# Patient Record
Sex: MALE | Race: WHITE | NOT HISPANIC OR LATINO | Employment: FULL TIME | URBAN - METROPOLITAN AREA
[De-identification: names, ages, dates, MRNs, and addresses within clinical notes are randomized per-mention and may not be internally consistent; named-entity substitution may affect disease eponyms.]

---

## 2017-04-07 ENCOUNTER — GENERIC CONVERSION - ENCOUNTER (OUTPATIENT)
Dept: OTHER | Facility: OTHER | Age: 42
End: 2017-04-07

## 2017-09-05 ENCOUNTER — ALLSCRIPTS OFFICE VISIT (OUTPATIENT)
Dept: OTHER | Facility: OTHER | Age: 42
End: 2017-09-05

## 2017-09-07 ENCOUNTER — HOSPITAL ENCOUNTER (INPATIENT)
Facility: HOSPITAL | Age: 42
LOS: 2 days | Discharge: HOME | DRG: 395 | End: 2017-09-09
Attending: EMERGENCY MEDICINE | Admitting: SURGERY
Payer: COMMERCIAL

## 2017-09-07 ENCOUNTER — ANESTHESIA (INPATIENT)
Dept: PERIOP | Facility: HOSPITAL | Age: 42
DRG: 395 | End: 2017-09-07
Payer: COMMERCIAL

## 2017-09-07 ENCOUNTER — ANESTHESIA EVENT (INPATIENT)
Dept: PERIOP | Facility: HOSPITAL | Age: 42
DRG: 395 | End: 2017-09-07
Payer: COMMERCIAL

## 2017-09-07 ENCOUNTER — ALLSCRIPTS OFFICE VISIT (OUTPATIENT)
Dept: OTHER | Facility: OTHER | Age: 42
End: 2017-09-07

## 2017-09-07 DIAGNOSIS — I10 ESSENTIAL HYPERTENSION: Chronic | ICD-10-CM

## 2017-09-07 DIAGNOSIS — E11.9 TYPE 2 DIABETES MELLITUS WITHOUT COMPLICATION, WITHOUT LONG-TERM CURRENT USE OF INSULIN (HCC): Chronic | ICD-10-CM

## 2017-09-07 DIAGNOSIS — A41.9 SEPSIS (HCC): ICD-10-CM

## 2017-09-07 DIAGNOSIS — K61.1 PERIRECTAL ABSCESS: Primary | ICD-10-CM

## 2017-09-07 LAB
ABO GROUP BLD: NORMAL
ALBUMIN SERPL BCP-MCNC: 3.2 G/DL (ref 3.5–5)
ALP SERPL-CCNC: 134 U/L (ref 46–116)
ALT SERPL W P-5'-P-CCNC: 51 U/L (ref 12–78)
ANION GAP SERPL CALCULATED.3IONS-SCNC: 7 MMOL/L (ref 4–13)
APTT PPP: 31 SECONDS (ref 23–35)
AST SERPL W P-5'-P-CCNC: 25 U/L (ref 5–45)
ATRIAL RATE: 83 BPM
BASOPHILS # BLD AUTO: 0.03 THOUSANDS/ΜL (ref 0–0.1)
BASOPHILS NFR BLD AUTO: 0 % (ref 0–1)
BILIRUB SERPL-MCNC: 0.7 MG/DL (ref 0.2–1)
BLD GP AB SCN SERPL QL: NEGATIVE
BUN SERPL-MCNC: 14 MG/DL (ref 5–25)
CALCIUM SERPL-MCNC: 9 MG/DL (ref 8.3–10.1)
CHLORIDE SERPL-SCNC: 93 MMOL/L (ref 100–108)
CLARITY, POC: CLEAR
CO2 SERPL-SCNC: 33 MMOL/L (ref 21–32)
COLOR, POC: NORMAL
CREAT SERPL-MCNC: 1.18 MG/DL (ref 0.6–1.3)
EOSINOPHIL # BLD AUTO: 0.06 THOUSAND/ΜL (ref 0–0.61)
EOSINOPHIL NFR BLD AUTO: 1 % (ref 0–6)
ERYTHROCYTE [DISTWIDTH] IN BLOOD BY AUTOMATED COUNT: 12 % (ref 11.6–15.1)
EXT BILIRUBIN, UA: NEGATIVE
EXT BLOOD URINE: NEGATIVE
EXT GLUCOSE, UA: 2000
EXT KETONES: NEGATIVE
EXT NITRITE, UA: NEGATIVE
EXT PH, UA: 8.5
EXT PROTEIN, UA: NORMAL
EXT SPECIFIC GRAVITY, UA: 1
EXT UROBILINOGEN: 1
GFR SERPL CREATININE-BSD FRML MDRD: 75 ML/MIN/1.73SQ M
GLUCOSE SERPL-MCNC: 269 MG/DL (ref 65–140)
GLUCOSE SERPL-MCNC: 359 MG/DL (ref 65–140)
HCT VFR BLD AUTO: 39.4 % (ref 36.5–49.3)
HGB BLD-MCNC: 13.4 G/DL (ref 12–17)
INR PPP: 1.09 (ref 0.86–1.16)
LACTATE SERPL-SCNC: 2.6 MMOL/L (ref 0.5–2)
LYMPHOCYTES # BLD AUTO: 0.98 THOUSANDS/ΜL (ref 0.6–4.47)
LYMPHOCYTES NFR BLD AUTO: 8 % (ref 14–44)
MCH RBC QN AUTO: 28 PG (ref 26.8–34.3)
MCHC RBC AUTO-ENTMCNC: 34 G/DL (ref 31.4–37.4)
MCV RBC AUTO: 82 FL (ref 82–98)
MONOCYTES # BLD AUTO: 0.9 THOUSAND/ΜL (ref 0.17–1.22)
MONOCYTES NFR BLD AUTO: 7 % (ref 4–12)
NEUTROPHILS # BLD AUTO: 10.08 THOUSANDS/ΜL (ref 1.85–7.62)
NEUTS SEG NFR BLD AUTO: 83 % (ref 43–75)
NRBC BLD AUTO-RTO: 0 /100 WBCS
P AXIS: 62 DEGREES
PLATELET # BLD AUTO: 273 THOUSANDS/UL (ref 149–390)
PMV BLD AUTO: 10.3 FL (ref 8.9–12.7)
POTASSIUM SERPL-SCNC: 3.2 MMOL/L (ref 3.5–5.3)
PR INTERVAL: 174 MS
PROT SERPL-MCNC: 7.9 G/DL (ref 6.4–8.2)
PROTHROMBIN TIME: 14.4 SECONDS (ref 12.1–14.4)
QRS AXIS: 31 DEGREES
QRSD INTERVAL: 136 MS
QT INTERVAL: 402 MS
QTC INTERVAL: 472 MS
RBC # BLD AUTO: 4.78 MILLION/UL (ref 3.88–5.62)
RH BLD: POSITIVE
SODIUM SERPL-SCNC: 133 MMOL/L (ref 136–145)
SPECIMEN EXPIRATION DATE: NORMAL
T WAVE AXIS: 28 DEGREES
VENTRICULAR RATE: 83 BPM
WBC # BLD AUTO: 12.12 THOUSAND/UL (ref 4.31–10.16)
WBC # BLD EST: NEGATIVE 10*3/UL

## 2017-09-07 PROCEDURE — 93005 ELECTROCARDIOGRAM TRACING: CPT | Performed by: EMERGENCY MEDICINE

## 2017-09-07 PROCEDURE — 99285 EMERGENCY DEPT VISIT HI MDM: CPT

## 2017-09-07 PROCEDURE — 80053 COMPREHEN METABOLIC PANEL: CPT | Performed by: EMERGENCY MEDICINE

## 2017-09-07 PROCEDURE — 96361 HYDRATE IV INFUSION ADD-ON: CPT

## 2017-09-07 PROCEDURE — 87205 SMEAR GRAM STAIN: CPT | Performed by: SURGERY

## 2017-09-07 PROCEDURE — 87147 CULTURE TYPE IMMUNOLOGIC: CPT | Performed by: SURGERY

## 2017-09-07 PROCEDURE — 87075 CULTR BACTERIA EXCEPT BLOOD: CPT | Performed by: SURGERY

## 2017-09-07 PROCEDURE — 83605 ASSAY OF LACTIC ACID: CPT | Performed by: EMERGENCY MEDICINE

## 2017-09-07 PROCEDURE — 86900 BLOOD TYPING SEROLOGIC ABO: CPT | Performed by: EMERGENCY MEDICINE

## 2017-09-07 PROCEDURE — 85730 THROMBOPLASTIN TIME PARTIAL: CPT | Performed by: EMERGENCY MEDICINE

## 2017-09-07 PROCEDURE — 82948 REAGENT STRIP/BLOOD GLUCOSE: CPT

## 2017-09-07 PROCEDURE — 0Y900ZZ DRAINAGE OF RIGHT BUTTOCK, OPEN APPROACH: ICD-10-PCS | Performed by: SURGERY

## 2017-09-07 PROCEDURE — 81002 URINALYSIS NONAUTO W/O SCOPE: CPT | Performed by: EMERGENCY MEDICINE

## 2017-09-07 PROCEDURE — 36415 COLL VENOUS BLD VENIPUNCTURE: CPT | Performed by: EMERGENCY MEDICINE

## 2017-09-07 PROCEDURE — 96375 TX/PRO/DX INJ NEW DRUG ADDON: CPT

## 2017-09-07 PROCEDURE — 85025 COMPLETE CBC W/AUTO DIFF WBC: CPT | Performed by: EMERGENCY MEDICINE

## 2017-09-07 PROCEDURE — 86850 RBC ANTIBODY SCREEN: CPT | Performed by: EMERGENCY MEDICINE

## 2017-09-07 PROCEDURE — 96374 THER/PROPH/DIAG INJ IV PUSH: CPT

## 2017-09-07 PROCEDURE — 86901 BLOOD TYPING SEROLOGIC RH(D): CPT | Performed by: EMERGENCY MEDICINE

## 2017-09-07 PROCEDURE — 87070 CULTURE OTHR SPECIMN AEROBIC: CPT | Performed by: SURGERY

## 2017-09-07 PROCEDURE — 85610 PROTHROMBIN TIME: CPT | Performed by: EMERGENCY MEDICINE

## 2017-09-07 RX ORDER — ONDANSETRON 2 MG/ML
4 INJECTION INTRAMUSCULAR; INTRAVENOUS ONCE AS NEEDED
Status: DISCONTINUED | OUTPATIENT
Start: 2017-09-07 | End: 2017-09-07 | Stop reason: HOSPADM

## 2017-09-07 RX ORDER — CEPHALEXIN 500 MG/1
CAPSULE ORAL
COMMUNITY
Start: 2017-09-05 | End: 2018-08-23 | Stop reason: CLARIF

## 2017-09-07 RX ORDER — SODIUM CHLORIDE 9 MG/ML
125 INJECTION, SOLUTION INTRAVENOUS CONTINUOUS
Status: DISCONTINUED | OUTPATIENT
Start: 2017-09-07 | End: 2017-09-07

## 2017-09-07 RX ORDER — DIPHENOXYLATE HYDROCHLORIDE AND ATROPINE SULFATE 2.5; .025 MG/1; MG/1
1 TABLET ORAL DAILY
COMMUNITY

## 2017-09-07 RX ORDER — MORPHINE SULFATE 2 MG/ML
2 INJECTION, SOLUTION INTRAMUSCULAR; INTRAVENOUS
Status: DISCONTINUED | OUTPATIENT
Start: 2017-09-07 | End: 2017-09-09 | Stop reason: HOSPADM

## 2017-09-07 RX ORDER — ATENOLOL AND CHLORTHALIDONE TABLET 50; 25 MG/1; MG/1
TABLET ORAL
COMMUNITY
Start: 2017-08-09 | End: 2018-08-23 | Stop reason: CLARIF

## 2017-09-07 RX ORDER — KETOROLAC TROMETHAMINE 30 MG/ML
15 INJECTION, SOLUTION INTRAMUSCULAR; INTRAVENOUS ONCE
Status: COMPLETED | OUTPATIENT
Start: 2017-09-07 | End: 2017-09-07

## 2017-09-07 RX ORDER — FENTANYL CITRATE 50 UG/ML
INJECTION, SOLUTION INTRAMUSCULAR; INTRAVENOUS AS NEEDED
Status: DISCONTINUED | OUTPATIENT
Start: 2017-09-07 | End: 2017-09-07 | Stop reason: SURG

## 2017-09-07 RX ORDER — DEXMEDETOMIDINE HYDROCHLORIDE 100 UG/ML
INJECTION, SOLUTION INTRAVENOUS AS NEEDED
Status: DISCONTINUED | OUTPATIENT
Start: 2017-09-07 | End: 2017-09-07 | Stop reason: SURG

## 2017-09-07 RX ORDER — ATORVASTATIN CALCIUM 20 MG/1
TABLET, FILM COATED ORAL
COMMUNITY
Start: 2017-08-07 | End: 2018-08-23 | Stop reason: CLARIF

## 2017-09-07 RX ORDER — OXYCODONE HYDROCHLORIDE AND ACETAMINOPHEN 5; 325 MG/1; MG/1
1 TABLET ORAL EVERY 4 HOURS PRN
Status: DISCONTINUED | OUTPATIENT
Start: 2017-09-07 | End: 2017-09-09 | Stop reason: HOSPADM

## 2017-09-07 RX ORDER — CEFAZOLIN SODIUM 1 G/3ML
INJECTION, POWDER, FOR SOLUTION INTRAMUSCULAR; INTRAVENOUS AS NEEDED
Status: DISCONTINUED | OUTPATIENT
Start: 2017-09-07 | End: 2017-09-07 | Stop reason: SURG

## 2017-09-07 RX ORDER — PROPOFOL 10 MG/ML
INJECTION, EMULSION INTRAVENOUS AS NEEDED
Status: DISCONTINUED | OUTPATIENT
Start: 2017-09-07 | End: 2017-09-07 | Stop reason: SURG

## 2017-09-07 RX ORDER — MAGNESIUM HYDROXIDE 1200 MG/15ML
LIQUID ORAL AS NEEDED
Status: DISCONTINUED | OUTPATIENT
Start: 2017-09-07 | End: 2017-09-07 | Stop reason: HOSPADM

## 2017-09-07 RX ORDER — SODIUM CHLORIDE, SODIUM LACTATE, POTASSIUM CHLORIDE, CALCIUM CHLORIDE 600; 310; 30; 20 MG/100ML; MG/100ML; MG/100ML; MG/100ML
125 INJECTION, SOLUTION INTRAVENOUS CONTINUOUS
Status: DISCONTINUED | OUTPATIENT
Start: 2017-09-07 | End: 2017-09-07

## 2017-09-07 RX ORDER — ACETAMINOPHEN 325 MG/1
650 TABLET ORAL EVERY 6 HOURS PRN
Status: DISCONTINUED | OUTPATIENT
Start: 2017-09-07 | End: 2017-09-09 | Stop reason: HOSPADM

## 2017-09-07 RX ORDER — GLIMEPIRIDE 4 MG/1
TABLET ORAL
COMMUNITY
Start: 2017-05-18 | End: 2018-08-23 | Stop reason: CLARIF

## 2017-09-07 RX ADMIN — FENTANYL CITRATE 50 MCG: 50 INJECTION, SOLUTION INTRAMUSCULAR; INTRAVENOUS at 18:02

## 2017-09-07 RX ADMIN — ACETAMINOPHEN 650 MG: 325 TABLET ORAL at 13:58

## 2017-09-07 RX ADMIN — KETOROLAC TROMETHAMINE 15 MG: 30 INJECTION, SOLUTION INTRAMUSCULAR at 12:39

## 2017-09-07 RX ADMIN — METRONIDAZOLE 500 MG: 500 INJECTION, SOLUTION INTRAVENOUS at 14:33

## 2017-09-07 RX ADMIN — CEFAZOLIN SODIUM 2000 MG: 2 SOLUTION INTRAVENOUS at 14:00

## 2017-09-07 RX ADMIN — CEFAZOLIN SODIUM 2000 MG: 2 SOLUTION INTRAVENOUS at 21:15

## 2017-09-07 RX ADMIN — CEFAZOLIN 2000 MG: 1 INJECTION, POWDER, FOR SOLUTION INTRAVENOUS at 18:02

## 2017-09-07 RX ADMIN — HYDROMORPHONE HYDROCHLORIDE 0.5 MG: 1 INJECTION, SOLUTION INTRAMUSCULAR; INTRAVENOUS; SUBCUTANEOUS at 12:39

## 2017-09-07 RX ADMIN — SODIUM CHLORIDE, SODIUM LACTATE, POTASSIUM CHLORIDE, AND CALCIUM CHLORIDE 125 ML/HR: .6; .31; .03; .02 INJECTION, SOLUTION INTRAVENOUS at 14:28

## 2017-09-07 RX ADMIN — DEXMEDETOMIDINE 20 MCG: 100 INJECTION, SOLUTION, CONCENTRATE INTRAVENOUS at 18:05

## 2017-09-07 RX ADMIN — LIDOCAINE HYDROCHLORIDE 100 MG: 20 INJECTION, SOLUTION INTRAVENOUS at 17:56

## 2017-09-07 RX ADMIN — OXYCODONE HYDROCHLORIDE AND ACETAMINOPHEN 1 TABLET: 5; 325 TABLET ORAL at 22:46

## 2017-09-07 RX ADMIN — HYDROMORPHONE HYDROCHLORIDE 0.5 MG: 1 INJECTION, SOLUTION INTRAMUSCULAR; INTRAVENOUS; SUBCUTANEOUS at 18:15

## 2017-09-07 RX ADMIN — METRONIDAZOLE 500 MG: 500 INJECTION, SOLUTION INTRAVENOUS at 22:46

## 2017-09-07 RX ADMIN — FENTANYL CITRATE 50 MCG: 50 INJECTION, SOLUTION INTRAMUSCULAR; INTRAVENOUS at 18:05

## 2017-09-07 RX ADMIN — INSULIN LISPRO 6 UNITS: 100 INJECTION, SOLUTION INTRAVENOUS; SUBCUTANEOUS at 22:46

## 2017-09-07 RX ADMIN — SODIUM CHLORIDE 1000 ML: 0.9 INJECTION, SOLUTION INTRAVENOUS at 12:35

## 2017-09-07 RX ADMIN — PROPOFOL 200 MG: 10 INJECTION, EMULSION INTRAVENOUS at 17:56

## 2017-09-08 LAB
ANION GAP SERPL CALCULATED.3IONS-SCNC: 5 MMOL/L (ref 4–13)
BUN SERPL-MCNC: 10 MG/DL (ref 5–25)
CALCIUM SERPL-MCNC: 8.8 MG/DL (ref 8.3–10.1)
CHLORIDE SERPL-SCNC: 97 MMOL/L (ref 100–108)
CO2 SERPL-SCNC: 32 MMOL/L (ref 21–32)
CREAT SERPL-MCNC: 0.96 MG/DL (ref 0.6–1.3)
ERYTHROCYTE [DISTWIDTH] IN BLOOD BY AUTOMATED COUNT: 11.9 % (ref 11.6–15.1)
EST. AVERAGE GLUCOSE BLD GHB EST-MCNC: 194 MG/DL
GFR SERPL CREATININE-BSD FRML MDRD: 96 ML/MIN/1.73SQ M
GLUCOSE SERPL-MCNC: 164 MG/DL (ref 65–140)
GLUCOSE SERPL-MCNC: 171 MG/DL (ref 65–140)
GLUCOSE SERPL-MCNC: 218 MG/DL (ref 65–140)
GLUCOSE SERPL-MCNC: 225 MG/DL (ref 65–140)
GLUCOSE SERPL-MCNC: 235 MG/DL (ref 65–140)
GLUCOSE SERPL-MCNC: 279 MG/DL (ref 65–140)
HBA1C MFR BLD: 8.4 % (ref 4.2–6.3)
HCT VFR BLD AUTO: 36.7 % (ref 36.5–49.3)
HGB BLD-MCNC: 12.4 G/DL (ref 12–17)
MAGNESIUM SERPL-MCNC: 1.8 MG/DL (ref 1.6–2.6)
MCH RBC QN AUTO: 28.3 PG (ref 26.8–34.3)
MCHC RBC AUTO-ENTMCNC: 33.8 G/DL (ref 31.4–37.4)
MCV RBC AUTO: 84 FL (ref 82–98)
PLATELET # BLD AUTO: 257 THOUSANDS/UL (ref 149–390)
PMV BLD AUTO: 10.3 FL (ref 8.9–12.7)
POTASSIUM SERPL-SCNC: 2.9 MMOL/L (ref 3.5–5.3)
RBC # BLD AUTO: 4.38 MILLION/UL (ref 3.88–5.62)
SODIUM SERPL-SCNC: 134 MMOL/L (ref 136–145)
WBC # BLD AUTO: 10.52 THOUSAND/UL (ref 4.31–10.16)

## 2017-09-08 PROCEDURE — 83036 HEMOGLOBIN GLYCOSYLATED A1C: CPT | Performed by: PHYSICIAN ASSISTANT

## 2017-09-08 PROCEDURE — 82948 REAGENT STRIP/BLOOD GLUCOSE: CPT

## 2017-09-08 PROCEDURE — 85027 COMPLETE CBC AUTOMATED: CPT | Performed by: PHYSICIAN ASSISTANT

## 2017-09-08 PROCEDURE — 83735 ASSAY OF MAGNESIUM: CPT | Performed by: GENERAL PRACTICE

## 2017-09-08 PROCEDURE — 80048 BASIC METABOLIC PNL TOTAL CA: CPT | Performed by: GENERAL PRACTICE

## 2017-09-08 RX ORDER — METRONIDAZOLE 500 MG/1
500 TABLET ORAL EVERY 8 HOURS SCHEDULED
Status: DISCONTINUED | OUTPATIENT
Start: 2017-09-08 | End: 2017-09-09 | Stop reason: HOSPADM

## 2017-09-08 RX ADMIN — METRONIDAZOLE 500 MG: 500 TABLET ORAL at 21:21

## 2017-09-08 RX ADMIN — CEFAZOLIN SODIUM 2000 MG: 2 SOLUTION INTRAVENOUS at 21:22

## 2017-09-08 RX ADMIN — OXYCODONE HYDROCHLORIDE AND ACETAMINOPHEN 1 TABLET: 5; 325 TABLET ORAL at 16:49

## 2017-09-08 RX ADMIN — CEFAZOLIN SODIUM 2000 MG: 2 SOLUTION INTRAVENOUS at 12:31

## 2017-09-08 RX ADMIN — OXYCODONE HYDROCHLORIDE AND ACETAMINOPHEN 1 TABLET: 5; 325 TABLET ORAL at 21:21

## 2017-09-08 RX ADMIN — ATENOLOL: 50 TABLET ORAL at 16:44

## 2017-09-08 RX ADMIN — OXYCODONE HYDROCHLORIDE AND ACETAMINOPHEN 1 TABLET: 5; 325 TABLET ORAL at 12:41

## 2017-09-08 RX ADMIN — INSULIN LISPRO 2 UNITS: 100 INJECTION, SOLUTION INTRAVENOUS; SUBCUTANEOUS at 08:38

## 2017-09-08 RX ADMIN — METRONIDAZOLE 500 MG: 500 INJECTION, SOLUTION INTRAVENOUS at 13:55

## 2017-09-08 RX ADMIN — OXYCODONE HYDROCHLORIDE AND ACETAMINOPHEN 1 TABLET: 5; 325 TABLET ORAL at 06:36

## 2017-09-08 RX ADMIN — INSULIN LISPRO 4 UNITS: 100 INJECTION, SOLUTION INTRAVENOUS; SUBCUTANEOUS at 16:50

## 2017-09-08 RX ADMIN — METRONIDAZOLE 500 MG: 500 INJECTION, SOLUTION INTRAVENOUS at 05:54

## 2017-09-08 RX ADMIN — OXYCODONE HYDROCHLORIDE AND ACETAMINOPHEN 1 TABLET: 5; 325 TABLET ORAL at 03:07

## 2017-09-08 RX ADMIN — ENOXAPARIN SODIUM 40 MG: 40 INJECTION SUBCUTANEOUS at 08:39

## 2017-09-08 RX ADMIN — INSULIN LISPRO 4 UNITS: 100 INJECTION, SOLUTION INTRAVENOUS; SUBCUTANEOUS at 12:18

## 2017-09-08 RX ADMIN — INSULIN LISPRO 4 UNITS: 100 INJECTION, SOLUTION INTRAVENOUS; SUBCUTANEOUS at 21:30

## 2017-09-08 RX ADMIN — CEFAZOLIN SODIUM 2000 MG: 2 SOLUTION INTRAVENOUS at 04:48

## 2017-09-09 VITALS
HEIGHT: 76 IN | RESPIRATION RATE: 18 BRPM | DIASTOLIC BLOOD PRESSURE: 76 MMHG | TEMPERATURE: 97.9 F | BODY MASS INDEX: 37.99 KG/M2 | SYSTOLIC BLOOD PRESSURE: 136 MMHG | WEIGHT: 311.95 LBS | HEART RATE: 71 BPM | OXYGEN SATURATION: 98 %

## 2017-09-09 LAB
BACTERIA SPEC ANAEROBE CULT: NORMAL
BACTERIA WND AEROBE CULT: NORMAL
GLUCOSE SERPL-MCNC: 183 MG/DL (ref 65–140)
GLUCOSE SERPL-MCNC: 240 MG/DL (ref 65–140)
GRAM STN SPEC: NORMAL

## 2017-09-09 PROCEDURE — 82948 REAGENT STRIP/BLOOD GLUCOSE: CPT

## 2017-09-09 RX ORDER — OXYCODONE HYDROCHLORIDE AND ACETAMINOPHEN 5; 325 MG/1; MG/1
1 TABLET ORAL EVERY 4 HOURS PRN
Qty: 30 TABLET | Refills: 0 | Status: SHIPPED | OUTPATIENT
Start: 2017-09-09 | End: 2017-09-19

## 2017-09-09 RX ADMIN — INSULIN LISPRO 4 UNITS: 100 INJECTION, SOLUTION INTRAVENOUS; SUBCUTANEOUS at 12:17

## 2017-09-09 RX ADMIN — OXYCODONE HYDROCHLORIDE AND ACETAMINOPHEN 1 TABLET: 5; 325 TABLET ORAL at 09:57

## 2017-09-09 RX ADMIN — METRONIDAZOLE 500 MG: 500 TABLET ORAL at 05:31

## 2017-09-09 RX ADMIN — CEFAZOLIN SODIUM 2000 MG: 2 SOLUTION INTRAVENOUS at 05:31

## 2017-09-09 RX ADMIN — ATENOLOL: 50 TABLET ORAL at 11:47

## 2017-09-09 RX ADMIN — INSULIN LISPRO 2 UNITS: 100 INJECTION, SOLUTION INTRAVENOUS; SUBCUTANEOUS at 09:58

## 2017-09-09 RX ADMIN — OXYCODONE HYDROCHLORIDE AND ACETAMINOPHEN 1 TABLET: 5; 325 TABLET ORAL at 05:31

## 2017-09-11 ENCOUNTER — GENERIC CONVERSION - ENCOUNTER (OUTPATIENT)
Dept: OTHER | Facility: OTHER | Age: 42
End: 2017-09-11

## 2017-09-14 ENCOUNTER — GENERIC CONVERSION - ENCOUNTER (OUTPATIENT)
Dept: OTHER | Facility: OTHER | Age: 42
End: 2017-09-14

## 2017-09-19 ENCOUNTER — ALLSCRIPTS OFFICE VISIT (OUTPATIENT)
Dept: OTHER | Facility: OTHER | Age: 42
End: 2017-09-19

## 2017-09-28 ENCOUNTER — GENERIC CONVERSION - ENCOUNTER (OUTPATIENT)
Dept: OTHER | Facility: OTHER | Age: 42
End: 2017-09-28

## 2017-10-13 ENCOUNTER — GENERIC CONVERSION - ENCOUNTER (OUTPATIENT)
Dept: OTHER | Facility: OTHER | Age: 42
End: 2017-10-13

## 2017-10-25 ENCOUNTER — GENERIC CONVERSION - ENCOUNTER (OUTPATIENT)
Dept: OTHER | Facility: OTHER | Age: 42
End: 2017-10-25

## 2017-11-02 ENCOUNTER — ALLSCRIPTS OFFICE VISIT (OUTPATIENT)
Dept: OTHER | Facility: OTHER | Age: 42
End: 2017-11-02

## 2017-11-15 ENCOUNTER — GENERIC CONVERSION - ENCOUNTER (OUTPATIENT)
Dept: OTHER | Facility: OTHER | Age: 42
End: 2017-11-15

## 2017-12-19 ENCOUNTER — GENERIC CONVERSION - ENCOUNTER (OUTPATIENT)
Dept: OTHER | Facility: OTHER | Age: 42
End: 2017-12-19

## 2018-01-10 NOTE — PROGRESS NOTES
History of Present Illness  Care Coordination Encounter Information:   Type of Encounter: Telephonic    Spoke to Patient  Care Coordination  Nurse [address] [place] [address] [address]:   The reason for call is to discuss outreach for follow up/needed services and coordination of meeting care plan treatment goals  Patient stated that he has been doing well  Per last conversation, he was able to get appointment for urologist (Dr Thor Topete) on 12/19/17 for his cyst on right testicle  Right now patient denied pain or discomfort  He stated that his cyst is still on his right testicle but it comes and goes and it does not bother him  Reviewed over patient's upcoming appointment tomorrow 11/16/17 with PCP  He stated that he thought it was next week so he was thankful for the phone call  He stated that he will be attending appointment and has no issues with transportation  He is tolerating his medications as ordered and manages them on his own  PCP office updated on patient  Continues to have contact information  Will continue to f/u  Active Problems    1  Abnormal CT scan, colon (793 4) (R93 3)   2  Abnormal transaminases (790 4) (R74 8)   3  Ankle pain, right (719 47) (M25 571)   4  Ankylosis of ankle and foot joint (718 57)   5  Changing skin lesion (709 9) (L98 9)   6  Diabetes mellitus type 2, uncontrolled (250 02) (E11 65)   7  Hyperlipidemia (272 4) (E78 5)   8  Hypertension (401 9) (I10)   9  Morbid obesity (278 01) (E66 01)   10  Postoperative state (V45 89) (Z98 890)   11  Rectal abscess (566) (K61 1)   12  Rectal bleeding (569 3) (K62 5)   13  Right bundle-branch block (426 4) (I45 10)   14  Screening for skin condition (V82 0) (Z13 89)   15  Scrotal cyst (706 2) (L72 9)   16  Seborrheic keratosis (702 19) (L82 1)   17  Sleep apnea (780 57) (G47 30)    Past Medical History    1  History of Benign essential hypertension (401 1) (I10)   2  History of diabetes mellitus (V12 29) (Z86 39)   3   History of hypercholesterolemia (V12 29) (Z86 39)   4  History of Morbid obesity due to excess calories (278 01) (E66 01)   5  History of Rectal bleeding (569 3) (K62 5)    Surgical History    1  History of Excision Lesion Of Meniscus Or Capsule Knee    Family History  Mother    1  Family history of Diabetes  Father    2  Family history of Hypertension    Social History    · Employed in professional specialty position   · Former smoker (Z07 96) (P47 289)   ·    · Minimum alcohol consumption   · No drug use    Current Meds    1  Glimepiride 4 MG Oral Tablet; Take 1 tablet daily; Therapy: 93XLK0899 to (Last Rx:14Nov2017)  Requested for: 33PYX9853 Ordered    2  Atorvastatin Calcium 20 MG Oral Tablet; Take 1 tablet daily; Therapy: 17SQS5300 to (Last Rx:64Fhf0260)  Requested for: 86Bfi7055 Ordered    3  Atenolol-Chlorthalidone 50-25 MG Oral Tablet; Take 1 tablet daily; Therapy: 74CCJ5476 to (Last OX:73TNF2180)  Requested for: 72SIS4502 Ordered    4  Aspir-Low 81 MG Oral Tablet Delayed Release Recorded   5  Multiple Vitamins Oral Tablet Recorded    Allergies    1  Claritin   2  metformin    Health Management   COLONOSCOPY; every 5 years; Last 61Hyc3206; Next Due: 42Lfb3244; Active    End of Encounter Meds    1  Glimepiride 4 MG Oral Tablet; Take 1 tablet daily; Therapy: 74LMT7802 to (Last Rx:14Nov2017)  Requested for: 37KME9300 Ordered    2  Atorvastatin Calcium 20 MG Oral Tablet; Take 1 tablet daily; Therapy: 11POQ2747 to (Last Rx:64Nhc0388)  Requested for: 44Lqk5056 Ordered    3  Atenolol-Chlorthalidone 50-25 MG Oral Tablet; Take 1 tablet daily; Therapy: 29YJJ8817 to (Last YD:35SYP3406)  Requested for: 89HLF7213 Ordered    4  Aspir-Low 81 MG Oral Tablet Delayed Release Recorded   5  Multiple Vitamins Oral Tablet Recorded    Future Appointments    Date/Time Provider Specialty Site   12/19/2017 09:15 AM Lorraine Carrasco MD Urology [facility]   11/16/2017 01:15 PM KANDICE Jeffery   Internal Medicine  [address]     Patient Care Team    Care Team Member Role Specialty Office Number   Delia MOON  Internal Medicine [phone]   [facility]  Orthopedic Surgery [phone]   Zohra MOON  Dermatology [phone]   Maite MOON    Gastroenterology Adult [phone], 601 [facility]  Gastroenterology Adult [phone]     Signatures   Electronically signed by : Jacob Schmidt RN; Nov 15 2017  1:34PM EST                       (Author)

## 2018-01-11 NOTE — PROGRESS NOTES
History of Present Illness  Care Coordination Encounter Information:   Type of Encounter: Telephonic    Spoke to Patient  Care Coordination SL Nurse Lindsey Learn:   The reason for call is to discuss outreach for follow up/needed services and coordination of meeting care plan treatment goals  Oj Hodgson stated that he is doing well  Blood sugars have been in the 130s  Discussed with patient the importance to obtaining hemoglobin A1c  He has not had a hemoglobin A1c since last year  He stated that he will be getting in contact with PCP office after he looks at his calendar for yearly visit and labs  He stated that he got referred to urologist (Dr Venu Young) for a cyst on right testicle  He stated that he was not able to get a hold of anyone to make an appointment  I called [facility] and they stated that they will call patient to schedule an appointment  Updated Oj Hodgson and also provided him with the urologist phone number just in case  Will continue to f/u  Active Problems    1  Abnormal CT scan, colon (793 4) (R93 3)   2  Abnormal transaminases (790 4) (R74 8)   3  Ankle pain, right (719 47) (M25 571)   4  Ankylosis of ankle and foot joint (718 57)   5  Changing skin lesion (709 9) (L98 9)   6  Diabetes mellitus type 2, uncontrolled (250 02) (E11 65)   7  Hyperlipidemia (272 4) (E78 5)   8  Hypertension (401 9) (I10)   9  Morbid obesity (278 01) (E66 01)   10  Postoperative state (V45 89) (Z98 890)   11  Rectal abscess (566) (K61 1)   12  Rectal bleeding (569 3) (K62 5)   13  Right bundle-branch block (426 4) (I45 10)   14  Screening for skin condition (V82 0) (Z13 89)   15  Scrotal cyst (706 2) (L72 9)   16  Seborrheic keratosis (702 19) (L82 1)   17  Sleep apnea (780 57) (G47 30)    Past Medical History    1  History of Benign essential hypertension (401 1) (I10)   2  History of diabetes mellitus (V12 29) (Z86 39)   3  History of hypercholesterolemia (V12 29) (Z86 39)   4   History of Morbid obesity due to excess calories (278 01) (E66 01)   5  History of Rectal bleeding (569 3) (K62 5)    Surgical History    1  History of Excision Lesion Of Meniscus Or Capsule Knee    Family History  Mother    1  Family history of Diabetes  Father    2  Family history of Hypertension    Social History    · Employed in professional specialty position   · Former smoker (T70 40) (E7 792)   ·    · Minimum alcohol consumption   · No drug use    Current Meds    1  Glimepiride 4 MG Oral Tablet; Take 1 tablet daily; Therapy: 53REC2967 to (Last Rx:37Xra3273)  Requested for: 68PME1940 Ordered    2  Atorvastatin Calcium 20 MG Oral Tablet; Take 1 tablet daily; Therapy: 71OFM6474 to (Last Rx:89Xez6263)  Requested for: 39Nuk6574 Ordered    3  Atenolol-Chlorthalidone 50-25 MG Oral Tablet; Take 1 tablet daily; Therapy: 08PKU8546 to (Last Rx:94Eda7501)  Requested for: 94Cvp8647 Ordered    4  Aspir-Low 81 MG Oral Tablet Delayed Release Recorded   5  Multiple Vitamins Oral Tablet Recorded    Allergies    1  Claritin   2  metformin    Health Management   COLONOSCOPY; every 5 years; Last 50Ukf8904; Next Due: 20Cgk5618; Active    End of Encounter Meds    1  Glimepiride 4 MG Oral Tablet; Take 1 tablet daily; Therapy: 31YLL0630 to (Last Rx:09Lsx4843)  Requested for: 74IGB7172 Ordered    2  Atorvastatin Calcium 20 MG Oral Tablet; Take 1 tablet daily; Therapy: 44FAG1534 to (Last Rx:66Yno7117)  Requested for: 94Xuc1905 Ordered    3  Atenolol-Chlorthalidone 50-25 MG Oral Tablet; Take 1 tablet daily; Therapy: 59YDX1768 to (Last Rx:96Deo4385)  Requested for: 28Hew5604 Ordered    4  Aspir-Low 81 MG Oral Tablet Delayed Release Recorded   5   Multiple Vitamins Oral Tablet Recorded    Future Appointments    Date/Time Provider Specialty Site   11/02/2017 09:45 AM Krystin Leonard MD General Surgery [facility]   12/19/2017 09:15 AM Steph Alvarez MD Urology [address] [facility]     Patient Care Team    Care Team Member Role Specialty Office Number   Brayan MOON  Internal Medicine [phone]   [facility]  Orthopedic Surgery [phone]   Luz MOON  Dermatology [phone]   Chelsey MOON    Gastroenterology Adult [phone], 601 [facility]  Gastroenterology Adult [phone]     Signatures   Electronically signed by : Gio Coombs RN; Oct 25 2017 10:47AM EST                       (Author)

## 2018-01-11 NOTE — PROGRESS NOTES
History of Present Illness  Care Coordination Encounter Information:   Type of Encounter: Telephonic    Spoke to Patient  Care Coordination SL Nurse [facility]:   The reason for call is to discuss outreach for follow up/needed services and coordination of meeting care plan treatment goals  Natacha Ashley stated that he is doing alright  Blood sugars have been stable and continues to follow diabetic diet  Tolerating his medications  Reviewed over signs and symptoms of hypo/hyperglycemia; verbalized understanding  Has no questions, concerns or needs services at this time  Would like another f/u phone call but requested another f/u with in a month  Continues to have contact information  Active Problems    1  Abnormal CT scan, colon (793 4) (R93 3)   2  Abnormal transaminases (790 4) (R74 8)   3  Ankle pain, right (719 47) (M25 571)   4  Ankylosis of ankle and foot joint (718 57)   5  Changing skin lesion (709 9) (L98 9)   6  Diabetes mellitus type 2, uncontrolled (250 02) (E11 65)   7  Hyperlipidemia (272 4) (E78 5)   8  Hypertension (401 9) (I10)   9  Morbid obesity (278 01) (E66 01)   10  Postoperative state (V45 89) (Z98 890)   11  Rectal abscess (566) (K61 1)   12  Rectal bleeding (569 3) (K62 5)   13  Right bundle-branch block (426 4) (I45 10)   14  Screening for skin condition (V82 0) (Z13 89)   15  Scrotal cyst (706 2) (L72 9)   16  Seborrheic keratosis (702 19) (L82 1)   17  Sleep apnea (780 57) (G47 30)    Past Medical History    1  History of Benign essential hypertension (401 1) (I10)   2  History of diabetes mellitus (V12 29) (Z86 39)   3  History of hypercholesterolemia (V12 29) (Z86 39)   4  History of Morbid obesity due to excess calories (278 01) (E66 01)   5  History of Rectal bleeding (569 3) (K62 5)    Surgical History    1  History of Excision Lesion Of Meniscus Or Capsule Knee    Family History  Mother    1  Family history of Diabetes  Father    2   Family history of Hypertension    Social History    · Employed in professional specialty position   · Former smoker (J64 82) (D14 849)   ·    · Minimum alcohol consumption   · No drug use    Current Meds    1  Glimepiride 4 MG Oral Tablet; Take 1 tablet daily; Therapy: 43TNO4031 to (Last Rx:21Ugl8220)  Requested for: 95HIX7866 Ordered    2  Atorvastatin Calcium 20 MG Oral Tablet; Take 1 tablet daily; Therapy: 86RHM8075 to (Last Rx:25Fvl2948)  Requested for: 58Cou0819 Ordered    3  Atenolol-Chlorthalidone 50-25 MG Oral Tablet; Take 1 tablet daily; Therapy: 33CAS7882 to (Last Rx:14Zsa8495)  Requested for: 28Rei3110 Ordered    4  Aspir-Low 81 MG Oral Tablet Delayed Release Recorded   5  Multiple Vitamins Oral Tablet Recorded    Allergies    1  Claritin   2  metformin    Health Management   COLONOSCOPY; every 5 years; Last 34Onk7826; Next Due: 83Inq7923; Active    End of Encounter Meds    1  Glimepiride 4 MG Oral Tablet; Take 1 tablet daily; Therapy: 53ZRU5473 to (Last Rx:04Kmn5284)  Requested for: 26MDC6139 Ordered    2  Atorvastatin Calcium 20 MG Oral Tablet; Take 1 tablet daily; Therapy: 11MDY4294 to (Last Rx:59Hce6520)  Requested for: 17Bht8068 Ordered    3  Atenolol-Chlorthalidone 50-25 MG Oral Tablet; Take 1 tablet daily; Therapy: 89XIB4466 to (Last Rx:85Xam3806)  Requested for: 20Rsm6025 Ordered    4  Aspir-Low 81 MG Oral Tablet Delayed Release Recorded   5  Multiple Vitamins Oral Tablet Recorded    Future Appointments    Date/Time Provider Specialty Site   10/19/2017 09:00 AM Krystin Leonard MD General Surgery [address] [facility]     Patient Care Team    Care Team Member Role Specialty Office Number   Hal MOON  Internal Medicine [phone]   [facility]  Orthopedic Surgery [phone]   Wanda MOON  Dermatology [phone]   Moira MOON    Gastroenterology Adult [phone], 601 [facility]  Gastroenterology Adult [phone]     Signatures   Electronically signed by : Bina Dobbs RN; Oct 13 2017  3:42PM EST                       (Author)

## 2018-01-13 VITALS
SYSTOLIC BLOOD PRESSURE: 124 MMHG | WEIGHT: 306 LBS | HEIGHT: 76 IN | BODY MASS INDEX: 37.26 KG/M2 | DIASTOLIC BLOOD PRESSURE: 86 MMHG | TEMPERATURE: 99 F

## 2018-01-13 VITALS
SYSTOLIC BLOOD PRESSURE: 142 MMHG | HEIGHT: 76 IN | TEMPERATURE: 99.5 F | DIASTOLIC BLOOD PRESSURE: 82 MMHG | WEIGHT: 306 LBS | OXYGEN SATURATION: 95 % | BODY MASS INDEX: 37.26 KG/M2 | HEART RATE: 86 BPM

## 2018-01-15 NOTE — MISCELLANEOUS
Message  Return to work or school:        Patient is in my care  Visit on 10/19 will determine return to work date  Zelalem Miguel        Signatures   Electronically signed by : Zelalem Miguel, ; Sep 20 2017  9:20AM EST                       (Author)

## 2018-01-16 NOTE — PROGRESS NOTES
History of Present Illness  Care Coordination Encounter Information:   Type of Encounter: Telephonic    Spoke to Patient  Care Coordination SL Nurse [facility]:   The reason for call is to discuss outreach for follow up/needed services and coordination of meeting care plan treatment goals  Per patient he stated that his blood sugars have been stable in the 130s  Saw his endocrinologist and discussed a good diabetic diet regime for him  Tolerating his medications as ordered  Educated on good medication management  Patient also denies experiencing signs and symptoms of hypo/hyperglycemia  Denied pain or discomfort  Has no questions or concerns at this time  Took down contact information and is in agreement to f/u phone calls in a few weeks  Active Problems    1  Abnormal CT scan, colon (793 4) (R93 3)   2  Abnormal transaminases (790 4) (R74 0)   3  Ankle pain, right (719 47) (M25 571)   4  Ankylosis of ankle and foot joint (718 57)   5  Changing skin lesion (709 9) (L98 9)   6  Diabetes mellitus type 2, uncontrolled (250 02) (E11 65)   7  Hyperlipidemia (272 4) (E78 5)   8  Hypertension (401 9) (I10)   9  Morbid obesity (278 01) (E66 01)   10  Postoperative state (V45 89) (Z98 890)   11  Rectal abscess (566) (K61 1)   12  Rectal bleeding (569 3) (K62 5)   13  Right bundle-branch block (426 4) (I45 10)   14  Screening for skin condition (V82 0) (Z13 89)   15  Scrotal cyst (706 2) (L72 9)   16  Seborrheic keratosis (702 19) (L82 1)   17  Sleep apnea (780 57) (G47 30)    Past Medical History    1  History of Benign essential hypertension (401 1) (I10)   2  History of diabetes mellitus (V12 29) (Z86 39)   3  History of hypercholesterolemia (V12 29) (Z86 39)   4  History of Morbid obesity due to excess calories (278 01) (E66 01)   5  History of Rectal bleeding (569 3) (K62 5)    Surgical History    1  History of Excision Lesion Of Meniscus Or Capsule Knee    Family History  Mother    1   Family history of Diabetes  Father    2  Family history of Hypertension    Social History    · Employed in professional specialty position   · Former smoker (F36 86) (L91 111)   ·    · Minimum alcohol consumption   · No drug use    Current Meds    1  Glimepiride 4 MG Oral Tablet; Take 1 tablet daily; Therapy: 00LOP6337 to (Last Rx:62Pbn8633)  Requested for: 29UUQ0948 Ordered    2  Atorvastatin Calcium 20 MG Oral Tablet; Take 1 tablet daily; Therapy: 30HLF2842 to (Last Rx:30Dsq1708)  Requested for: 51Tpr6823 Ordered    3  Atenolol-Chlorthalidone 50-25 MG Oral Tablet; Take 1 tablet daily; Therapy: 06KJT8571 to (Last Rx:06Zaa1238)  Requested for: 27Jio2291 Ordered    4  Aspir-Low 81 MG Oral Tablet Delayed Release Recorded   5  Multiple Vitamins Oral Tablet Recorded    Allergies    1  Claritin   2  metformin    Health Management   COLONOSCOPY; every 5 years; Last 84Huc3045; Next Due: 91Ggq4512; Active    End of Encounter Meds    1  Glimepiride 4 MG Oral Tablet; Take 1 tablet daily; Therapy: 54VNQ3792 to (Last Rx:91Tgi3706)  Requested for: 32YBX7262 Ordered    2  Atorvastatin Calcium 20 MG Oral Tablet; Take 1 tablet daily; Therapy: 22DBO2906 to (Last Rx:08Wmi0601)  Requested for: 25Tjq9266 Ordered    3  Atenolol-Chlorthalidone 50-25 MG Oral Tablet; Take 1 tablet daily; Therapy: 26TUD6064 to (Last Rx:27Shu0950)  Requested for: 48Nxl4620 Ordered    4  Aspir-Low 81 MG Oral Tablet Delayed Release Recorded   5  Multiple Vitamins Oral Tablet Recorded    Future Appointments    Date/Time Provider Specialty Site   10/19/2017 09:00 AM Dorys Boggs MD General Surgery [address] [facility]     Patient Care Team    Care Team Member Role Specialty Office Number   Zeinab MOON  Internal Medicine [phone]   [facility]  Orthopedic Surgery [phone]   Julissa MOON  Dermatology [phone]   Dolly MOON    Gastroenterology Adult [phone]   [facility]ces Gastroenterology Adult [phone]     Signatures   Electronically signed by : Lakisha Hampton RN; Sep 28 2017 11:33AM EST                       (Author)

## 2018-01-16 NOTE — MISCELLANEOUS
Message  Return to work or school:   Claudia Barrientos is under my professional care  He was seen in my office on 11/02/2017     He is able to perform activities of daily living without limitations  , He is able to participate in sports/gym without limitations  , He can perform work without limitations  , He can perform housework without limitations  Weight Bearing Status: Full Weight-Bearing  PATIETN WAS IN MY CARE FROM 09/07/2017 TO 10/20/2017, FULL WORK RELEASE  Guilherme Lo        Signatures   Electronically signed by : Elle Burrell, ; Nov 2 2017 10:05AM EST                       (Author)

## 2018-01-17 NOTE — MISCELLANEOUS
Assessment    1  Rectal abscess (566) (K61 1)    Discussion/Summary  Discussion Summary:   Perirectal abscess--status post incision and drainage--- patient is doing well, continue antibiotics, use narcotics judiciously  Drink plenty of water, eat plenty of fruits and vegetables to prevent constipation  Follow-up Dr Nancy Fried next week      Diabetes--hemoglobin A1c done in the hospital was 8 8, the patient is seen an endocrinologist with [facility] in about a week  I asked the patient to notify us if any medication changes implemented by the endocrinologist          Chief Complaint  Chief Complaint Free Text Note Form: hospital follow-up      History of Present Illness  TCM Communication Declan Bailey: [facility] were reviewed  He was hospitalized at [facility]  The date of admission: 9/7/17, date of discharge: 9/9/17  Diagnosis: perirectral abcess-s/p I&D  He was discharged to home  Medications reviewed and updated today  He scheduled a follow up appointment  Follow-up appointments with other specialists: Dr Nancy Fried 9/21/17  Communication performed and completed by   [facility]: patient is here for hospital follow-up  He was in [address] [place] [address] from September seventh to September ninth  He was treated for a perirectal abscess  He had it incised and drained by Dr Nancy Fried  He was treated with IV antibiotics and then sent home to continue and finish the cephalexin that he was prescribed by me when he came here the problem on September fifth  He is doing well, he feels much better since been drained  He is doing sitz baths, and changing the dressing every day  He is seeing Dr Nancy Fried next week  Review of Systems  Complete-Male:   Constitutional: No fever or chills, feels well, no tiredness, no recent weight gain or weight loss  ENT: no complaints of earache, no hearing loss, no nosebleeds, no nasal discharge, no sore throat, no hoarseness     Cardiovascular: No complaints of slow heart rate, no fast heart rate, no chest pain, no palpitations, no leg claudication, no lower extremity  Respiratory: No complaints of shortness of breath, no wheezing, no cough, no SOB on exertion, no orthopnea or PND  Gastrointestinal: No complaints of abdominal pain, no constipation, no nausea or vomiting, no diarrhea or bloody stools  Genitourinary: No complaints of dysuria, no incontinence, no hesitancy, no nocturia, no genital lesion, no testicular pain  Integumentary: skin wound, but as noted in HPI  ROS Reviewed:   ROS reviewed  Active Problems    1  Abnormal CT scan, colon (793 4) (R93 3)   2  Abnormal transaminases (790 4) (R74 0)   3  Ankle pain, right (719 47) (M25 571)   4  Ankylosis of ankle and foot joint (718 57)   5  Changing skin lesion (709 9) (L98 9)   6  Diabetes mellitus type 2, uncontrolled (250 02) (E11 65)   7  Hyperlipidemia (272 4) (E78 5)   8  Hypertension (401 9) (I10)   9  Morbid obesity (278 01) (E66 01)   10  Rectal abscess (566) (K61 1)   11  Rectal bleeding (569 3) (K62 5)   12  Right bundle-branch block (426 4) (I45 10)   13  Screening for skin condition (V82 0) (Z13 89)   14  Seborrheic keratosis (702 19) (L82 1)   15  Sleep apnea (780 57) (G47 30)    Past Medical History    1  History of Benign essential hypertension (401 1) (I10)   2  History of diabetes mellitus (V12 29) (Z86 39)   3  History of hypercholesterolemia (V12 29) (Z86 39)   4  History of Morbid obesity due to excess calories (278 01) (E66 01)   5  History of Rectal bleeding (569 3) (K62 5)    Surgical History    1  History of Excision Lesion Of Meniscus Or Capsule Knee  Surgical History Reviewed: The surgical history was reviewed and updated today  Family History  Mother    1  Family history of Diabetes  Father    2  Family history of Hypertension    Social History     · Employed in professional specialty position   ·    · Minimum alcohol consumption   · No drug use  Social History Reviewed:  The social history was reviewed and updated today  Former smoker (V15 82) (X20 395)             Current Meds   1  Aspir-Low 81 MG Oral Tablet Delayed Release Recorded   2  Atenolol-Chlorthalidone 50-25 MG Oral Tablet; Take 1 tablet daily; Therapy: 44HFP1579 to (Last Rx:59Rww7423)  Requested for: 37Nwl8853 Ordered   3  Atorvastatin Calcium 20 MG Oral Tablet; Take 1 tablet daily; Therapy: 88OHJ9707 to (Last Rx:27Sva6972)  Requested for: 38Azl7869 Ordered   4  Glimepiride 4 MG Oral Tablet; Take 1 tablet daily; Therapy: 84XGA8255 to (Last Rx:77Hhh1209)  Requested for: 90EPV8407 Ordered   5  Multiple Vitamins Oral Tablet Recorded  Medication List Reviewed: The medication list was reviewed and updated today  Allergies    1  Claritin   2  metformin    Physical Exam    Constitutional   General appearance: No acute distress, well appearing and well nourished  Eyes   Conjunctiva and lids: No swelling, erythema, or discharge  Pupils and irises: Equal, round and reactive to light  Ears, Nose, Mouth, and Throat   External inspection of ears and nose: Normal     Otoscopic examination: Tympanic membrance translucent with normal light reflex  Canals patent without erythema  Nasal mucosa, septum, and turbinates: Normal without edema or erythema  Oropharynx: Normal with no erythema, edema, exudate or lesions  Pulmonary   Respiratory effort: No increased work of breathing or signs of respiratory distress  Auscultation of lungs: Clear to auscultation, equal breath sounds bilaterally, no wheezes, no rales, no rhonci  Cardiovascular   Auscultation of heart: Normal rate and rhythm, normal S1 and S2, without murmurs  Examination of extremities for edema and/or varicosities: Normal     Abdomen   Abdomen: Non-tender, no masses  Lymphatic   Palpation of lymph nodes in neck: No lymphadenopathy      Musculoskeletal   Gait and station: Normal     Diabetic Foot Screen: Normal     Socks and shoes removed, the Right Foot: the foot was with a(n) Heels cm3 callus, but not swollen, not tender, not erythematous, not warm, not dry and without maceration  The toes on the right were normal  Normal tactile sensation with monofilament testing throughout the right foot  Socks and shoes removed, Left Foot: the foot was with a(n) callus and eels, but not swollen, not tender, not erythematous, not warm, not dry and wtihout maceration  The toes on the left were normal  Normal tactile sensation with monofilament testing throughout the left foot  Pulses:   2+ in the posterior tibialis on the right   2+ in the dorsalis pedis on the right  Pulses:   2+ in the posterior tibialis on the left   2+ in the dorsalis pedis on the left  Assign Risk Category: 0: No loss of protective sensation, no deformity  No present risk  Health Management  Abnormal CT scan, colon   COLONOSCOPY; every 5 years; Last 87Kap9267; Next Due: 20Ktm0137;  Active    Future Appointments    Date/Time Provider Specialty Site   09/21/2017 10:00 AM Lydia Fleischer, MD General Surgery [facility]     Signatures   Electronically signed by : Walt Cadet, [facility]; Sep 14 2017  2:33PM EST                       (Author)    Electronically signed by : Sandrita Suarez DO; Sep 14 2017  4:32PM EST                       (Co-author)

## 2018-01-22 VITALS
BODY MASS INDEX: 36.96 KG/M2 | WEIGHT: 303.5 LBS | HEIGHT: 76 IN | TEMPERATURE: 98.5 F | DIASTOLIC BLOOD PRESSURE: 80 MMHG | SYSTOLIC BLOOD PRESSURE: 130 MMHG | OXYGEN SATURATION: 97 % | HEART RATE: 68 BPM

## 2018-01-22 VITALS
BODY MASS INDEX: 36.9 KG/M2 | HEIGHT: 76 IN | SYSTOLIC BLOOD PRESSURE: 126 MMHG | TEMPERATURE: 98.4 F | DIASTOLIC BLOOD PRESSURE: 90 MMHG | WEIGHT: 303 LBS

## 2018-01-22 VITALS
SYSTOLIC BLOOD PRESSURE: 132 MMHG | TEMPERATURE: 97.8 F | WEIGHT: 311 LBS | BODY MASS INDEX: 37.86 KG/M2 | DIASTOLIC BLOOD PRESSURE: 88 MMHG

## 2018-01-23 NOTE — PROGRESS NOTES
History of Present Illness  Care Coordination Encounter Information:    Spoke to Patient  Care Coordination SL Nurse [facility]:   The reason for call is to discuss outreach for follow up/needed services, coordination of meeting care plan treatment goals and results  Goals met with patient at this time  He currently has no questions or concerns or needs services at this time  Continues to have contact information if needed  Will close from care coordination at this time  Active Problems    1  Abnormal CT scan, colon (793 4) (R93 3)   2  Abnormal transaminases (790 4) (R74 8)   3  Ankle pain, right (719 47) (M25 571)   4  Ankylosis of ankle and foot joint (718 57)   5  Changing skin lesion (709 9) (L98 9)   6  Diabetes mellitus type 2, uncontrolled (250 02) (E11 65)   7  Hyperlipidemia (272 4) (E78 5)   8  Hypertension (401 9) (I10)   9  Morbid obesity (278 01) (E66 01)   10  Postoperative state (V45 89) (Z98 890)   11  Rectal abscess (566) (K61 1)   12  Rectal bleeding (569 3) (K62 5)   13  Right bundle-branch block (426 4) (I45 10)   14  Screening for skin condition (V82 0) (Z13 89)   15  Scrotal cyst (706 2) (L72 9)   16  Seborrheic keratosis (702 19) (L82 1)   17  Sleep apnea (780 57) (G47 30)    Past Medical History    1  History of Benign essential hypertension (401 1) (I10)   2  History of diabetes mellitus (V12 29) (Z86 39)   3  History of hypercholesterolemia (V12 29) (Z86 39)   4  History of Morbid obesity due to excess calories (278 01) (E66 01)   5  History of Rectal bleeding (569 3) (K62 5)    Surgical History    1  History of Excision Lesion Of Meniscus Or Capsule Knee    Family History  Mother    1  Family history of Diabetes  Father    2  Family history of Hypertension    Social History    · Employed in professional specialty position   · Former smoker (B48 10) (B16 762)   ·    · Minimum alcohol consumption   · No drug use    Current Meds    1  Glimepiride 4 MG Oral Tablet;  Take 1 tablet daily; Therapy: 45OJA2559 to (Last Rx:14Nov2017)  Requested for: 55CKB1129 Ordered    2  Atorvastatin Calcium 20 MG Oral Tablet; Take 1 tablet daily; Therapy: 40XHS5139 to (Last Rx:07Aug2017)  Requested for: 15Zrs7403 Ordered    3  Atenolol-Chlorthalidone 50-25 MG Oral Tablet; Take 1 tablet daily; Therapy: 43WSV4655 to (Last UA:77MTC0006)  Requested for: 70FKQ3654 Ordered    4  Aspir-Low 81 MG Oral Tablet Delayed Release Recorded   5  Multiple Vitamins Oral Tablet Recorded    Allergies    1  Claritin   2  metformin    Health Management   COLONOSCOPY; every 5 years; Last 75Tpm5105; Next Due: 33Xez9813; Active    End of Encounter Meds    1  Glimepiride 4 MG Oral Tablet; Take 1 tablet daily; Therapy: 81CJG1480 to (Last Rx:14Nov2017)  Requested for: 95MVK5251 Ordered    2  Atorvastatin Calcium 20 MG Oral Tablet; Take 1 tablet daily; Therapy: 48MXF3725 to (Last Rx:96Xlz5408)  Requested for: 49Okz4473 Ordered    3  Atenolol-Chlorthalidone 50-25 MG Oral Tablet; Take 1 tablet daily; Therapy: 28VJC7913 to (Last UN:36WES4235)  Requested for: 53JFZ8216 Ordered    4  Aspir-Low 81 MG Oral Tablet Delayed Release Recorded   5  Multiple Vitamins Oral Tablet Recorded    Future Appointments    Date/Time Provider Specialty Site   12/19/2017 09:15 AM Nate Faust MD Urology [address] [place] [address]     Patient Care Team    Care Team Member Role Specialty Office Number   Ting MOON  Internal Medicine [phone]   [facility]  Orthopedic Surgery [phone]   Jocy MOON  Dermatology [phone]   Monica MOON    Gastroenterology Adult [phone], [facility]  Gastroenterology Adult [phone]     Signatures   Electronically signed by : Gisell Nuñez RN; Dec 19 2017  9:01AM EST                       (Author)

## 2018-08-03 ENCOUNTER — TELEPHONE (OUTPATIENT)
Dept: INTERNAL MEDICINE CLINIC | Facility: CLINIC | Age: 43
End: 2018-08-03

## 2018-08-03 NOTE — TELEPHONE ENCOUNTER
Patients wife Daphne Springer called, she made him a physical apt    She stated that he needs refills on his strips for his diabetes machine, she said that the retail mail order must have had the wrong fax #   She stated that would get them to fax it over again so she can get the testing strips for him    Best contact # [phone]

## 2018-08-07 ENCOUNTER — APPOINTMENT (OUTPATIENT)
Dept: LAB | Facility: CLINIC | Age: 43
End: 2018-08-07
Payer: COMMERCIAL

## 2018-08-07 ENCOUNTER — TELEPHONE (OUTPATIENT)
Dept: INTERNAL MEDICINE CLINIC | Facility: CLINIC | Age: 43
End: 2018-08-07

## 2018-08-07 ENCOUNTER — OFFICE VISIT (OUTPATIENT)
Dept: INTERNAL MEDICINE CLINIC | Facility: CLINIC | Age: 43
End: 2018-08-07
Payer: COMMERCIAL

## 2018-08-07 VITALS
WEIGHT: 249.5 LBS | BODY MASS INDEX: 30.37 KG/M2 | SYSTOLIC BLOOD PRESSURE: 120 MMHG | OXYGEN SATURATION: 95 % | HEART RATE: 79 BPM | TEMPERATURE: 98.5 F | DIASTOLIC BLOOD PRESSURE: 80 MMHG

## 2018-08-07 DIAGNOSIS — R06.01 ORTHOPNEA: ICD-10-CM

## 2018-08-07 DIAGNOSIS — R05.9 COUGH: ICD-10-CM

## 2018-08-07 DIAGNOSIS — I10 ESSENTIAL HYPERTENSION: Chronic | ICD-10-CM

## 2018-08-07 DIAGNOSIS — E11.9 TYPE 2 DIABETES MELLITUS WITHOUT COMPLICATION, WITHOUT LONG-TERM CURRENT USE OF INSULIN (HCC): Chronic | ICD-10-CM

## 2018-08-07 DIAGNOSIS — E11.9 TYPE 2 DIABETES MELLITUS WITHOUT COMPLICATION, WITHOUT LONG-TERM CURRENT USE OF INSULIN (HCC): Primary | Chronic | ICD-10-CM

## 2018-08-07 LAB
ALBUMIN SERPL BCP-MCNC: 3.6 G/DL (ref 3.5–5)
ALP SERPL-CCNC: 144 U/L (ref 46–116)
ALT SERPL W P-5'-P-CCNC: 43 U/L (ref 12–78)
ANION GAP SERPL CALCULATED.3IONS-SCNC: 5 MMOL/L (ref 4–13)
AST SERPL W P-5'-P-CCNC: 16 U/L (ref 5–45)
BILIRUB SERPL-MCNC: 0.51 MG/DL (ref 0.2–1)
BILIRUB UR QL STRIP: NEGATIVE
BUN SERPL-MCNC: 13 MG/DL (ref 5–25)
CALCIUM SERPL-MCNC: 8.8 MG/DL (ref 8.3–10.1)
CHLORIDE SERPL-SCNC: 102 MMOL/L (ref 100–108)
CLARITY UR: CLEAR
CO2 SERPL-SCNC: 30 MMOL/L (ref 21–32)
COLOR UR: NORMAL
CREAT SERPL-MCNC: 1.09 MG/DL (ref 0.6–1.3)
CREAT UR-MCNC: 228 MG/DL
EST. AVERAGE GLUCOSE BLD GHB EST-MCNC: 126 MG/DL
GFR SERPL CREATININE-BSD FRML MDRD: 82 ML/MIN/1.73SQ M
GLUCOSE SERPL-MCNC: 111 MG/DL (ref 65–140)
GLUCOSE UR STRIP-MCNC: NEGATIVE MG/DL
HBA1C MFR BLD: 6 % (ref 4.2–6.3)
HGB UR QL STRIP.AUTO: NEGATIVE
KETONES UR STRIP-MCNC: NEGATIVE MG/DL
L PNEUMO1 AG UR QL IA.RAPID: NEGATIVE
LEUKOCYTE ESTERASE UR QL STRIP: NEGATIVE
MICROALBUMIN UR-MCNC: 9.4 MG/L (ref 0–20)
MICROALBUMIN/CREAT 24H UR: 4 MG/G CREATININE (ref 0–30)
NITRITE UR QL STRIP: NEGATIVE
PH UR STRIP.AUTO: 6.5 [PH] (ref 4.5–8)
POTASSIUM SERPL-SCNC: 4.2 MMOL/L (ref 3.5–5.3)
PROT SERPL-MCNC: 8 G/DL (ref 6.4–8.2)
PROT UR STRIP-MCNC: NEGATIVE MG/DL
SODIUM SERPL-SCNC: 137 MMOL/L (ref 136–145)
SP GR UR STRIP.AUTO: 1.02 (ref 1–1.03)
TSH SERPL DL<=0.05 MIU/L-ACNC: 0.93 UIU/ML (ref 0.36–3.74)
UROBILINOGEN UR QL STRIP.AUTO: 1 E.U./DL

## 2018-08-07 PROCEDURE — 82043 UR ALBUMIN QUANTITATIVE: CPT | Performed by: INTERNAL MEDICINE

## 2018-08-07 PROCEDURE — 3061F NEG MICROALBUMINURIA REV: CPT | Performed by: INTERNAL MEDICINE

## 2018-08-07 PROCEDURE — 84443 ASSAY THYROID STIM HORMONE: CPT

## 2018-08-07 PROCEDURE — 87449 NOS EACH ORGANISM AG IA: CPT | Performed by: INTERNAL MEDICINE

## 2018-08-07 PROCEDURE — 80053 COMPREHEN METABOLIC PANEL: CPT

## 2018-08-07 PROCEDURE — 81003 URINALYSIS AUTO W/O SCOPE: CPT | Performed by: INTERNAL MEDICINE

## 2018-08-07 PROCEDURE — 93000 ELECTROCARDIOGRAM COMPLETE: CPT | Performed by: INTERNAL MEDICINE

## 2018-08-07 PROCEDURE — 86615 BORDETELLA ANTIBODY: CPT

## 2018-08-07 PROCEDURE — 3079F DIAST BP 80-89 MM HG: CPT | Performed by: INTERNAL MEDICINE

## 2018-08-07 PROCEDURE — 36415 COLL VENOUS BLD VENIPUNCTURE: CPT

## 2018-08-07 PROCEDURE — 83036 HEMOGLOBIN GLYCOSYLATED A1C: CPT

## 2018-08-07 PROCEDURE — 99214 OFFICE O/P EST MOD 30 MIN: CPT | Performed by: INTERNAL MEDICINE

## 2018-08-07 PROCEDURE — 82570 ASSAY OF URINE CREATININE: CPT | Performed by: INTERNAL MEDICINE

## 2018-08-07 PROCEDURE — 3074F SYST BP LT 130 MM HG: CPT | Performed by: INTERNAL MEDICINE

## 2018-08-07 RX ORDER — PREDNISONE 10 MG/1
TABLET ORAL
Qty: 30 TABLET | Refills: 0 | Status: SHIPPED | OUTPATIENT
Start: 2018-08-07 | End: 2018-08-23 | Stop reason: CLARIF

## 2018-08-07 RX ORDER — AZITHROMYCIN 250 MG/1
TABLET, FILM COATED ORAL
Qty: 6 TABLET | Refills: 0 | Status: SHIPPED | OUTPATIENT
Start: 2018-08-07 | End: 2018-08-12

## 2018-08-07 RX ORDER — IBUPROFEN 400 MG/1
400 TABLET ORAL EVERY 6 HOURS PRN
COMMUNITY
End: 2018-08-23 | Stop reason: CLARIF

## 2018-08-07 NOTE — PROGRESS NOTES
Assessment/Plan:       Diagnoses and all orders for this visit:    Cough    Orthopnea    Type 2 diabetes mellitus without complication, without long-term current use of insulin (HCC)    Essential hypertension    Other orders  -     ibuprofen (MOTRIN) 400 mg tablet; Take 400 mg by mouth every 6 (six) hours as needed for mild pain          There are no Patient Instructions on file for this visit  Subjective:      Patient ID: Carole Renteria is a 37 y o  male  Cough x3 days  78-year-old male  Diabetic hypertensive and dyslipidemic  Totally noncompliant to any follow-up  He has not taken medications since late 2017  Minimally productive cough with barky sensation and slight sputum x3 days  Associated with orthopnea  Cough gets worse when he lays down flat  I also sensation of some shortness of breath when that happens  No exertional symptoms  No hemoptysis  Reports fever up to 101  A nonsmoker  He went to [address] [place] [address] emergency room on Saturday  He was examined  There was no testing or x-ray  He was given amoxicillin  He has been taking amoxicillin but the situation has not improved  The following portions of the patient's history were reviewed and updated as appropriate:   He has a past medical history of Diabetes mellitus ([address] ); Hyperlipidemia; and Obesity  ,   does not have any pertinent problems on file  ,   has a past surgical history that includes Vasectomy; Incision and drainage of wound (Right, 9/7/2017); and Knee surgery  ,  family history includes Diabetes in his mother; Hypertension in his father  ,   reports that he quit smoking about 8 years ago  He has never used smokeless tobacco  He reports that he drinks alcohol  He reports that he does not use drugs  ,  is allergic to loratadine and metformin     Current Outpatient Prescriptions   Medication Sig Dispense Refill    ibuprofen (MOTRIN) 400 mg tablet Take 400 mg by mouth every 6 (six) hours as needed for mild pain      atenolol-chlorthalidone (TENORETIC) 50-25 mg per tablet Take by mouth      atorvastatin (LIPITOR) 20 mg tablet Take by mouth      cephalexin (KEFLEX) 500 mg capsule Take by mouth      glimepiride (AMARYL) 4 mg tablet Take by mouth      multivitamin (THERAGRAN) TABS Take 1 tablet by mouth daily       No current facility-administered medications for this visit  Review of Systems   Constitutional: Negative for chills and fever  HENT: Negative for sore throat and trouble swallowing  Eyes: Negative for pain  Respiratory: Positive for cough and shortness of breath  Cardiovascular: Negative for chest pain and palpitations  Gastrointestinal: Negative for abdominal pain, blood in stool, diarrhea, nausea and vomiting  Endocrine: Negative for cold intolerance and heat intolerance  Genitourinary: Negative for dysuria, frequency and testicular pain  Musculoskeletal: Negative for joint swelling  Allergic/Immunologic: Negative for immunocompromised state  Neurological: Negative for dizziness, syncope and headaches  Hematological: Negative for adenopathy  Does not bruise/bleed easily  Psychiatric/Behavioral: Negative for dysphoric mood  The patient is not nervous/anxious  Objective:  Vitals:    08/07/18 1644   BP: 120/80   Pulse: 79   Temp: 98 5 °F (36 9 °C)   SpO2: 95%      Physical Exam   Constitutional: He is oriented to person, place, and time  He appears well-developed and well-nourished  An alert patient who appears robust   He is not in distress  Not tachypneic, not dyspneic, not using accessory muscles  However, verbalizing the above complaints and also stating that he feels a bit short of breath when he talks in long sentences  HENT:   Head: Normocephalic and atraumatic  Eyes: Pupils are equal, round, and reactive to light  Neck: Normal range of motion  Neck supple  No tracheal deviation present  No thyromegaly present     Cardiovascular: Normal rate, regular rhythm and normal heart sounds  Exam reveals no gallop  No murmur heard  Pulmonary/Chest: Effort normal  No respiratory distress  He has no wheezes  He has rhonchi in the right lower field and the left lower field  He has no rales  Bilateral lower field rhonchi are faint but are inducible with forced expiration   Abdominal: Soft  Bowel sounds are normal  There is no tenderness  Musculoskeletal: Normal range of motion  He exhibits no tenderness or deformity  Neurological: He is alert and oriented to person, place, and time  He has normal reflexes  Coordination normal    Skin: Skin is warm and dry  Psychiatric: He has a normal mood and affect

## 2018-08-07 NOTE — PATIENT INSTRUCTIONS
Fever cough, worsened with recumbency, rhonchi on examination    Working diagnosis of acute bronchitis  Plan:  X-ray of chest in paranasal sinuses to exclude sinusitis and pneumonia  Laboratory testing  Continue amoxicillin  Also takes Zithromax and a course of steroids  Follow-up here in 48 hours

## 2018-08-08 ENCOUNTER — APPOINTMENT (OUTPATIENT)
Dept: RADIOLOGY | Facility: CLINIC | Age: 43
End: 2018-08-08
Payer: COMMERCIAL

## 2018-08-08 DIAGNOSIS — R06.01 ORTHOPNEA: ICD-10-CM

## 2018-08-08 DIAGNOSIS — I10 ESSENTIAL HYPERTENSION: Chronic | ICD-10-CM

## 2018-08-08 DIAGNOSIS — E11.9 TYPE 2 DIABETES MELLITUS WITHOUT COMPLICATION, WITHOUT LONG-TERM CURRENT USE OF INSULIN (HCC): Chronic | ICD-10-CM

## 2018-08-08 DIAGNOSIS — E11.9 TYPE 2 DIABETES MELLITUS WITHOUT COMPLICATION, UNSPECIFIED WHETHER LONG TERM INSULIN USE (HCC): Primary | ICD-10-CM

## 2018-08-08 DIAGNOSIS — R05.9 COUGH: ICD-10-CM

## 2018-08-08 PROCEDURE — 70220 X-RAY EXAM OF SINUSES: CPT

## 2018-08-08 PROCEDURE — 71046 X-RAY EXAM CHEST 2 VIEWS: CPT

## 2018-08-08 RX ORDER — BLOOD SUGAR DIAGNOSTIC
STRIP MISCELLANEOUS DAILY
Qty: 100 EACH | Refills: 5 | Status: SHIPPED | OUTPATIENT
Start: 2018-08-08

## 2018-08-08 NOTE — TELEPHONE ENCOUNTER
Patient was here yesterday and said he needs [facility] lancets & test strips sent to [facility]    He has been without for 2 weeks and asked that this be done TODAY  Rosella Goldmann Please  Rosella Goldmann He also said his Dea Flower has been sending requests here  Leopoldo Holman [phone]   [phone]

## 2018-08-09 ENCOUNTER — OFFICE VISIT (OUTPATIENT)
Dept: INTERNAL MEDICINE CLINIC | Facility: CLINIC | Age: 43
End: 2018-08-09
Payer: COMMERCIAL

## 2018-08-09 VITALS
WEIGHT: 247.4 LBS | SYSTOLIC BLOOD PRESSURE: 140 MMHG | HEART RATE: 69 BPM | BODY MASS INDEX: 30.13 KG/M2 | HEIGHT: 76 IN | DIASTOLIC BLOOD PRESSURE: 78 MMHG | OXYGEN SATURATION: 95 % | TEMPERATURE: 98.1 F

## 2018-08-09 DIAGNOSIS — E11.9 TYPE 2 DIABETES MELLITUS WITHOUT COMPLICATION, WITHOUT LONG-TERM CURRENT USE OF INSULIN (HCC): Chronic | ICD-10-CM

## 2018-08-09 DIAGNOSIS — J40 BRONCHITIS: Primary | ICD-10-CM

## 2018-08-09 PROCEDURE — 3008F BODY MASS INDEX DOCD: CPT | Performed by: PHYSICIAN ASSISTANT

## 2018-08-09 PROCEDURE — 99214 OFFICE O/P EST MOD 30 MIN: CPT | Performed by: PHYSICIAN ASSISTANT

## 2018-08-09 NOTE — PROGRESS NOTES
Assessment/Plan:     resolving bronchitis -chest x-ray and sinus film are pending as is the Bortadella pertussis AB  Patient will continue and finish the antibiotics and a prednisone taper  Diabetes- patient is presently diet controlled  He is noticing some elevated sugars on the prednisone but he is reassured that this should go away once the prednisone is finished  He says he will adjust his diet in the next 7-10 days while he is still on the prednisone to help lower his sugars  He is congratulated and advised to continue the good work with his diet and exercise  His hemoglobin A1c is 6 0 off meds     the case was discussed with Dr Abdulkadir Tafoya who agrees with the assessment and plan    No problem-specific Assessment & Plan notes found for this encounter  There are no diagnoses linked to this encounter  Subjective:      Patient ID: Daljit Solis is a 37 y o  male  Patient comes in for a 48 hr follow-up  He was seen here 2 days ago by Dr Tao Lux who was treating him for bronchitis  He did some routine and acute blood work as well as had a chest x-ray and sinus film  The chest x-ray and sinus film are not resulted yet  His metabolic panel was normal, his Legionella antigen was negative, he had a Bordetella pertussis antibody done but is pending  He also had a TSH which was normal and most importantly his hemoglobin A1c was 6 0  His last hemoglobin A1c was 8 4 a year ago  The patient is a diabetic but stopped his glimepiride back in November of 2017  He made drastic changes to his diet and exercise regimen  He says he lost about 65 lb since then  He has been doing karate and other exercises and doing strict meal plans with very low carbs  He also checks his sugars and he says they are very good  He feels 50% or more better since 2 days ago  He was put on a Z-Ken by Dr Abdulkadir Tafoya and a prednisone taper    He also went to the ER prior to seeing Dr Abdulkadir Tafoya and was given amoxicillin which she is also still taking  He says his coughing fits are much less he can lay down and sleep now without coughing  He is producing less mucus and he no longer has any fevers        The following portions of the patient's history were reviewed and updated as appropriate: allergies, current medications, past family history, past medical history, past social history, past surgical history and problem list     Review of Systems   Constitutional: Negative for chills, fatigue and fever  HENT: Negative for congestion, postnasal drip, rhinorrhea, sinus pain, sinus pressure and sore throat  Respiratory: Positive for cough  Negative for chest tightness, shortness of breath and wheezing  Cardiovascular: Negative for chest pain and palpitations  Gastrointestinal: Negative for abdominal pain, diarrhea and nausea  Objective:      /78   Pulse 69   Temp 98 1 °F (36 7 °C) (Oral)   Ht 6' 4" (1 93 m)   Wt 112 kg (247 lb 6 4 oz)   SpO2 95%   BMI 30 11 kg/m²          Physical Exam   Constitutional: He appears well-developed and well-nourished  HENT:   Head: Normocephalic and atraumatic  Right Ear: External ear normal    Left Ear: External ear normal    Mouth/Throat: No oropharyngeal exudate  Neck: Normal range of motion  Cardiovascular: Normal rate, regular rhythm and normal heart sounds  Pulmonary/Chest: Effort normal  No respiratory distress  He has rhonchi in the right middle field and the left middle field  Slight expiratory rhonchi in the middle lobes, otherwise clear   Abdominal: Soft  Bowel sounds are normal    Lymphadenopathy:     He has no cervical adenopathy

## 2018-08-09 NOTE — LETTER
August 9, 2018     Patient: Cece Hernandez   YOB: 1975   Date of Visit: 8/9/2018       To Whom it May Concern:    Cece Hernandez is under my professional care  He was seen in my office on 8/9/2018  He was also seen on 8/7/18  He may return to work on 8/10/18  Please excuse missed work on 8/5, 8/6 and 8/7  If you have any questions or concerns, please don't hesitate to call           Sincerely,          Nimo Walker PA-C        CC: No Recipients

## 2018-08-10 LAB
B PERT IGA SER QL IA: <1 INDEX (ref 0–0.9)
B PERT IGG SER-ACNC: <0.95 INDEX (ref 0–0.94)
B PERT IGM SER QL IA: <1 INDEX (ref 0–0.9)

## 2018-08-23 ENCOUNTER — OFFICE VISIT (OUTPATIENT)
Dept: INTERNAL MEDICINE CLINIC | Facility: CLINIC | Age: 43
End: 2018-08-23
Payer: COMMERCIAL

## 2018-08-23 VITALS
DIASTOLIC BLOOD PRESSURE: 92 MMHG | BODY MASS INDEX: 30.21 KG/M2 | SYSTOLIC BLOOD PRESSURE: 142 MMHG | OXYGEN SATURATION: 97 % | HEIGHT: 75 IN | WEIGHT: 243 LBS | HEART RATE: 75 BPM

## 2018-08-23 DIAGNOSIS — E11.9 TYPE 2 DIABETES MELLITUS WITHOUT COMPLICATION, WITHOUT LONG-TERM CURRENT USE OF INSULIN (HCC): Chronic | ICD-10-CM

## 2018-08-23 DIAGNOSIS — I10 ESSENTIAL HYPERTENSION: Primary | Chronic | ICD-10-CM

## 2018-08-23 PROBLEM — R05.9 COUGH: Status: RESOLVED | Noted: 2018-08-07 | Resolved: 2018-08-23

## 2018-08-23 PROBLEM — K61.1 PERIRECTAL ABSCESS: Status: RESOLVED | Noted: 2017-09-07 | Resolved: 2018-08-23

## 2018-08-23 PROCEDURE — 99396 PREV VISIT EST AGE 40-64: CPT | Performed by: INTERNAL MEDICINE

## 2018-08-23 RX ORDER — ASPIRIN 81 MG/1
81 TABLET ORAL DAILY
COMMUNITY
End: 2019-04-25

## 2018-08-23 NOTE — PATIENT INSTRUCTIONS
Doing quite well with dietary and lifestyle modification  Hemoglobin A1c has normalized    You are well on your way to 200 lb   Colonoscopy done   Follow-up in March 2019 for your next birthday

## 2018-08-23 NOTE — PROGRESS NOTES
Assessment/Plan:       Diagnoses and all orders for this visit:    Essential hypertension    Type 2 diabetes mellitus without complication, without long-term current use of insulin (HCC)    Other orders  -     aspirin (ECOTRIN LOW STRENGTH) 81 mg EC tablet; Take 81 mg by mouth daily          Patient Instructions    Doing quite well with dietary and lifestyle modification  Hemoglobin A1c has normalized  You are well on your way to 200 lb   Colonoscopy done   Follow-up in March 2019 for your   next birthday        Subjective:      Patient ID: Autumn Carrillo is a 37 y o  male  Follow-up visit  This 43-year-old man was formally morbidly obese, greater than 300 pounds  He had concomitant hypertension, diabetic, hyperlipidemic  He 1 on a program of dietary modification and weight loss and has managed to lose over 70 pounds  His hemoglobin A1c has normalized  Seen about 2 weeks ago for bronchopneumonia and treated with antibiotics; this has resolved and now he feels well; 12 point ROS negative  Colonoscopy done        The following portions of the patient's history were reviewed and updated as appropriate:   He has a past medical history of Diabetes mellitus ([address] ); Hyperlipidemia; and Obesity  ,   does not have any pertinent problems on file  ,   has a past surgical history that includes Vasectomy; Incision and drainage of wound (Right, 9/7/2017); and Knee surgery  ,  family history includes Diabetes in his mother; Hypertension in his father  ,   reports that he quit smoking about 8 years ago  He has never used smokeless tobacco  He reports that he drinks alcohol  He reports that he does not use drugs  ,  is allergic to loratadine and metformin     Current Outpatient Prescriptions   Medication Sig Dispense Refill    ADVOCATE LANCETS MISC by Does not apply route daily Patient Tests Once a Day   DX: E11 9 100 each 5    aspirin (ECOTRIN LOW STRENGTH) 81 mg EC tablet Take 81 mg by mouth daily      glucose blood test strip 1 each by Other route daily Patient Tests Once a Day  DX: E11 9 100 each 5    multivitamin (THERAGRAN) TABS Take 1 tablet by mouth daily       No current facility-administered medications for this visit  Review of Systems   Constitutional: Negative for chills and fever  HENT: Negative for sore throat and trouble swallowing  Eyes: Negative for pain  Respiratory: Negative for cough and shortness of breath  Cardiovascular: Negative for chest pain and palpitations  Gastrointestinal: Negative for abdominal pain, blood in stool, diarrhea, nausea and vomiting  Endocrine: Negative for cold intolerance and heat intolerance  Genitourinary: Negative for dysuria, frequency and testicular pain  Musculoskeletal: Negative for joint swelling  Allergic/Immunologic: Negative for immunocompromised state  Neurological: Negative for dizziness, syncope and headaches  Hematological: Negative for adenopathy  Does not bruise/bleed easily  Psychiatric/Behavioral: Negative for dysphoric mood  The patient is not nervous/anxious  Objective:  Vitals:    08/23/18 1404   BP: 142/92   Pulse: 75   SpO2: 97%      Physical Exam   Constitutional: He is oriented to person, place, and time  He appears well-developed and well-nourished  HENT:   Head: Normocephalic and atraumatic  Eyes: Pupils are equal, round, and reactive to light  Neck: Normal range of motion  Neck supple  No tracheal deviation present  No thyromegaly present  Cardiovascular: Normal rate, regular rhythm and normal heart sounds  Exam reveals no gallop  No murmur heard  Pulmonary/Chest: Effort normal  No respiratory distress  He has no wheezes  He has no rales  Abdominal: Soft  Bowel sounds are normal  There is no tenderness  Musculoskeletal: Normal range of motion  He exhibits no tenderness or deformity  Neurological: He is alert and oriented to person, place, and time  He has normal reflexes   Coordination normal  Skin: Skin is warm and dry  Psychiatric: He has a normal mood and affect

## 2018-09-06 LAB
LEFT EYE DIABETIC RETINOPATHY: NORMAL
RIGHT EYE DIABETIC RETINOPATHY: NORMAL

## 2018-09-06 PROCEDURE — 3072F LOW RISK FOR RETINOPATHY: CPT | Performed by: INTERNAL MEDICINE

## 2018-09-10 ENCOUNTER — TELEPHONE (OUTPATIENT)
Dept: INTERNAL MEDICINE CLINIC | Facility: CLINIC | Age: 43
End: 2018-09-10

## 2018-09-10 NOTE — TELEPHONE ENCOUNTER
Patient was in on Aug 23 for a annual physical, but was bill for a copay, the billing office said it was coded wrong    Can you please advise  Dimitri Sanchez

## 2018-11-08 ENCOUNTER — OFFICE VISIT (OUTPATIENT)
Dept: OBGYN CLINIC | Facility: CLINIC | Age: 43
End: 2018-11-08
Payer: COMMERCIAL

## 2018-11-08 ENCOUNTER — APPOINTMENT (OUTPATIENT)
Dept: RADIOLOGY | Facility: CLINIC | Age: 43
End: 2018-11-08
Payer: COMMERCIAL

## 2018-11-08 VITALS — WEIGHT: 243 LBS | BODY MASS INDEX: 30.21 KG/M2 | HEIGHT: 75 IN

## 2018-11-08 DIAGNOSIS — M25.511 CHRONIC RIGHT SHOULDER PAIN: ICD-10-CM

## 2018-11-08 DIAGNOSIS — M25.311 SHOULDER INSTABILITY, RIGHT: ICD-10-CM

## 2018-11-08 DIAGNOSIS — M25.811 CLICKING RIGHT SHOULDER: ICD-10-CM

## 2018-11-08 DIAGNOSIS — M25.511 CHRONIC RIGHT SHOULDER PAIN: Primary | ICD-10-CM

## 2018-11-08 DIAGNOSIS — G89.29 CHRONIC RIGHT SHOULDER PAIN: Primary | ICD-10-CM

## 2018-11-08 DIAGNOSIS — G89.29 CHRONIC RIGHT SHOULDER PAIN: ICD-10-CM

## 2018-11-08 DIAGNOSIS — M19.011 GLENOHUMERAL ARTHRITIS, RIGHT: ICD-10-CM

## 2018-11-08 PROCEDURE — 73030 X-RAY EXAM OF SHOULDER: CPT

## 2018-11-08 PROCEDURE — 99244 OFF/OP CNSLTJ NEW/EST MOD 40: CPT | Performed by: FAMILY MEDICINE

## 2018-11-08 NOTE — PROGRESS NOTES
Assessment/Plan:  Assessment/Plan   Diagnoses and all orders for this visit:    Chronic right shoulder pain  -     XR shoulder 2+ vw right; Future  -     MRI arthrogram right shoulder; Future  -     FL injection right shoulder (arthrogram); Future  -     FL injection right shoulder (non arthrogram); Future    Clicking right shoulder  -     XR shoulder 2+ vw right; Future  -     MRI arthrogram right shoulder; Future  -     FL injection right shoulder (arthrogram); Future    Shoulder instability, right  -     XR shoulder 2+ vw right; Future  -     MRI arthrogram right shoulder; Future  -     FL injection right shoulder (arthrogram); Future    Glenohumeral arthritis, right        59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, clicking, and instability more than 1 year duration  Discussed with patient physical exam, radiographs, impression, and plan  X-ray the shoulder noted for glenohumeral joint degenerative changes  Physical exam is noted for mild tenderness of the upper trapezius  He demonstrates normal forward flexion and abduction however with pain and there is palpable clunking of the shoulder with range of motion testing  Internal rotation limited to the sacrum  He does have normal strength and there is positive Kerr's testing  Clinical impression is that he has labral pathology  Since he is now more than 1 year since onset and has failed conservative therapy in the form of oral anti-inflammatory and home exercise program I will refer him for MRI arthrogram of the shoulder to evaluate for soft tissue abnormality  I will also have him undergo fluoroscopic guided glenohumeral joint corticosteroid injection  He will follow up with me 3 weeks after injection for re-evaluation  Subjective:   Patient ID: Vladimir Alberto is a 37 y o  male    Chief Complaint   Patient presents with    Right Shoulder - Pain       59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 right shoulder pain more than 1 year duration  He denies any one particular trauma or inciting event  Pain described as gradual onset, localized to "inside"the shoulder, sharp, intermittent, nonradiating, worse with movement of the arm, and associated with limited range of motion in reaching behind his back, and repetitive clicking and popping of the shoulder  He denies any associated numbness/tingling  Since onset of pain he has been taking ibuprofen, and he has also been continuing with his martial arts  He has also been doing a home exercise program which includes strengthening exercises of the upper body and shoulders  He does report pain that is worse with certain exercises which includes raising the arm above shoulder level and with doing pushups  Shoulder Pain   This is a chronic problem  The current episode started more than 1 year ago  The problem occurs intermittently  The problem has been unchanged  Associated symptoms include arthralgias  Pertinent negatives include no abdominal pain, chest pain, chills, fever, joint swelling, numbness, rash, sore throat or weakness  Exacerbated by: Arm elevation  He has tried rest and NSAIDs (Home exercise, strengthening) for the symptoms  The treatment provided no relief  The following portions of the patient's history were reviewed and updated as appropriate: He  has a past medical history of Diabetes mellitus ([address] ); Hyperlipidemia; and Obesity  He  has a past surgical history that includes Vasectomy; Incision and drainage of wound (Right, 9/7/2017); and Knee surgery  His family history includes Diabetes in his mother; Hypertension in his father; Thyroid disease in his mother  He  reports that he quit smoking about 8 years ago  He has never used smokeless tobacco  He reports that he drinks alcohol  He reports that he does not use drugs  He is allergic to loratadine and metformin       Review of Systems   Constitutional: Negative for chills and fever  HENT: Negative for sore throat  Eyes: Negative for visual disturbance  Respiratory: Negative for shortness of breath  Cardiovascular: Negative for chest pain  Gastrointestinal: Negative for abdominal pain  Genitourinary: Negative for flank pain  Musculoskeletal: Positive for arthralgias  Negative for joint swelling  Skin: Negative for rash and wound  Neurological: Negative for weakness and numbness  Hematological: Does not bruise/bleed easily  Psychiatric/Behavioral: Negative for self-injury  Objective:  Vitals:    11/08/18 1508   Weight: 110 kg (243 lb)   Height: 6' 3" (1 905 m)     Right Elbow Exam     Tenderness   The patient is experiencing no tenderness  Range of Motion   The patient has normal right elbow ROM  Muscle Strength   The patient has normal right elbow strength  Right Shoulder Exam     Tenderness   Right shoulder tenderness location: Trapezius  Range of Motion   Active Abduction: normal   Forward Flexion: normal   Internal Rotation 0 degrees: Sacrum     Muscle Strength   The patient has normal right shoulder strength  Comments:  Positive [place]'s  Negative belly press  Negative push-off            Physical Exam   Constitutional: He is oriented to person, place, and time  He appears well-developed  No distress  HENT:   Head: Normocephalic and atraumatic  Eyes: Conjunctivae are normal    Neck: No tracheal deviation present  Cardiovascular: Normal rate  Pulmonary/Chest: Effort normal  No respiratory distress  Abdominal: He exhibits no distension  Neurological: He is alert and oriented to person, place, and time  Skin: Skin is warm and dry  Psychiatric: He has a normal mood and affect  His behavior is normal    Nursing note and vitals reviewed  I have personally reviewed pertinent films in PACS and my interpretation is Glenohumeral joint arthritis

## 2018-11-10 ENCOUNTER — TRANSCRIBE ORDERS (OUTPATIENT)
Dept: ADMINISTRATIVE | Facility: HOSPITAL | Age: 43
End: 2018-11-10

## 2018-11-19 DIAGNOSIS — M25.811 CLICKING RIGHT SHOULDER: Primary | ICD-10-CM

## 2018-11-19 DIAGNOSIS — M25.311 SHOULDER JOINT INSTABILITY, RIGHT: ICD-10-CM

## 2018-11-20 ENCOUNTER — TELEPHONE (OUTPATIENT)
Dept: OBGYN CLINIC | Facility: HOSPITAL | Age: 43
End: 2018-11-20

## 2018-11-20 NOTE — TELEPHONE ENCOUNTER
Caller: patient  Cb#: [phone]  Dr Estrada      Patient sees Dr Estrada for right shoulder  Patient called in wanting to know if he can get a script of PT for his left shoulder as well  He states he is getting sharp pain and he spoke to PT and they stated If  writes a script for the left shoulder they will treat him for both  Please advise, thank you

## 2018-12-05 ENCOUNTER — OFFICE VISIT (OUTPATIENT)
Dept: OBGYN CLINIC | Facility: CLINIC | Age: 43
End: 2018-12-05
Payer: COMMERCIAL

## 2018-12-05 ENCOUNTER — APPOINTMENT (OUTPATIENT)
Dept: RADIOLOGY | Facility: CLINIC | Age: 43
End: 2018-12-05
Payer: COMMERCIAL

## 2018-12-05 VITALS
BODY MASS INDEX: 19.02 KG/M2 | SYSTOLIC BLOOD PRESSURE: 162 MMHG | HEART RATE: 69 BPM | WEIGHT: 153 LBS | DIASTOLIC BLOOD PRESSURE: 95 MMHG | HEIGHT: 75 IN

## 2018-12-05 DIAGNOSIS — M25.512 CHRONIC LEFT SHOULDER PAIN: ICD-10-CM

## 2018-12-05 DIAGNOSIS — M62.838 TRAPEZIUS MUSCLE SPASM: ICD-10-CM

## 2018-12-05 DIAGNOSIS — G89.29 CHRONIC LEFT SHOULDER PAIN: ICD-10-CM

## 2018-12-05 DIAGNOSIS — M89.9 BONE LESION: Primary | ICD-10-CM

## 2018-12-05 DIAGNOSIS — M19.012 GLENOHUMERAL ARTHRITIS, LEFT: ICD-10-CM

## 2018-12-05 DIAGNOSIS — M75.42 SUBACROMIAL IMPINGEMENT OF LEFT SHOULDER: ICD-10-CM

## 2018-12-05 PROCEDURE — 99213 OFFICE O/P EST LOW 20 MIN: CPT | Performed by: FAMILY MEDICINE

## 2018-12-05 PROCEDURE — 73030 X-RAY EXAM OF SHOULDER: CPT

## 2018-12-05 NOTE — PROGRESS NOTES
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  Discussed with patient physical exam, radiographs, impression and plan  X-rays of the left shoulder noted for glenoid arthritis osteophyte inferior aspect of the humeral head, and there is noted intramedullary lesion of the proximal femur with irregular margins  Physical exam noted for trapezius spasm with range of motion limited to forward flexion of 160°, abduction 90°, and internal rotation to the lumbar spine  He has normal strength and there is pain with Mcbride maneuver and with axial load and grind  Clinic impression is subacromial impingement with possible labral pathology, and there is finding of suspicious bony lesion in the proximal humerus  At this time I will refer him for MRI of left shoulder to evaluate the characteristics of the bone lesion, and occult osseous and soft tissue abnormality  He will follow up with me after getting MRI done  Subjective:   Patient ID: Mario Cuba is a 37 y o  male  Chief Complaint   Patient presents with    Left Shoulder - Pain       49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 left shoulder pain nearly 1 year duration  He denies any trauma or inciting event  He has been having right shoulder pain more than 1 year, reports that left shoulder pain started shortly after onset of right shoulder pain    Pain described as gradual onset, intermittent, achy and sometimes sharp, localized to "inside"the shoulder, nonradiating, worse with elevation of the arm, and associated with limited range of motion  He has been continue with his martial arts activities however has decreased intensity level  Shoulder Pain   This is a chronic problem  The current episode started more than 1 month ago  The problem occurs intermittently  The problem has been gradually worsening  Associated symptoms include arthralgias  Pertinent negatives include no joint swelling, numbness or weakness  Exacerbated by: Arm use  He has tried rest (Home exercise program) for the symptoms  The treatment provided mild relief  Review of Systems   Musculoskeletal: Positive for arthralgias  Negative for joint swelling  Neurological: Negative for weakness and numbness  Objective:  Vitals:    12/05/18 1327   BP: 162/95   Pulse: 69   Weight: 69 4 kg (153 lb)   Height: 6' 3" (1 905 m)     Left Elbow Exam     Tenderness   The patient is experiencing no tenderness  Range of Motion   The patient has normal left elbow ROM  Muscle Strength   The patient has normal left elbow strength  Left Shoulder Exam     Tenderness   Left shoulder tenderness location: Trapezius  Range of Motion   The patient has normal left shoulder ROM  Active Abduction: 90   Forward Flexion: 160   Internal Rotation 0 degrees: Lumbar     Muscle Strength   The patient has normal left shoulder strength  Tests   Hawkin's test: positive    Comments:  Positive axial load and grind  Negative belly press  Negative push-off            Physical Exam   Constitutional: He is oriented to person, place, and time  He appears well-developed  No distress  HENT:   Head: Normocephalic and atraumatic  Eyes: Conjunctivae are normal    Neck: No tracheal deviation present  Cardiovascular: Normal rate  Pulmonary/Chest: Effort normal  No respiratory distress  Abdominal: He exhibits no distension  Neurological: He is alert and oriented to person, place, and time     Skin: Skin is warm and dry    Psychiatric: He has a normal mood and affect  His behavior is normal    Nursing note and vitals reviewed  I have personally reviewed pertinent films in PACS and my interpretation is Left shoulder glenohumeral arthritis  The intramedullary lesion proximal humerus with irregular margins

## 2018-12-06 ENCOUNTER — EVALUATION (OUTPATIENT)
Dept: PHYSICAL THERAPY | Facility: CLINIC | Age: 43
End: 2018-12-06
Payer: COMMERCIAL

## 2018-12-06 DIAGNOSIS — M25.311 SHOULDER JOINT INSTABILITY, RIGHT: Primary | ICD-10-CM

## 2018-12-06 DIAGNOSIS — M25.811 CLICKING RIGHT SHOULDER: ICD-10-CM

## 2018-12-06 PROCEDURE — 97110 THERAPEUTIC EXERCISES: CPT | Performed by: PHYSICAL THERAPIST

## 2018-12-06 PROCEDURE — G8990 OTHER PT/OT CURRENT STATUS: HCPCS | Performed by: PHYSICAL THERAPIST

## 2018-12-06 PROCEDURE — G8991 OTHER PT/OT GOAL STATUS: HCPCS | Performed by: PHYSICAL THERAPIST

## 2018-12-06 PROCEDURE — 97161 PT EVAL LOW COMPLEX 20 MIN: CPT | Performed by: PHYSICAL THERAPIST

## 2018-12-06 NOTE — PROGRESS NOTES
PT Evaluation     Today's date: 2018  Patient name: Gema Childs  : 1975  MRN: [id number]  Referring provider: Nadia Mcleod DO  Dx:   Encounter Diagnosis     ICD-10-CM    1  Shoulder joint instability, right M25 311 Ambulatory referral to Physical Therapy   2  Clicking right shoulder M25 811 Ambulatory referral to Physical Therapy                  Assessment  Assessment details: Patient was provided a home exercise program and demonstrated an understanding of exercises  Patient was advised to stop performing home exercise program if symptoms increase or new complaints developed  Verbal understanding demonstrated regarding home exercise program instructions  Patient would benefit from skilled physical therapy services for prescribed exercises, manual interventions, neuromuscular re-education, education, and modalities as deemed appropriate to assist patient in achieving their maximum level of function  Patient presents with c/o R > L shoulder pain -   We are only initiating tx to Right shoulder at this time pending MRI Left shoulder to further assess suspicious lesion proximal humerus region  Patient presents with decreased postural awareness and self correction - he has lost end range movement right shoulder due to pain elicited with stretching/ active movements  Patient has mild strength deficits scapular region, ER bilaterally and flexion  Impairments: abnormal or restricted ROM, activity intolerance, impaired physical strength, lacks appropriate home exercise program, pain with function and poor posture   Understanding of Dx/Px/POC: good   Prognosis: good    Goals  STG  1  Decrease pain by at least two subjective ratings in 4 weeks  2  Increase AROM by at least 25-50%  4 weeks   3  Improved postural awareness and self correction  4 weeks  LTG  1  Independent with HEP  2   Reduced pain Right shoulder > 75% 6 weeks   3    Reduced clicking bilateral shoulder by 50% per patient account  6-8 weeks  Plan  Plan details: Patient response to treatment will be monitored each session and progressed accordingly  Thank you for this referral   Patient would benefit from: skilled PT  Planned modality interventions: cryotherapy  Other planned modality interventions: Kinesiotape bilateral shoulders  Planned therapy interventions: flexibility, graded exercise, home exercise program, therapeutic exercise, strengthening, stretching, patient education, neuromuscular re-education, manual therapy, joint mobilization and postural training  Frequency: 2x week  Duration in weeks: 4  Treatment plan discussed with: patient        Subjective Evaluation    History of Present Illness  Mechanism of injury: Patient reports that he has had a sore right shoulder off/ on x years  He reports that he started working out heavier this year resulting in a 80lb weight loss - he doesn't however recall any particular event that brought on his pain  Patient works in [employer] and has for 20+ years  This may be a long term contributor to his symptoms  Patient also complains of left shoulder symptoms/ pain starting recently - he sought medical consultation of this shoulder and is being referred for MRI secondary to "suspicious lesion"  He is pending MRI to right shoulder - but needs insurance approval which will come following PT  Pain  Current pain ratin  At best pain ratin  At worst pain rating: 10  Quality: sharp  Aggravating factors: overhead activity and lifting  Symptom course: worse since 2018      Social Support  Lives with: spouse and young children    Employment status: working  Hand dominance: right    Patient Goals  Patient goals for therapy: decreased pain, increased strength, return to sport/leisure activities and increased motion  Patient goal: "To be able to use my shoulders fully"        Objective     Postural Observations  Seated posture: fair  Standing posture: good  Correction of posture: makes symptoms better        Observations     Additional Observation Details  Bilateral Acromion elevated - patient tends to demonstrate slight fhp, shoulders IR - minimal protraction bilaterally  Palpation     Right   Muscle spasm in the latissimus  Tenderness of the supraspinatus  Right Shoulder Comments  Right biceps: (+) tenderness but the tenderness is felt "deep" in the joint  Right supraspinatus: deep pain with palpation on surface produced        Tenderness     Right Shoulder  Tenderness in the acromion, biceps tendon (proximal) and supraspinatus tendon  No tenderness in the [facility] joint  Cervical/Thoracic Screen   Cervical range of motion within normal limits    Neurological Testing     Sensation     Shoulder   Left Shoulder   Intact: light touch    Right Shoulder   Intact: light touch    Active Range of Motion     Right Shoulder   Flexion: 150 degrees with pain  Abduction: 165 degrees     Additional Active Range of Motion Details  Patient able to achieve functional AROM all planes in sitting- yet demonstrates competency to achieve   He has limited Flexion > abduction, IR > ER  He has pain with putting jacket, shirt on, tucking in shirt in back region  ER - to C7   IR - to PSIS region      Passive Range of Motion     Right Shoulder   Flexion: 160 degrees with pain  Abduction: 170 degrees   External rotation 90°: 60 degrees with pain  Internal rotation 90°: 30 degrees with pain    Scapular Mobility   Left Shoulder   Scapular mobility: fair    Right Shoulder   Scapular mobility: fair    Strength/Myotome Testing     Right Shoulder     Planes of Motion   Flexion: WFL   Extension: WFL   Abduction: 4+   Adduction: WFL   External rotation at 45°: 4+   Internal rotation at 45°: WFL     Isolated Muscles   Biceps: 5   Triceps: 5   Upper trapezius: 5     Tests     Right Shoulder   Positive active compression ([place]), crossover and Hawkin's     Negative apprehension, belly press, Speed's and Saul's             Precautions: R shoulder pain , Left shoulder pain - ( hold treatment on Left side pending MRI due to "suspicisous lesion proximal humerus"    Daily Treatment Diary     Manual              AAROM Right shoulder all planes                                                                     Exercise Diary  12 6            UBE             Pulleys             Back rolls/ scap retraction 20x            Supine serratus/ tricep             Supine AROM flexion/ abd to 90             S/l abd/ ER             Prone jodee 1-5                          BOR/ tricep kickbacks                          TB NO monies GTB x 20            TB  Lpd, rows, ER                                                                                                                     Kinesiotape bilateral shoulders  "Y", AC depress, retraction db                Modalities

## 2018-12-11 ENCOUNTER — OFFICE VISIT (OUTPATIENT)
Dept: PHYSICAL THERAPY | Facility: CLINIC | Age: 43
End: 2018-12-11
Payer: COMMERCIAL

## 2018-12-11 DIAGNOSIS — M25.811 CLICKING RIGHT SHOULDER: Primary | ICD-10-CM

## 2018-12-11 DIAGNOSIS — M25.311 SHOULDER JOINT INSTABILITY, RIGHT: ICD-10-CM

## 2018-12-11 PROCEDURE — 97110 THERAPEUTIC EXERCISES: CPT

## 2018-12-11 PROCEDURE — 97112 NEUROMUSCULAR REEDUCATION: CPT

## 2018-12-11 PROCEDURE — 97140 MANUAL THERAPY 1/> REGIONS: CPT

## 2018-12-11 NOTE — PROGRESS NOTES
Daily Note     Today's date: 2018  Patient name: José Luis Finley  : 1975  MRN: [id number]  Referring provider: Pedro Kinney DO  Dx:   Encounter Diagnosis     ICD-10-CM    1  Clicking right shoulder M25 811    2  Shoulder joint instability, right M25 311                   Subjective: Patient's chief complaint is audible clicking f/b pronounced pain of right shoulder with random movements, eq  elevating arm to don shirt  Objective: See treatment diary below  Manual                       AAROM Right shoulder all planes LA  PTA                                                                                                                           Exercise Diary                     UBE   Alt 6'                   Pulleys                       Back rolls/ scap retraction 20x 20x each                   Supine serratus/ tricep   B 4#  20x each                   Supine AROM flexion/ abd to 90   20x each                   S/l abd/ ER   B  4#  20x each                   Prone hughtsons 1-5   B  10x each                                           BOR/ tricep kickbacks   5#  20x each                                           TB NO monies GTB x 20 GTB  20x                   TB B  Lpd, rows, ER   BlueTB  Lpd/row  GTB  ER  20x each                                                                                                                                                                                                                   Kinesiotape bilateral shoulders  "Y", AC depress, retraction db                          Modalities                                                                                                          Assessment: Tolerated treatment without reported exacerbation of pain symptoms or limitations noted in exercise performance  Patient exhibited good technique with therapeutic exercises; encouraged compliance to tolerance of HEP        Plan: Continue per plan of care

## 2018-12-13 ENCOUNTER — OFFICE VISIT (OUTPATIENT)
Dept: PHYSICAL THERAPY | Facility: CLINIC | Age: 43
End: 2018-12-13
Payer: COMMERCIAL

## 2018-12-13 DIAGNOSIS — M25.311 SHOULDER JOINT INSTABILITY, RIGHT: ICD-10-CM

## 2018-12-13 DIAGNOSIS — M25.811 CLICKING RIGHT SHOULDER: Primary | ICD-10-CM

## 2018-12-13 PROCEDURE — 97112 NEUROMUSCULAR REEDUCATION: CPT

## 2018-12-13 PROCEDURE — 97140 MANUAL THERAPY 1/> REGIONS: CPT

## 2018-12-13 PROCEDURE — 97110 THERAPEUTIC EXERCISES: CPT

## 2018-12-13 NOTE — PROGRESS NOTES
Daily Note     Today's date: 2018  Patient name: Malina Barber  : 1975  MRN: [id number]  Referring provider: Venus Gamez DO  Dx:   Encounter Diagnosis     ICD-10-CM    1  Clicking right shoulder M25 811    2  Shoulder joint instability, right M25 311                   Subjective: Upon presentation, SPR=0/10  Patient noted moderate muscular soreness post HEP compliance yesterday; patient denied typical symptom pain though        Objective: See treatment diary below  Manual                     AAROM Right shoulder all planes LA  PTA LA  PTA                                                                                                                         Exercise Diary                   UBE   Alt 6' Alt 8'                 Pulleys     :10  5'                 Back rolls/ scap retraction 20x 20x each 20x each                 Supine serratus/ tricep   B 4#  20x each B  5#  20x each                 Supine AROM flexion/ abd to 90   20x each 20x each                 S/l abd/ ER   B  4#  20x each B  4#  20x  each                 Prone hughtsons 1-5   B  10x each B  10x  each                                         BOR/ tricep kickbacks   5#  20x each 5#  20x each                                         TB NO monies GTB x 20 GTB  20x GTB  20x each                 TB B  Lpd, rows, ER   BlueTB  Lpd/row  GTB  ER  20x each BTB  LPD/ROW  GTB  ER  20x each                 IR stretch with strap     :30  5x                 Sleeper stretch     :10  10                                                                                                                                                                 Kinesiotape bilateral shoulders  "Y", AC depress, retraction db   DB                       Modalities                                                                                                       Assessment: Tolerated treatment without restrictions and reported significant stretch and improved AROM of IR post active stretching  Patient exhibited good technique with therapeutic exercises      Plan: Continue per plan of care

## 2018-12-18 ENCOUNTER — OFFICE VISIT (OUTPATIENT)
Dept: PHYSICAL THERAPY | Facility: CLINIC | Age: 43
End: 2018-12-18
Payer: COMMERCIAL

## 2018-12-18 DIAGNOSIS — M25.811 CLICKING RIGHT SHOULDER: ICD-10-CM

## 2018-12-18 DIAGNOSIS — M25.311 SHOULDER JOINT INSTABILITY, RIGHT: Primary | ICD-10-CM

## 2018-12-18 PROCEDURE — 97112 NEUROMUSCULAR REEDUCATION: CPT | Performed by: PHYSICAL THERAPIST

## 2018-12-18 PROCEDURE — 97140 MANUAL THERAPY 1/> REGIONS: CPT | Performed by: PHYSICAL THERAPIST

## 2018-12-18 PROCEDURE — 97110 THERAPEUTIC EXERCISES: CPT | Performed by: PHYSICAL THERAPIST

## 2018-12-18 NOTE — PROGRESS NOTES
Daily Note     Today's date: 2018  Patient name: Asia Oliveira  : 1975  MRN: [id number]  Referring provider: Lillian Webber DO  Dx:   Encounter Diagnosis     ICD-10-CM    1  Shoulder joint instability, right M25 311    2  Clicking right shoulder M25 811        Start Time: 1048  Stop Time: 1154  Total time in clinic (min): 66 minutes    Subjective: Patient reports that he is improving - notes a "catch" particularly on right mid-range       Objective: See treatment diary below  Manual                   AAROM Right shoulder all planes LA  PTA LA  PTA  bilat  db                                                                                                                       Exercise Diary                 UBE   Alt 6' Alt 8'  10'               Pulleys     :10  5'  5'               Back rolls/ scap retraction 20x 20x each 20x each  5# x 20                Supine serratus/ tricep   B 4#  20x each B  5#  20x each  5# x 20                Supine AROM flexion/ abd to 90   20x each 20x each  flex 5# abd 0# x 20 ea               S/l abd/ ER   B  4#  20x each B  4#  20x  each  5# x 20                Prone hughtsons 1-5   B  10x each B  10x  each  0-2# 15 x 2                                        BOR/ tricep kickbacks   5#  20x each 5#  20x each  5# x 20                                        TB NO monies GTB x 20 GTB  20x GTB  20x each  gtb x 30               TB B  Lpd, rows, ER   BlueTB  Lpd/row  GTB  ER  20x each BTB  LPD/ROW  GTB  ER  20x each  gtb x 30                IR stretch with strap     :30  5x  20s x 3               Sleeper stretch     :10  10                  pect stretch       20s x 5                                                                                                                                       Kinesiotape bilateral shoulders  "Y", AC depress, retraction db   DB  db                     Modalities                                                                                                       Assessment: tolerated session fair overall   Good response with kinesiotape  - improving postural awareness and self correction observed  Plan: Continue per plan of care  Progress scapular strengthening as able

## 2018-12-20 ENCOUNTER — OFFICE VISIT (OUTPATIENT)
Dept: PHYSICAL THERAPY | Facility: CLINIC | Age: 43
End: 2018-12-20
Payer: COMMERCIAL

## 2018-12-20 DIAGNOSIS — M25.311 SHOULDER JOINT INSTABILITY, RIGHT: Primary | ICD-10-CM

## 2018-12-20 DIAGNOSIS — M25.811 CLICKING RIGHT SHOULDER: ICD-10-CM

## 2018-12-20 PROCEDURE — G8990 OTHER PT/OT CURRENT STATUS: HCPCS

## 2018-12-20 PROCEDURE — 97112 NEUROMUSCULAR REEDUCATION: CPT

## 2018-12-20 PROCEDURE — G8991 OTHER PT/OT GOAL STATUS: HCPCS

## 2018-12-20 PROCEDURE — 97110 THERAPEUTIC EXERCISES: CPT

## 2018-12-20 PROCEDURE — 97140 MANUAL THERAPY 1/> REGIONS: CPT

## 2018-12-20 NOTE — PROGRESS NOTES
Daily Note     Today's date: 2018  Patient name: Malina Barber  : 1975  MRN: [id number]  Referring provider: Venus Gamez DO  Dx:   Encounter Diagnosis     ICD-10-CM    1  Shoulder joint instability, right M25 311    2  Clicking right shoulder M25 811                   Subjective: SPR of left shoulder 6-7/10 with abduction at 80 degrees of elevation    APR of right shoulder =3/10 at worst "like a pinch"    Objective: See treatment diary below    Manual                 AAROM Right shoulder all planes LA  PTA LA  PTA  bilat  db B  LA  PTA                                                                                                                     Exercise Diary               UBE   Alt 6' Alt 8'  10' L3  10'                Pulleys     :10  5'  5' 5'             Back rolls/ scap retraction 20x 20x each 20x each  5# x 20  5#  20x             Supine serratus/ tricep   B 4#  20x each B  5#  20x each  5# x 20  5#  20x each             Supine AROM flexion/ abd to 90   20x each 20x each  flex 5# abd 0# x 20 ea 5# flex  0# abd  20x each             S/l abd/ ER   B  4#  20x each B  4#  20x  each  5# x 20  5#  20x each             Prone hughtsons 1-5   B  10x each B  10x  each  0-2# 15 x 2  2#  20x each                                     BOR/ tricep kickbacks   5#  20x each 5#  20x each  5# x 20  5#  20x each                                     TB NO monies GTB x 20 GTB  20x GTB  20x each  gtb x 30 GTB  30x             TB B  Lpd, rows, ER   BlueTB  Lpd/row  GTB  ER  20x each BTB  LPD/ROW  GTB  ER  20x each  gtb x 30  GTB  30x             IR stretch with strap     :30  5x  20s x 3 :20  3x             Sleeper stretch     :10  10   :10  10x              pect stretch       20s x 5 :20  5x                                                                                                                                     Kinesiotape bilateral shoulders  "Y", AC depress, retraction db   DB  db DB                   Modalities                                                                                                     Assessment: Tolerated treatment with reports of pain briefly at end ROM of flexion and IR  Maria Teresa Chapman Patient exhibited good technique with therapeutic exercises      Plan: Continue per plan of care

## 2018-12-24 ENCOUNTER — HOSPITAL ENCOUNTER (OUTPATIENT)
Dept: MRI IMAGING | Facility: HOSPITAL | Age: 43
Discharge: HOME | End: 2018-12-24
Attending: FAMILY MEDICINE
Payer: COMMERCIAL

## 2018-12-24 DIAGNOSIS — G89.29 CHRONIC LEFT SHOULDER PAIN: ICD-10-CM

## 2018-12-24 DIAGNOSIS — M89.9 BONE LESION: ICD-10-CM

## 2018-12-24 DIAGNOSIS — M25.512 CHRONIC LEFT SHOULDER PAIN: ICD-10-CM

## 2018-12-24 DIAGNOSIS — M19.012 GLENOHUMERAL ARTHRITIS, LEFT: ICD-10-CM

## 2018-12-24 PROCEDURE — 73221 MRI JOINT UPR EXTREM W/O DYE: CPT

## 2018-12-27 ENCOUNTER — OFFICE VISIT (OUTPATIENT)
Dept: PHYSICAL THERAPY | Facility: CLINIC | Age: 43
End: 2018-12-27
Payer: COMMERCIAL

## 2018-12-27 ENCOUNTER — OFFICE VISIT (OUTPATIENT)
Dept: OBGYN CLINIC | Facility: CLINIC | Age: 43
End: 2018-12-27
Payer: COMMERCIAL

## 2018-12-27 VITALS
WEIGHT: 251 LBS | SYSTOLIC BLOOD PRESSURE: 146 MMHG | HEART RATE: 71 BPM | DIASTOLIC BLOOD PRESSURE: 82 MMHG | HEIGHT: 75 IN | BODY MASS INDEX: 31.21 KG/M2

## 2018-12-27 DIAGNOSIS — D16.02 ENCHONDROMA OF LEFT HUMERUS: Primary | ICD-10-CM

## 2018-12-27 DIAGNOSIS — M25.612 SHOULDER JOINT STIFFNESS, BILATERAL: ICD-10-CM

## 2018-12-27 DIAGNOSIS — M75.101 ROTATOR CUFF SYNDROME OF BOTH SHOULDERS: ICD-10-CM

## 2018-12-27 DIAGNOSIS — M25.811 CLICKING RIGHT SHOULDER: ICD-10-CM

## 2018-12-27 DIAGNOSIS — M19.011 GLENOHUMERAL ARTHRITIS, RIGHT: ICD-10-CM

## 2018-12-27 DIAGNOSIS — M75.22 BICEPS TENDINITIS OF LEFT SHOULDER: ICD-10-CM

## 2018-12-27 DIAGNOSIS — M19.012 GLENOHUMERAL ARTHRITIS, LEFT: ICD-10-CM

## 2018-12-27 DIAGNOSIS — M25.611 SHOULDER JOINT STIFFNESS, BILATERAL: ICD-10-CM

## 2018-12-27 DIAGNOSIS — M67.814 TENDINOSIS OF LEFT SHOULDER: ICD-10-CM

## 2018-12-27 DIAGNOSIS — M25.311 SHOULDER JOINT INSTABILITY, RIGHT: Primary | ICD-10-CM

## 2018-12-27 DIAGNOSIS — M75.102 ROTATOR CUFF SYNDROME OF BOTH SHOULDERS: ICD-10-CM

## 2018-12-27 PROCEDURE — 97110 THERAPEUTIC EXERCISES: CPT

## 2018-12-27 PROCEDURE — 97140 MANUAL THERAPY 1/> REGIONS: CPT

## 2018-12-27 PROCEDURE — 99213 OFFICE O/P EST LOW 20 MIN: CPT | Performed by: FAMILY MEDICINE

## 2018-12-27 PROCEDURE — 97112 NEUROMUSCULAR REEDUCATION: CPT

## 2018-12-27 RX ORDER — AMOXICILLIN 500 MG/1
CAPSULE ORAL
Refills: 1 | COMMUNITY
Start: 2018-12-22 | End: 2019-02-07

## 2018-12-27 NOTE — PROGRESS NOTES
Daily Note     Today's date: 2018  Patient name: José Luis Finley  : 1975  MRN: [id number]  Referring provider: Pedro Kinney DO  Dx:   Encounter Diagnosis     ICD-10-CM    1  Shoulder joint instability, right M25 311    2  Clicking right shoulder M25 811                   Subjective: Upon presentation, SPR of B shoulder =0/10; patient's chief complaint is fatigue  Patient noted with manual stretching SPR increased to 10/10, which he reported quickly subsided post stretching; patient  reported improved AROM and decreased pain post manuals  Patient to Dr Mark Aleman who advised continued therapeutic intervention for 6 weeks; f/u         Objective: See treatment diary below    Manual               AAROM Right shoulder all planes LA  PTA LA  PTA  bilat  db B  LA  PTA B shld  LA  PTA                                                                                                                    Exercise Diary             UBE   Alt 6' Alt 8'  10' L3  10'    L4  10'           Pulleys     :10  5'  5' 5' 5'           Back rolls/ scap retraction 20x 20x each 20x each  5# x 20  5#  20x 5#  20x  No shld rolls           Supine serratus/ tricep   B 4#  20x each B  5#  20x each  5# x 20  5#  20x each 5#  30x each           Supine AROM flexion/ abd to 90   20x each 20x each  flex 5# abd 0# x 20 ea 5# flex  0# abd  20x each 5#  flexion  0#  abd  20x each           S/l abd/ ER   B  4#  20x each B  4#  20x  each  5# x 20  5#  20x each 5#  30x each            Prone hughtsons 1-5   B  10x each B  10x  each  0-2# 15 x 2  2#  20x each 2#  20x each                                   BOR/ tricep kickbacks   5#  20x each 5#  20x each  5# x 20  5#  20x each 5#  20x each                                   TB NO monies GTB x 20 GTB  20x GTB  20x each  gtb x 30 GTB  30x GTB  30x           TB B  Lpd, rows, ER   BlueTB  Lpd/row  GTB  ER  20x each BTB  LPD/ROW  GTB  ER  20x each  gtb x 30  GTB  30x GTB  30x           IR stretch with strap     :30  5x  20s x 3 :20  3x :20  3x           Sleeper stretch     :10  10   :10  10x :10  10x            pect stretch       20s x 5 :20  5x :20  5x           Medi ball circles:  CW/CCW           20x each                                                                                                            Kinesiotape bilateral shoulders  "Y", AC depress, retraction db   DB  db DB DB/LA                 Modalities                                                                                                     Assessment: Tolerated treatment very well without reports of exacerbation of pain or limitations in exercise performance with progression  Patient demonstrated fatigue post treatment      Plan: Continue per plan of care    Progress plan to tolerance

## 2018-12-27 NOTE — PROGRESS NOTES
Assessment/Plan:  Assessment/Plan   Diagnoses and all orders for this visit:    Enchondroma of left humerus    Glenohumeral arthritis, left  -     Ambulatory referral to Physical Therapy; Future    Biceps tendinitis of left shoulder  -     Ambulatory referral to Physical Therapy; Future    Tendinosis of left shoulder  -     Ambulatory referral to Physical Therapy; Future    Rotator cuff syndrome of both shoulders  -     Ambulatory referral to Physical Therapy; Future    Shoulder joint stiffness, bilateral  -     Ambulatory referral to Physical Therapy; Future    Glenohumeral arthritis, right  -     Ambulatory referral to Physical Therapy; Future    Other orders  -     amoxicillin (AMOXIL) 500 mg capsule; TAKE TWO CAPSULES RIGHT AWAY THEN ONE CAPSULE EVERY 6 HOURS UNTIL GONE      53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  Discussed patient physical exam, MRI findings, impression and plan  MRI of the left shoulder is noted for proximal humerus enchondroma, significant glenohumeral arthritis, and supraspinatus and infraspinatus tendinosis, and biceps tendon tenosynovitis  Range of motion of both shoulders noted to be forward flexion of 170° and abduction to 170°, with internal rotation of right shoulder to lumbar spine and internal rotation of left shoulder the sacrum  He has normal strength  He states that with his current regimen he has noticed improvement, and discussed with him option of oral prednisone to help with inflammation to which he declined at this time  I recommend that he continue with physical therapy and home exercises  He will follow up as needed      Subjective:   Patient ID: Malina Barber is a 37 y o  male  Chief Complaint   Patient presents with    Left Shoulder - Follow-up       53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 more than 1 year duration    He was last seen 3 weeks ago which point her for MRI of the left shoulder due to finding of bony lesion of proximal humerus on x-ray  Today reports that he has been continue with physical therapy and home exercises and has noted improvement in range of motion of both shoulders and also decreased pain  He also may some changes in his diet to include more tumeric and ashwaganda  He has pain that is described as localized mainly to the anterior lateral aspect of both shoulders, achy and sometimes sharp, nonradiating, worse with elevating the arm, and improved with return to neutral position  Shoulder Pain   This is a chronic problem  The current episode started more than 1 year ago  The problem occurs constantly  The problem has been gradually improving  Associated symptoms include arthralgias  Pertinent negatives include no joint swelling, numbness or weakness  Exacerbated by: Arm use  He has tried rest (Physical therapy, supplements) for the symptoms  The treatment provided moderate relief  Review of Systems   Musculoskeletal: Positive for arthralgias  Negative for joint swelling  Neurological: Negative for weakness and numbness  Objective:  Vitals:    12/27/18 0837   BP: 146/82   Pulse: 71   Weight: 114 kg (251 lb)   Height: 6' 3" (1 905 m)     Right Shoulder Exam     Range of Motion   Active Abduction: 170   Forward Flexion: 170   Internal Rotation 0 degrees: Lumbar     Muscle Strength   The patient has normal right shoulder strength  Left Shoulder Exam     Range of Motion   Active Abduction: 170   Forward Flexion: 170   Internal Rotation 0 degrees: Sacrum     Muscle Strength   The patient has normal left shoulder strength  Physical Exam   Constitutional: He is oriented to person, place, and time  He appears well-developed  No distress  HENT:   Head: Normocephalic and atraumatic  Eyes: Conjunctivae are normal    Neck: No tracheal deviation present  Cardiovascular: Normal rate  Pulmonary/Chest: Effort normal  No respiratory distress  Abdominal: He exhibits no distension  Neurological: He is alert and oriented to person, place, and time  Skin: Skin is warm and dry  Psychiatric: He has a normal mood and affect  His behavior is normal    Nursing note and vitals reviewed  I have personally reviewed pertinent films in PACS and my interpretation is Infraspinatus and supraspinatus tendinosis, glenohumeral joint arthritis

## 2018-12-28 ENCOUNTER — TELEPHONE (OUTPATIENT)
Dept: OBGYN CLINIC | Facility: HOSPITAL | Age: 43
End: 2018-12-28

## 2018-12-28 NOTE — TELEPHONE ENCOUNTER
Patient sees Dr Mark Aleman for his left shoulder  He's been thinking about going to a chiropractor and would like your opinion on this      Patient callback #[phone]

## 2019-01-03 ENCOUNTER — OFFICE VISIT (OUTPATIENT)
Dept: PHYSICAL THERAPY | Facility: CLINIC | Age: 44
End: 2019-01-03
Payer: COMMERCIAL

## 2019-01-03 DIAGNOSIS — M25.311 SHOULDER JOINT INSTABILITY, RIGHT: Primary | ICD-10-CM

## 2019-01-03 DIAGNOSIS — M25.811 CLICKING RIGHT SHOULDER: ICD-10-CM

## 2019-01-03 PROCEDURE — 97110 THERAPEUTIC EXERCISES: CPT | Performed by: PHYSICAL THERAPIST

## 2019-01-03 PROCEDURE — 97112 NEUROMUSCULAR REEDUCATION: CPT | Performed by: PHYSICAL THERAPIST

## 2019-01-03 PROCEDURE — 97140 MANUAL THERAPY 1/> REGIONS: CPT | Performed by: PHYSICAL THERAPIST

## 2019-01-03 NOTE — PROGRESS NOTES
Daily Note     Today's date: 1/3/2019  Patient name: Rika Field  : 1975  MRN: [id number]  Referring provider: Maryam Keyes DO  Dx:   Encounter Diagnosis     ICD-10-CM    1  Shoulder joint instability, right M25 311    2  Clicking right shoulder M25 811                   Subjective: Patient reports that he is doing better - but has trouble sleeping on either side secondary to achiness that is produced in shoulders  Reports that left arm "feels good" after trial IASTM today       Objective: See treatment diary below    Manual  12/11 12/13  12 / 18 12/20 12/27  1/3           AAROM Right shoulder all planes LA  PTA LA  PTA  bilat  db B  LA  PTA B shld  LA  PTA   db bilat                                                                                                                 Exercise Diary  12 6 12/11 12/13  12  18 12/20 12/27 1/3         UBE   Alt 6' Alt 8'  10' L3  10'    L4  10'  L4  10'         Pulleys     :10  5'  5' 5' 5'  dc         Back rolls/ scap retraction 20x 20x each 20x each  5# x 20  5#  20x 5#  20x  No shld rolls  5# 20x retrac only         Supine serratus/ tricep   B 4#  20x each B  5#  20x each  5# x 20  5#  20x each 5#  30x each  10# x 30          Supine AROM flexion/ abd to 90   20x each 20x each  flex 5# abd 0# x 20 ea 5# flex  0# abd  20x each 5#  flexion  0#  abd  20x each  10# flex 1#  X 20 ea         S/l abd/ ER   B  4#  20x each B  4#  20x  each  5# x 20  5#  20x each 5#  30x each   5# x 20         Prone hughtsons 1-5   B  10x each B  10x  each  0-2# 15 x 2  2#  20x each 2#  20x each  3-5# x 20 ea                                 BOR/ tricep kickbacks   5#  20x each 5#  20x each  5# x 20  5#  20x each 5#  20x each  10# x 20 ea                                 TB NO monies GTB x 20 GTB  20x GTB  20x each  gtb x 30 GTB  30x GTB  30x  gtb x 30         TB B  Lpd, rows, ER   BlueTB  Lpd/row  GTB  ER  20x each BTB  LPD/ROW  GTB  ER  20x each  gtb x 30  GTB  30x GTB  30x  btb x 30         IR stretch with strap     :30  5x  20s x 3 :20  3x :20  3x  20s x 5         Sleeper stretch     :10  10   :10  10x :10  10x            pect stretch       20s x 5 :20  5x :20  5x           Medi ball circles:  CW/CCW           20x each             T roll stretch  "T"             20s x 5                                                                                 Kinesiotape bilateral shoulders  "Y", AC depress, retraction db   DB  db DB DB/LA  db               Modalities   1/3                      iASTM   Left shoulder/ biceps/lateral arm 8'                                                                          Assessment: Tolerated treatment well - (+) restrictions felt with trial of IASTM -  Monitor patient longer term response next session  Increased weight with some activities without issue  Plan: Continue per plan of care    Progress plan to tolerance

## 2019-01-08 ENCOUNTER — APPOINTMENT (OUTPATIENT)
Dept: PHYSICAL THERAPY | Facility: CLINIC | Age: 44
End: 2019-01-08
Payer: COMMERCIAL

## 2019-01-10 ENCOUNTER — OFFICE VISIT (OUTPATIENT)
Dept: PHYSICAL THERAPY | Facility: CLINIC | Age: 44
End: 2019-01-10
Payer: COMMERCIAL

## 2019-01-10 DIAGNOSIS — M25.811 CLICKING RIGHT SHOULDER: Primary | ICD-10-CM

## 2019-01-10 DIAGNOSIS — M25.311 SHOULDER JOINT INSTABILITY, RIGHT: ICD-10-CM

## 2019-01-10 PROCEDURE — 97530 THERAPEUTIC ACTIVITIES: CPT | Performed by: PHYSICAL THERAPIST

## 2019-01-10 PROCEDURE — 97140 MANUAL THERAPY 1/> REGIONS: CPT | Performed by: PHYSICAL THERAPIST

## 2019-01-10 PROCEDURE — 97110 THERAPEUTIC EXERCISES: CPT | Performed by: PHYSICAL THERAPIST

## 2019-01-10 NOTE — PROGRESS NOTES
Daily Note     Today's date: 1/10/2019  Patient name: Rika Field  : 1975  MRN: [id number]  Referring provider: Maryam Keyes DO  Dx:   Encounter Diagnosis     ICD-10-CM    1  Clicking right shoulder M25 811    2  Shoulder joint instability, right M25 311                   Subjective: Patient reports that he was adjusted a couple times and had deep soft tissue work performed and is feeling "much better" today      Notes that L > R shoulder continues to "catch" end range flexion/ ER    Objective: See treatment diary below    Manual  12/11 12/13  12 / 18 12/20 12/27  1/3  1/ 10         AAROM Right shoulder all planes LA  PTA LA  PTA  bilat  db B  LA  PTA B shld  LA  PTA   db bilat  db                                                                                                               Exercise Diary  12 6 12/11 12/13  12  18 12/20 12/27 1/3  1/ 10       UBE   Alt 6' Alt 8'  10' L3  10'    L4  10'  L4  10'  10'       Pulleys     :10  5'  5' 5' 5'  dc         Back rolls/ scap retraction 20x 20x each 20x each  5# x 20  5#  20x 5#  20x  No shld rolls  5# 20x retrac only  10# x 20       Supine serratus/ tricep   B 4#  20x each B  5#  20x each  5# x 20  5#  20x each 5#  30x each  10# x 30   10# R 5# L x 30       Supine AROM flexion/ abd to 90   20x each 20x each  flex 5# abd 0# x 20 ea 5# flex  0# abd  20x each 5#  flexion  0#  abd  20x each  10# flex 1#  X 20 ea 5# X 20 ea       S/l abd/ ER   B  4#  20x each B  4#  20x  each  5# x 20  5#  20x each 5#  30x each   5# x 20  3/5# x 20        Prone hughtsons 1-5   B  10x each B  10x  each  0-2# 15 x 2  2#  20x each 2#  20x each  3-5# x 20 ea  5# x 20                               BOR/ tricep kickbacks   5#  20x each 5#  20x each  5# x 20  5#  20x each 5#  20x each  10# x 20 ea  10# x 20                               TB NO monies GTB x 20 GTB  20x GTB  20x each  gtb x 30 GTB  30x GTB  30x  gtb x 30  gtb x 30       TB B  Lpd, rows, ER   BlueTB  Lpd/row  GTB  ER  20x each BTB  LPD/ROW  GTB  ER  20x each  gtb x 30  GTB  30x GTB  30x  btb x 30  blk x 30       IR stretch with strap     :30  5x  20s x 3 :20  3x :20  3x  20s x 5  20s x 5       Sleeper stretch     :10  10   :10  10x :10  10x            pect stretch       20s x 5 :20  5x :20  5x           Medi ball circles:  CW/CCW           20x each             T roll stretch  "T"             20s x 5  20s x 5                                                                                Kinesiotape bilateral shoulders  "Y", AC depress, retraction db   DB  db DB DB/LA  db               Modalities   1/3  1/ 10                    iASTM   Left shoulder/ biceps/lateral arm 8'  9'                                                                        Assessment: Tolerated treatment well - less proximal restrictions with IASTM - worked more distally today - elbow with (+) restrictions noted  Plan: Continue per plan of care  Progress plan to tolerance   Progress upright tasks next session

## 2019-01-15 ENCOUNTER — OFFICE VISIT (OUTPATIENT)
Dept: PHYSICAL THERAPY | Facility: CLINIC | Age: 44
End: 2019-01-15
Payer: COMMERCIAL

## 2019-01-15 DIAGNOSIS — M25.811 CLICKING RIGHT SHOULDER: Primary | ICD-10-CM

## 2019-01-15 DIAGNOSIS — M25.311 SHOULDER JOINT INSTABILITY, RIGHT: ICD-10-CM

## 2019-01-15 PROCEDURE — 97140 MANUAL THERAPY 1/> REGIONS: CPT

## 2019-01-15 PROCEDURE — G8991 OTHER PT/OT GOAL STATUS: HCPCS

## 2019-01-15 PROCEDURE — G8990 OTHER PT/OT CURRENT STATUS: HCPCS

## 2019-01-15 PROCEDURE — 97530 THERAPEUTIC ACTIVITIES: CPT

## 2019-01-15 PROCEDURE — 97110 THERAPEUTIC EXERCISES: CPT

## 2019-01-15 NOTE — PROGRESS NOTES
Daily Note     Today's date: 1/15/2019  Patient name: Merilee Riedel  : 1975  MRN: [id number]  Referring provider: Faustina Chapman DO  Dx:   Encounter Diagnosis     ICD-10-CM    1  Clicking right shoulder M25 811    2  Shoulder joint instability, right M25 311                   Subjective: Patient noted slight right anterior aspect of right shoulder discomfort he suspects secondary to sleeping on shoulder  Patient noted left anterior shoulder discomfort with initiation of resistive exercises        Objective: See treatment diary below  Manual  12/11 12/13  12 / 18 12/20 12/27  1/3  1/ 10 1/15       AAROM Right shoulder all planes LA  PTA LA  PTA  bilat  db B  LA  PTA B shld  LA  PTA   db bilat  db LA  PTA                                                                                                             Exercise Diary  12 6 12/11 12/13  12  18 12/20 12/27 1/3  1/ 10 1/15     UBE   Alt 6' Alt 8'  10' L3  10'    L4  10'  L4  10'  10' L4 5  10'     Pulleys     :10  5'  5' 5' 5'  dc         Back rolls/ scap retraction 20x 20x each 20x each  5# x 20  5#  20x 5#  20x  No shld rolls  5# 20x retrac only  10# x 20 scap retraction only 10#  20x     Supine serratus/ tricep   B 4#  20x each B  5#  20x each  5# x 20  5#  20x each 5#  30x each  10# x 30   10# R 5# L x 30 10# R  5#L  20x each     Supine AROM flexion/ abd to 90   20x each 20x each  flex 5# abd 0# x 20 ea 5# flex  0# abd  20x each 5#  flexion  0#  abd  20x each  10# flex 1#  X 20 ea 5# X 20 ea 5#  20x  each     S/l abd/ ER   B  4#  20x each B  4#  20x  each  5# x 20  5#  20x each 5#  30x each   5# x 20  3/5# x 20  5#  20x each     Prone hughtsons 1-5   B  10x each B  10x  each  0-2# 15 x 2  2#  20x each 2#  20x each  3-5# x 20 ea  5# x 20 5#  20x each                             BOR/ tricep kickbacks   5#  20x each 5#  20x each  5# x 20  5#  20x each 5#  20x each  10# x 20 ea  10# x 20 10#  20x each                             TB NO monies GTB x 20 GTB  20x GTB  20x each  gtb x 30 GTB  30x GTB  30x  gtb x 30  gtb x 30 GTB  20x each     TB B  Lpd, rows, ER   BlueTB  Lpd/row  GTB  ER  20x each BTB  LPD/ROW  GTB  ER  20x each  gtb x 30  GTB  30x GTB  30x  btb x 30  blk x 30 Blk  30x each     IR stretch with strap     :30  5x  20s x 3 :20  3x :20  3x  20s x 5  20s x 5 :20  5x     Sleeper stretch     :10  10   :10  10x :10  10x            pect stretch       20s x 5 :20  5x :20  5x           Medi ball circles:  CW/CCW           20x each             T roll stretch  "T"             20s x 5  20s x 5  :20  5x                                                                             Kinesiotape bilateral shoulders  "Y", AC depress, retraction db   DB  db DB DB/LA  db   DB  PT           Modalities   1/3  1/ 10 1/15                  iASTM   Left shoulder/ biceps/lateral arm 8'  9' 10'                                                                        Assessment: Tolerated treatment well  Patient exhibited good technique with therapeutic exercises      Plan: Continue per plan of care

## 2019-01-17 ENCOUNTER — OFFICE VISIT (OUTPATIENT)
Dept: PHYSICAL THERAPY | Facility: CLINIC | Age: 44
End: 2019-01-17
Payer: COMMERCIAL

## 2019-01-17 DIAGNOSIS — M25.311 SHOULDER JOINT INSTABILITY, RIGHT: ICD-10-CM

## 2019-01-17 DIAGNOSIS — M25.811 CLICKING RIGHT SHOULDER: Primary | ICD-10-CM

## 2019-01-17 PROCEDURE — 97140 MANUAL THERAPY 1/> REGIONS: CPT | Performed by: PHYSICAL THERAPIST

## 2019-01-17 PROCEDURE — 97110 THERAPEUTIC EXERCISES: CPT | Performed by: PHYSICAL THERAPIST

## 2019-01-17 PROCEDURE — 97530 THERAPEUTIC ACTIVITIES: CPT | Performed by: PHYSICAL THERAPIST

## 2019-01-17 NOTE — PROGRESS NOTES
Daily Note     Today's date: 2019  Patient name: Shannan Calixto  : 1975  MRN: [id number]  Referring provider: Shabnam Duenas DO  Dx:   Encounter Diagnosis     ICD-10-CM    1  Clicking right shoulder M25 811    2  Shoulder joint instability, right M25 311                   Subjective: Patient reports that he overdid it quite a bit with karate last night and does have soreness this am    He notes that manual stretching is a little more uncomfortable today  Feels better after ice to end session       Objective: See treatment diary below  Manual  12/11 12/13  12 / 18 12/20 12/27  1/3  1/ 10 1/15  1/ 17     AAROM Right shoulder all planes LA  PTA LA  PTA  bilat  db B  LA  PTA B shld  LA  PTA   db bilat  db LA  PTA  db                                                                                                            Exercise Diary  12 6 12/11 12/13  12  18 12/20 12/27 1/3  1/ 10 1/15  1/ 17   UBE   Alt 6' Alt 8'  10' L3  10'    L4  10'  L4  10'  10' L4 5  10'  10'   Pulleys     :10  5'  5' 5' 5'  dc         cerv retract           2s x 20   Back rolls/ scap retraction 20x 20x each 20x each  5# x 20  5#  20x 5#  20x  No shld rolls  5# 20x retrac only  10# x 20 scap retraction only 10#  20x  10# x 20   Supine serratus/ tricep   B 4#  20x each B  5#  20x each  5# x 20  5#  20x each 5#  30x each  10# x 30   10# R 5# L x 30 10# R  5#L  20x each  10#R 5# L  X 20   Supine AROM flexion/ abd to 90   20x each 20x each  flex 5# abd 0# x 20 ea 5# flex  0# abd  20x each 5#  flexion  0#  abd  20x each  10# flex 1#  X 20 ea 5# X 20 ea 5#  20x  each  5-10# x 20 ea   S/l abd/ ER   B  4#  20x each B  4#  20x  each  5# x 20  5#  20x each 5#  30x each   5# x 20  3/5# x 20  5#  20x each  5-10# x 20   Prone hughtsons 1-5   B  10x each B  10x  each  0-2# 15 x 2  2#  20x each 2#  20x each  3-5# x 20 ea  5# x 20 5#  20x each  5# x 20                            BOR/ tricep kickbacks   5#  20x each 5#  20x each  5# x 20 5#  20x each 5#  20x each  10# x 20 ea  10# x 20 10#  20x each  10# x 20                           TB NO monies GTB x 20 GTB  20x GTB  20x each  gtb x 30 GTB  30x GTB  30x  gtb x 30  gtb x 30 GTB  20x each  gtb x 20   TB B  Lpd, rows, ER   BlueTB  Lpd/row  GTB  ER  20x each BTB  LPD/ROW  GTB  ER  20x each  gtb x 30  GTB  30x GTB  30x  btb x 30  blk x 30 Blk  30x each  blk x 30   IR stretch with strap     :30  5x  20s x 3 :20  3x :20  3x  20s x 5  20s x 5 :20  5x  20s x 5   Sleeper stretch     :10  10   :10  10x :10  10x            pect stretch       20s x 5 :20  5x :20  5x        20s x 5   Medi ball circles:  CW/CCW           20x each             T roll stretch  "T"             20s x 5  20s x 5  :20  5x  20s x 5    stand flex/scap/abd to 90                    3# x 20 ea    body blade 2/1 arms  3 positions                    30s x 3 ea                           Kinesiotape bilateral shoulders  "Y", AC depress, retraction db   DB  db DB DB/LA  db   DB  PT           Modalities   1/3  1/ 10 1/15  1/ 17                iASTM   Left shoulder/ biceps/lateral arm 8'  9' 10'                                           CP B shoulders        10'                     Assessment: Tolerated treatment fair today - more "pinching" today with end range stretching  Plan: Continue per plan of care  progress scap strengthening

## 2019-01-22 ENCOUNTER — OFFICE VISIT (OUTPATIENT)
Dept: PHYSICAL THERAPY | Facility: CLINIC | Age: 44
End: 2019-01-22
Payer: COMMERCIAL

## 2019-01-22 DIAGNOSIS — M25.811 CLICKING RIGHT SHOULDER: Primary | ICD-10-CM

## 2019-01-22 DIAGNOSIS — M25.311 SHOULDER JOINT INSTABILITY, RIGHT: ICD-10-CM

## 2019-01-22 PROCEDURE — 97140 MANUAL THERAPY 1/> REGIONS: CPT

## 2019-01-22 PROCEDURE — 97110 THERAPEUTIC EXERCISES: CPT

## 2019-01-22 PROCEDURE — 97530 THERAPEUTIC ACTIVITIES: CPT

## 2019-01-22 NOTE — PROGRESS NOTES
Daily Note     Today's date: 2019  Patient name: Hyacinth Knight  : 1975  MRN: [id number]  Referring provider: Jasiel Acuña DO  Dx:   Encounter Diagnosis     ICD-10-CM    1  Clicking right shoulder M25 811    2  Shoulder joint instability, right M25 311                   Subjective:   Patient denied aggravation of pain symptoms as noted last session post martial arts work out        Objective: See treatment diary below  Manual  12/11 12/13  12 / 18 12/20 12/27  1/3  1/ 10 1/15  1/ 17 1/22   AAROM Right shoulder all planes LA  PTA LA  PTA  bilat  db B  LA  PTA B shld  LA  PTA   db bilat  db LA  PTA  db  LA  PTA                                                                                                         Exercise Diary  1/22 12/11 12/13  12  18 12/20 12/27 1/3  1/ 10 1/15  1/ 17   UBE L4 5  10' Alt 6' Alt 8'  10' L3  10'    L4  10'  L4  10'  10' L4 5  10'  10'   Pulleys     :10  5'  5' 5' 5'  dc         cerv retract                    2s x 20   Back rolls/ scap retraction 10#  20x  each 20x each 20x each  5# x 20  5#  20x 5#  20x  No shld rolls  5# 20x retrac only  10# x 20 scap retraction only 10#  20x  10# x 20   Supine serratus/ tricep 10# R  5# L  20x each B 4#  20x each B  5#  20x each  5# x 20  5#  20x each 5#  30x each  10# x 30   10# R 5# L x 30 10# R  5#L  20x each  10#R 5# L  X 20   Supine AROM flexion/ abd to 90 5-10#  20x each 20x each 20x each  flex 5# abd 0# x 20 ea 5# flex  0# abd  20x each 5#  flexion  0#  abd  20x each  10# flex 1#  X 20 ea 5# X 20 ea 5#  20x  each  5-10# x 20 ea   S/l abd/ ER 5-10#  20x each B  4#  20x each B  4#  20x  each  5# x 20  5#  20x each 5#  30x each   5# x 20  3/5# x 20  5#  20x each  5-10# x 20   Prone hughtsons 1-5 5#  20x each B  10x each B  10x  each  0-2# 15 x 2  2#  20x each 2#  20x each  3-5# x 20 ea  5# x 20 5#  20x each  5# x 20                            BOR/ tricep kickbacks 10#  20x each 5#  20x each 5#  20x each  5# x 20  5#  20x each 5#  20x each  10# x 20 ea  10# x 20 10#  20x each  10# x 20                           TB NO monies Black  TB   20x GTB  20x GTB  20x each  gtb x 30 GTB  30x GTB  30x  gtb x 30  gtb x 30 GTB  20x each  gtb x 20   TB B  Lpd, rows, ER Black  TB  30x BlueTB  Lpd/row  GTB  ER  20x each BTB  LPD/ROW  GTB  ER  20x each  gtb x 30  GTB  30x GTB  30x  btb x 30  blk x 30 Blk  30x each  blk x 30   IR stretch with strap :20  5x   :30  5x  20s x 3 :20  3x :20  3x  20s x 5  20s x 5 :20  5x  20s x 5   Sleeper stretch     :10  10   :10  10x :10  10x            pect stretch :20  5x     20s x 5 :20  5x :20  5x        20s x 5   Medi ball circles:  CW/CCW           20x each             T roll stretch  "T" :20  5x           20s x 5  20s x 5  :20  5x  20s x 5    stand flex/scap/abd to 90 3#  20x each                  3# x 20 ea    body blade 2/1 arms  3 positions :30  3x each                  30s x 3 ea                           Kinesiotape bilateral shoulders  "Y", AC depress, retraction DB   DB  db DB DB/LA  db   DB  PT           Modalities   1/3  1/ 10 1/15  1/ 17 1/22              iASTM   Left shoulder/ biceps/lateral arm 8'  9' 10'    9'                                      CP B shoulders                 Assessment: Tolerated treatment well  Patient exhibited good technique with therapeutic exercises      Plan: Continue per plan of care

## 2019-01-24 ENCOUNTER — OFFICE VISIT (OUTPATIENT)
Dept: PHYSICAL THERAPY | Facility: CLINIC | Age: 44
End: 2019-01-24
Payer: COMMERCIAL

## 2019-01-24 DIAGNOSIS — M25.311 SHOULDER JOINT INSTABILITY, RIGHT: Primary | ICD-10-CM

## 2019-01-24 DIAGNOSIS — M25.811 CLICKING RIGHT SHOULDER: ICD-10-CM

## 2019-01-24 PROCEDURE — 97140 MANUAL THERAPY 1/> REGIONS: CPT | Performed by: PHYSICAL THERAPIST

## 2019-01-24 PROCEDURE — 97110 THERAPEUTIC EXERCISES: CPT | Performed by: PHYSICAL THERAPIST

## 2019-01-24 NOTE — PROGRESS NOTES
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    2  Clicking right shoulder M25 811                   Subjective:   Patient without new c/o today - pleased with progress overall  Notes that his Left shoulder is doing better this week         Objective: See treatment diary below  Manual  1/ 24 12/13  12 / 18 12/20 12/27  1/3  1/ 10 1/15  1/ 17 1/22   AAROM Right shoulder all planes db LA  PTA  bilat  db B  LA  PTA B shld  LA  PTA   db bilat  db LA  PTA  db  LA  PTA                                                                                                         Exercise Diary  1/22 1/24 12/13  12  18 12/20 12/27 1/3  1/ 10 1/15  1/ 17   UBE L4 5  10' Alt 10' Alt 8'  10' L3  10'    L4  10'  L4  10'  10' L4 5  10'  10'   Pulleys     :10  5'  5' 5' 5'  dc         cerv retract                    2s x 20   Back rolls/ scap retraction 10#  20x  each 20x each 20x each  5# x 20  5#  20x 5#  20x  No shld rolls  5# 20x retrac only  10# x 20 scap retraction only 10#  20x  10# x 20   Supine serratus/ tricep 10# R  5# L  20x each B 5#  20x each B  5#  20x each  5# x 20  5#  20x each 5#  30x each  10# x 30   10# R 5# L x 30 10# R  5#L  20x each  10#R 5# L  X 20   Supine AROM flexion/ abd to 90 5-10#  20x each 20x each 10# 20x each  flex 5# abd 0# x 20 ea 5# flex  0# abd  20x each 5#  flexion  0#  abd  20x each  10# flex 1#  X 20 ea 5# X 20 ea 5#  20x  each  5-10# x 20 ea   S/l abd/ ER 5-10#  20x each B  5-10#  20x each B  4#  20x  each  5# x 20  5#  20x each 5#  30x each   5# x 20  3/5# x 20  5#  20x each  5-10# x 20   Prone hughtsons 1-5 5#  20x each B  10x each B  10x  each  0-2# 15 x 2  2#  20x each 2#  20x each  3-5# x 20 ea  5# x 20 5#  20x each  5# x 20                            BOR/ tricep kickbacks 10#  20x each 5#  20x each 5#  20x each  5# x 20  5#  20x each 5#  20x each  10# x 20 ea  10# x 20 10#  20x each  10# x 20                           TB NO monies Black  TB   20x blk   20x GTB  20x each  gtb x 30 GTB  30x GTB  30x  gtb x 30  gtb x 30 GTB  20x each  gtb x 20   TB B  Lpd, rows, ER Black  TB  30x Blk   30x each BTB  LPD/ROW  GTB  ER  20x each  gtb x 30  GTB  30x GTB  30x  btb x 30  blk x 30 Blk  30x each  blk x 30   IR stretch with strap :20  5x  20s x 5 :30  5x  20s x 3 :20  3x :20  3x  20s x 5  20s x 5 :20  5x  20s x 5   Sleeper stretch     :10  10   :10  10x :10  10x            pect stretch :20  5x  on foam   20s x 5 :20  5x :20  5x        20s x 5   Medi ball circles:  CW/CCW           20x each             T roll stretch  "T" :20  5x  yes         20s x 5  20s x 5  :20  5x  20s x 5    stand flex/scap/abd to 90 3#  20x each                  3# x 20 ea    body blade 2/1 arms  3 positions :30  3x each                  30s x 3 ea                           Kinesiotape bilateral shoulders  "Y", AC depress, retraction DB   DB  db DB DB/LA  db   DB  PT           Modalities   1/3  1/ 10 1/15  1/ 17 1/22  1/ 24            iASTM   Left shoulder/ biceps/lateral arm 8'  9' 10'    9'  10'                                     CP B shoulders                 Assessment: Tolerated treatment well  All AA movements increasing bilateral shoulders  Plan: Continue per plan of care

## 2019-01-28 ENCOUNTER — OFFICE VISIT (OUTPATIENT)
Dept: PHYSICAL THERAPY | Facility: CLINIC | Age: 44
End: 2019-01-28
Payer: COMMERCIAL

## 2019-01-28 DIAGNOSIS — M25.811 CLICKING RIGHT SHOULDER: ICD-10-CM

## 2019-01-28 DIAGNOSIS — M25.311 SHOULDER JOINT INSTABILITY, RIGHT: Primary | ICD-10-CM

## 2019-01-28 PROCEDURE — 97110 THERAPEUTIC EXERCISES: CPT | Performed by: PHYSICAL THERAPIST

## 2019-01-28 PROCEDURE — 97140 MANUAL THERAPY 1/> REGIONS: CPT | Performed by: PHYSICAL THERAPIST

## 2019-01-28 NOTE — PROGRESS NOTES
Daily Note     Today's date: 2019  Patient name: Merilee Riedel  : 1975  MRN: [id number]  Referring provider: Faustina Chapman DO  Dx:   Encounter Diagnosis     ICD-10-CM    1  Shoulder joint instability, right M25 311    2  Clicking right shoulder M25 811             1:1  JK, PTA       Subjective:   Patient reports that his left shoulder is feeling much better - pleased with his progress overall  Notes that the chiropractor in conjunction with PT "is really helping"        Objective: See treatment diary below  Manual  1/ 24 1/ 28  12 / 18 12/20 12/27  1/3  1/ 10 1/15  1/ 17 1/22   AAROM Right shoulder all planes db db  bilat  db B  LA  PTA B shld  LA  PTA   db bilat  db LA  PTA  db  LA  PTA                                                                                                         Exercise Diary  1/22 1/24 1/ 28  12  18 12/20 12/27 1/3  1/ 10 1/15  1/ 17   UBE L4 5  10' Alt 10' Alt 10'  10' L3  10'    L4  10'  L4  10'  10' L4 5  10'  10'   Pulleys          dc         cerv retract                    2s x 20   Back rolls/ scap retraction 10#  20x  each 20x each 20x each  5# x 20  5#  20x 5#  20x  No shld rolls  5# 20x retrac only  10# x 20 scap retraction only 10#  20x  10# x 20   Supine serratus/ tricep 10# R  5# L  20x each B 5#  20x each B  5#  20x each  5# x 20  5#  20x each 5#  30x each  10# x 30   10# R 5# L x 30 10# R  5#L  20x each  10#R 5# L  X 20   Supine AROM flexion/ abd to 90 5-10#  20x each 20x each 10# 20x each  flex 5# abd 0# x 20 ea 5# flex  0# abd  20x each 5#  flexion  0#  abd  20x each  10# flex 1#  X 20 ea 5# X 20 ea 5#  20x  each  5-10# x 20 ea   S/l abd/ ER 5-10#  20x each B  5-10#  20x each B  5-10#  20x  each  5# x 20  5#  20x each 5#  30x each   5# x 20  3/5# x 20  5#  20x each  5-10# x 20   Prone hughtsons 1-5 5#  20x each B  10x each B  10x  each  0-2# 15 x 2  2#  20x each 2#  20x each  3-5# x 20 ea  5# x 20 5#  20x each  5# x 20                            BOR/ tricep kickbacks 10#  20x each 5#  20x each 5#  20x each  5# x 20  5#  20x each 5#  20x each  10# x 20 ea  10# x 20 10#  20x each  10# x 20                           TB NO monies Black  TB   20x blk   20x GTB  20x each  gtb x 30 GTB  30x GTB  30x  gtb x 30  gtb x 30 GTB  20x each  gtb x 20   TB B  Lpd, rows, ER Black  TB  30x Blk   30x each Blk  LPD/ROW  GTB  ER  20x each  gtb x 30  GTB  30x GTB  30x  btb x 30  blk x 30 Blk  30x each  blk x 30   IR stretch with strap :20  5x  20s x 5 :30  5x  20s x 3 :20  3x :20  3x  20s x 5  20s x 5 :20  5x  20s x 5   Sleeper stretch     :10  10   :10  10x :10  10x            pect stretch :20  5x  on foam  on foam 5# 20s x 5 20s x 5 :20  5x :20  5x        20s x 5   Medi ball circles:  CW/CCW           20x each             T roll stretch  "T" :20  5x  yes         20s x 5  20s x 5  :20  5x  20s x 5    stand flex/scap/abd to 90 3#  20x each    5# x 20 ea              3# x 20 ea    body blade 2/1 arms  3 positions :30  3x each    30s x 3              30s x 3 ea                           Kinesiotape bilateral shoulders  "Y", AC depress, retraction DB   DB  db DB DB/LA  db   DB  PT           Modalities   1/3  1/ 10 1/15  1/ 17 1/22  1/ 24            iASTM   Left shoulder/ biceps/lateral arm 8'  9' 10'    9'  10'                                     CP B shoulders                 Assessment: Tolerated treatment well  Patient is demonstrating improved mobility all planes today  Plan: Continue per plan of care

## 2019-01-29 ENCOUNTER — APPOINTMENT (OUTPATIENT)
Dept: PHYSICAL THERAPY | Facility: CLINIC | Age: 44
End: 2019-01-29
Payer: COMMERCIAL

## 2019-01-31 ENCOUNTER — OFFICE VISIT (OUTPATIENT)
Dept: PHYSICAL THERAPY | Facility: CLINIC | Age: 44
End: 2019-01-31
Payer: COMMERCIAL

## 2019-01-31 DIAGNOSIS — M25.811 CLICKING RIGHT SHOULDER: ICD-10-CM

## 2019-01-31 DIAGNOSIS — M25.311 SHOULDER JOINT INSTABILITY, RIGHT: Primary | ICD-10-CM

## 2019-01-31 PROCEDURE — 97110 THERAPEUTIC EXERCISES: CPT

## 2019-01-31 PROCEDURE — 97140 MANUAL THERAPY 1/> REGIONS: CPT

## 2019-02-05 ENCOUNTER — APPOINTMENT (OUTPATIENT)
Dept: PHYSICAL THERAPY | Facility: CLINIC | Age: 44
End: 2019-02-05
Payer: COMMERCIAL

## 2019-02-07 ENCOUNTER — OFFICE VISIT (OUTPATIENT)
Dept: PHYSICAL THERAPY | Facility: CLINIC | Age: 44
End: 2019-02-07
Payer: COMMERCIAL

## 2019-02-07 ENCOUNTER — OFFICE VISIT (OUTPATIENT)
Dept: OBGYN CLINIC | Facility: CLINIC | Age: 44
End: 2019-02-07
Payer: COMMERCIAL

## 2019-02-07 VITALS
BODY MASS INDEX: 29.84 KG/M2 | WEIGHT: 240 LBS | HEIGHT: 75 IN | DIASTOLIC BLOOD PRESSURE: 80 MMHG | SYSTOLIC BLOOD PRESSURE: 130 MMHG | HEART RATE: 55 BPM

## 2019-02-07 DIAGNOSIS — M25.811 CLICKING RIGHT SHOULDER: ICD-10-CM

## 2019-02-07 DIAGNOSIS — M75.101 ROTATOR CUFF SYNDROME OF BOTH SHOULDERS: ICD-10-CM

## 2019-02-07 DIAGNOSIS — M19.012 ARTHRITIS OF BOTH GLENOHUMERAL JOINTS: Primary | ICD-10-CM

## 2019-02-07 DIAGNOSIS — M19.011 ARTHRITIS OF BOTH GLENOHUMERAL JOINTS: Primary | ICD-10-CM

## 2019-02-07 DIAGNOSIS — M75.22 BICEPS TENDINITIS OF LEFT SHOULDER: ICD-10-CM

## 2019-02-07 DIAGNOSIS — M75.102 ROTATOR CUFF SYNDROME OF BOTH SHOULDERS: ICD-10-CM

## 2019-02-07 DIAGNOSIS — M25.311 SHOULDER JOINT INSTABILITY, RIGHT: Primary | ICD-10-CM

## 2019-02-07 DIAGNOSIS — M67.814 TENDINOSIS OF LEFT SHOULDER: ICD-10-CM

## 2019-02-07 PROCEDURE — 99213 OFFICE O/P EST LOW 20 MIN: CPT | Performed by: FAMILY MEDICINE

## 2019-02-07 PROCEDURE — 97110 THERAPEUTIC EXERCISES: CPT

## 2019-02-07 PROCEDURE — 97530 THERAPEUTIC ACTIVITIES: CPT

## 2019-02-07 PROCEDURE — 97140 MANUAL THERAPY 1/> REGIONS: CPT

## 2019-02-07 NOTE — PROGRESS NOTES
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    2   Clicking right shoulder M25 811                   Subjective:  "It feels great!"      Objective: See treatment diary below  Manual  1/ 24 1/ 28 1/31 2/7 12/27  1/3  1/ 10 1/15  1/ 17 1/22   AAROM Right shoulder all planes db db B   LA  PTA B  LA  PTA B shld  LA  PTA   db bilat  db LA  PTA  db  LA  PTA                                                                                                         Exercise Diary  1/22 1/24 1/ 28 1/31 2/7 12/27 1/3  1/ 10 1/15  1/ 17   UBE L4 5  10' Alt 10' Alt 10' L4 5  Alt  10' L4 5  10'    L4  10'  L4  10'  10' L4 5  10'  10'   Pulleys              dc         cerv retract                    2s x 20   Back rolls/ scap retraction 10#  20x  each 20x each 20x each 5#  20x  No rolls 5#  20x  No rolls 5#  20x  No shld rolls  5# 20x retrac only  10# x 20 scap retraction only 10#  20x  10# x 20   Supine serratus/ tricep 10# R  5# L  20x each B 5#  20x each B  5#  20x each 5#  20x each  5#  20x each 5#  30x each  10# x 30   10# R 5# L x 30 10# R  5#L  20x each  10#R 5# L  X 20   Supine AROM flexion/ abd to 90 5-10#  20x each 20x each 10# 20x each 10#  20x each 10#  20x each 5#  flexion  0#  abd  20x each  10# flex 1#  X 20 ea 5# X 20 ea 5#  20x  each  5-10# x 20 ea   S/l abd/ ER 5-10#  20x each B  5-10#  20x each B  5-10#  20x  each 10# 20x each  10#  20x each 5#  30x each   5# x 20  3/5# x 20  5#  20x each  5-10# x 20   Prone hughtsons 1-5 5#  20x each B  10x each B  10x  each 20x each 2#  20x each 2#  20x each  3-5# x 20 ea  5# x 20 5#  20x each  5# x 20                            BOR/ tricep kickbacks 10#  20x each 5#  20x each 5#  20x each 5#  20 x each 5#  20x each 5#  20x each  10# x 20 ea  10# x 20 10#  20x each  10# x 20                         TB NO monies Black  TB   20x blk   20x GTB  20x each Blk   20x  BlkTB  30x GTB  30x  gtb x 30  gtb x 30 GTB  20x each  gtb x 20   TB B  Lpd, rows, ER Black  TB  30x Blk   30x each Blk  LPD/ROW  GTB  ER  20x each blk 30x each Blk TB  30x GTB  30x  btb x 30  blk x 30 Blk  30x each  blk x 30   IR stretch with strap :20  5x  20s x 5 :30  5x :20 x 3x :20  3x :20  3x  20s x 5  20s x 5 :20  5x  20s x 5   Sleeper stretch     :10  10    :10  10x            pect stretch :20  5x  on foam  on foam 5# 20s x 5 On foam:20   5x :20  5x  On foam :20  5x        20s x 5   Medi ball circles:  CW/CCW           20x each             T roll stretch  "T" :20  5x  yes   5#  :20 5x 5#  20x    20s x 5  20s x 5  :20  5x  20s x 5    stand flex/scap/abd to 90 3#  20x each    5# x 20 ea 5#  20x each 5#  20x          3# x 20 ea    body blade 2/1 arms  3 positions :30  3x each    30s x 3 :30  3x each :30  3x each          30s x 3 ea                           Kinesiotape bilateral shoulders  "Y", AC depress, retraction DB   DB    DB/LA  db   DB  PT           Modalities   1/3  1/ 10 1/15  1/ 17 1/22  1/ 24 1/31          iASTM   Left shoulder/ biceps/lateral arm 8'  9' 10'    9'  10'  8'                                  CP B shoulders                 Assessment: Tolerated treatment well  Patient exhibited good technique with therapeutic exercises      Plan: Continue per plan of care

## 2019-02-07 NOTE — PROGRESS NOTES
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 both shoulders, of onset more than 1 year duration  Discussed with patient physical exam, impression, plan  He has significantly improved in his symptoms and physical exam is noted to be normal for range of motion and strength  I recommend he continue with his activities as tolerated and with his current regimen and he need only follow up with me on an as-needed basis            Subjective:   Patient ID: Gonzalo Martinez is a 37 y o  male  Chief Complaint   Patient presents with    Left Shoulder - Follow-up    Right Shoulder - Follow-up       40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 of more than 1 year duration  He was last seen nearly 6 weeks ago at which point MRI reviewed him is noted for enchondroma of left shoulder and he was advised to start physical therapy as soon as possible to address glenohumeral arthritis of both shoulders  Today reports significant improvement since last visit  He has been doing physical therapy and home exercise directed and also has been seen a chiropractor who uses various modalities including electrical stimulation  He reports improvement in pain as well as range of motion and being better able to tolerate his physical activity  He has resumed training for martial arts and has tailored his weightlifting activities to limit exacerbation of pain in the shoulders    He does have pain that is described as generalized to the shoulders, mild in intensity, achy, worse after repetitive activity, nonradiating, and improved with rest   He has been continue with a weight loss regimen and also supplements of tumeric and ashwaganda  He does have a martial arts competition this April in which he plans to participate  Shoulder Pain   This is a chronic problem  The current episode started more than 1 year ago  The problem occurs intermittently  The problem has been gradually improving  Associated symptoms include arthralgias  Pertinent negatives include no joint swelling, numbness or weakness  Exacerbated by: Repetitive activity  He has tried rest (Physical therapy) for the symptoms  The treatment provided significant relief  Review of Systems   Musculoskeletal: Positive for arthralgias  Negative for joint swelling  Neurological: Negative for weakness and numbness  Objective:  Vitals:    02/07/19 0931   BP: 130/80   Pulse: 55   Weight: 109 kg (240 lb)   Height: 6' 3" (1 905 m)     Right Shoulder Exam     Range of Motion   The patient has normal right shoulder ROM  Muscle Strength   The patient has normal right shoulder strength  Left Shoulder Exam     Range of Motion   The patient has normal left shoulder ROM  Muscle Strength   The patient has normal left shoulder strength  Physical Exam   Constitutional: He is oriented to person, place, and time  He appears well-developed  No distress  HENT:   Head: Normocephalic and atraumatic  Eyes: Conjunctivae are normal    Neck: No tracheal deviation present  Cardiovascular: Normal rate  Pulmonary/Chest: Effort normal  No respiratory distress  Abdominal: He exhibits no distension  Neurological: He is alert and oriented to person, place, and time  Skin: Skin is warm and dry  Psychiatric: He has a normal mood and affect  His behavior is normal    Nursing note and vitals reviewed

## 2019-02-28 NOTE — PROGRESS NOTES
PT Discharge    Today's date: 2019  Patient name: Alee Matos  : 1975  MRN: [id number]  Referring provider: Aldo Wynn DO  Dx:   Encounter Diagnosis     ICD-10-CM    1  Shoulder joint instability, right M25 311    2  Clicking right shoulder M25 811          Assessment  Assessment details: Patient was last seen 19 in our department  He did not attend further visits after that time, thus final measurements are not available  Patient however, was reporting that he was doing very good overall - painfree on last visit and pleased with his progress  He reports that he is participating in his karate without any difficulty  Understanding of Dx/Px/POC: good   Prognosis: good    Goals  STG   MET  1  Decrease pain by at least two subjective ratings in 4 weeks  2  Increase AROM by at least 25-50%  4 weeks   3  Improved postural awareness and self correction  4 weeks  LTG  MET  1  Independent with HEP  2   Reduced pain Right shoulder > 75% 6 weeks   3  Reduced clicking bilateral shoulder by 50% per patient account  6-8 weeks  Plan  Plan details: Discharge skilled PT at this time  THank you for this referral    Planned therapy interventions: home exercise program        Subjective Evaluation    Pain  Current pain ratin  At best pain ratin  At worst pain ratin  Progression: improved    Social Support  Lives with: spouse and young children    Employment status: working  Hand dominance: right    Patient Goals  Patient goals for therapy: decreased pain, increased strength, return to sport/leisure activities and increased motion  Patient goal: "To be able to use my shoulders fully"        Objective     Postural Observations  Seated posture: good  Standing posture: good  Correction of posture: makes symptoms better        Palpation     Right Shoulder Comments  Right biceps: (+) tenderness but the tenderness is felt "deep" in the joint   Right supraspinatus: deep pain with palpation on surface produced        Tenderness     Right Shoulder  No tenderness in the [facility] joint, acromion, biceps tendon (proximal) and supraspinatus tendon  Cervical/Thoracic Screen   Cervical range of motion within normal limits    Neurological Testing     Sensation     Shoulder   Left Shoulder   Intact: light touch    Right Shoulder   Intact: light touch    Active Range of Motion   Left Shoulder   Normal active range of motion    Right Shoulder   Normal active range of motion    Passive Range of Motion   Left Shoulder   Normal passive range of motion    Right Shoulder   Normal passive range of motion    Scapular Mobility   Left Shoulder   Scapular mobility: fair    Right Shoulder   Scapular mobility: fair    Strength/Myotome Testing     Left Shoulder   Normal muscle strength    Right Shoulder   Normal muscle strength    Tests     Right Shoulder   Negative apprehension, belly press, Hawkin's, Speed's and Yergason's             Precautions: R shoulder pain , Left shoulder pain - ( hold treatment on Left side pending MRI due to "suspicisous lesion proximal humerus"    Daily Treatment Diary

## 2019-03-06 ENCOUNTER — APPOINTMENT (OUTPATIENT)
Dept: LAB | Facility: CLINIC | Age: 44
End: 2019-03-06
Payer: COMMERCIAL

## 2019-03-06 ENCOUNTER — OFFICE VISIT (OUTPATIENT)
Dept: INTERNAL MEDICINE CLINIC | Facility: CLINIC | Age: 44
End: 2019-03-06
Payer: COMMERCIAL

## 2019-03-06 VITALS
HEART RATE: 70 BPM | WEIGHT: 235.6 LBS | DIASTOLIC BLOOD PRESSURE: 80 MMHG | OXYGEN SATURATION: 96 % | SYSTOLIC BLOOD PRESSURE: 146 MMHG | BODY MASS INDEX: 30.24 KG/M2 | HEIGHT: 74 IN

## 2019-03-06 DIAGNOSIS — Z12.11 COLON CANCER SCREENING: ICD-10-CM

## 2019-03-06 DIAGNOSIS — I10 ESSENTIAL HYPERTENSION: Chronic | ICD-10-CM

## 2019-03-06 DIAGNOSIS — Z12.5 PROSTATE CANCER SCREENING: ICD-10-CM

## 2019-03-06 DIAGNOSIS — E11.9 TYPE 2 DIABETES MELLITUS WITHOUT COMPLICATION, WITHOUT LONG-TERM CURRENT USE OF INSULIN (HCC): Primary | Chronic | ICD-10-CM

## 2019-03-06 DIAGNOSIS — E11.9 TYPE 2 DIABETES MELLITUS WITHOUT COMPLICATION, WITHOUT LONG-TERM CURRENT USE OF INSULIN (HCC): Chronic | ICD-10-CM

## 2019-03-06 PROBLEM — E78.5 HYPERLIPIDEMIA: Status: ACTIVE | Noted: 2019-03-06

## 2019-03-06 PROBLEM — E66.9 OBESITY: Status: ACTIVE | Noted: 2019-03-06

## 2019-03-06 LAB
ALBUMIN SERPL BCP-MCNC: 4 G/DL (ref 3.5–5)
ALP SERPL-CCNC: 123 U/L (ref 46–116)
ALT SERPL W P-5'-P-CCNC: 45 U/L (ref 12–78)
ANION GAP SERPL CALCULATED.3IONS-SCNC: 3 MMOL/L (ref 4–13)
AST SERPL W P-5'-P-CCNC: 22 U/L (ref 5–45)
BASOPHILS # BLD AUTO: 0.04 THOUSANDS/ΜL (ref 0–0.1)
BASOPHILS NFR BLD AUTO: 1 % (ref 0–1)
BILIRUB SERPL-MCNC: 0.59 MG/DL (ref 0.2–1)
BILIRUB UR QL STRIP: NEGATIVE
BUN SERPL-MCNC: 19 MG/DL (ref 5–25)
CALCIUM SERPL-MCNC: 8.5 MG/DL (ref 8.3–10.1)
CHLORIDE SERPL-SCNC: 105 MMOL/L (ref 100–108)
CHOLEST SERPL-MCNC: 104 MG/DL (ref 50–200)
CLARITY UR: CLEAR
CO2 SERPL-SCNC: 32 MMOL/L (ref 21–32)
COLOR UR: YELLOW
CREAT SERPL-MCNC: 0.89 MG/DL (ref 0.6–1.3)
CREAT UR-MCNC: 163 MG/DL
EOSINOPHIL # BLD AUTO: 0.12 THOUSAND/ΜL (ref 0–0.61)
EOSINOPHIL NFR BLD AUTO: 3 % (ref 0–6)
ERYTHROCYTE [DISTWIDTH] IN BLOOD BY AUTOMATED COUNT: 13.2 % (ref 11.6–15.1)
GFR SERPL CREATININE-BSD FRML MDRD: 103 ML/MIN/1.73SQ M
GLUCOSE P FAST SERPL-MCNC: 90 MG/DL (ref 65–99)
GLUCOSE UR STRIP-MCNC: NEGATIVE MG/DL
HCT VFR BLD AUTO: 41.9 % (ref 36.5–49.3)
HDLC SERPL-MCNC: 51 MG/DL (ref 40–60)
HGB BLD-MCNC: 13.5 G/DL (ref 12–17)
HGB UR QL STRIP.AUTO: NEGATIVE
IMM GRANULOCYTES # BLD AUTO: 0.01 THOUSAND/UL (ref 0–0.2)
IMM GRANULOCYTES NFR BLD AUTO: 0 % (ref 0–2)
KETONES UR STRIP-MCNC: NEGATIVE MG/DL
LDLC SERPL CALC-MCNC: 37 MG/DL (ref 0–100)
LEUKOCYTE ESTERASE UR QL STRIP: NEGATIVE
LYMPHOCYTES # BLD AUTO: 1.03 THOUSANDS/ΜL (ref 0.6–4.47)
LYMPHOCYTES NFR BLD AUTO: 28 % (ref 14–44)
MCH RBC QN AUTO: 29.2 PG (ref 26.8–34.3)
MCHC RBC AUTO-ENTMCNC: 32.2 G/DL (ref 31.4–37.4)
MCV RBC AUTO: 91 FL (ref 82–98)
MICROALBUMIN UR-MCNC: <5 MG/L (ref 0–20)
MICROALBUMIN/CREAT 24H UR: <3 MG/G CREATININE (ref 0–30)
MONOCYTES # BLD AUTO: 0.38 THOUSAND/ΜL (ref 0.17–1.22)
MONOCYTES NFR BLD AUTO: 10 % (ref 4–12)
NEUTROPHILS # BLD AUTO: 2.16 THOUSANDS/ΜL (ref 1.85–7.62)
NEUTS SEG NFR BLD AUTO: 58 % (ref 43–75)
NITRITE UR QL STRIP: NEGATIVE
NRBC BLD AUTO-RTO: 0 /100 WBCS
PH UR STRIP.AUTO: 6.5 [PH]
PLATELET # BLD AUTO: 204 THOUSANDS/UL (ref 149–390)
PMV BLD AUTO: 11.4 FL (ref 8.9–12.7)
POTASSIUM SERPL-SCNC: 3.7 MMOL/L (ref 3.5–5.3)
PROT SERPL-MCNC: 7.4 G/DL (ref 6.4–8.2)
PROT UR STRIP-MCNC: NEGATIVE MG/DL
RBC # BLD AUTO: 4.63 MILLION/UL (ref 3.88–5.62)
SODIUM SERPL-SCNC: 140 MMOL/L (ref 136–145)
SP GR UR STRIP.AUTO: 1.02 (ref 1–1.03)
TRIGL SERPL-MCNC: 82 MG/DL
TSH SERPL DL<=0.05 MIU/L-ACNC: 0.92 UIU/ML (ref 0.36–3.74)
UROBILINOGEN UR QL STRIP.AUTO: 1 E.U./DL
WBC # BLD AUTO: 3.74 THOUSAND/UL (ref 4.31–10.16)

## 2019-03-06 PROCEDURE — 81003 URINALYSIS AUTO W/O SCOPE: CPT | Performed by: INTERNAL MEDICINE

## 2019-03-06 PROCEDURE — 80061 LIPID PANEL: CPT

## 2019-03-06 PROCEDURE — 36415 COLL VENOUS BLD VENIPUNCTURE: CPT

## 2019-03-06 PROCEDURE — 84443 ASSAY THYROID STIM HORMONE: CPT

## 2019-03-06 PROCEDURE — 99213 OFFICE O/P EST LOW 20 MIN: CPT | Performed by: INTERNAL MEDICINE

## 2019-03-06 PROCEDURE — 82043 UR ALBUMIN QUANTITATIVE: CPT | Performed by: INTERNAL MEDICINE

## 2019-03-06 PROCEDURE — 1036F TOBACCO NON-USER: CPT | Performed by: INTERNAL MEDICINE

## 2019-03-06 PROCEDURE — 85025 COMPLETE CBC W/AUTO DIFF WBC: CPT

## 2019-03-06 PROCEDURE — 82570 ASSAY OF URINE CREATININE: CPT | Performed by: INTERNAL MEDICINE

## 2019-03-06 PROCEDURE — 80053 COMPREHEN METABOLIC PANEL: CPT

## 2019-03-06 NOTE — PROGRESS NOTES
Assessment/Plan:       Diagnoses and all orders for this visit:    Type 2 diabetes mellitus without complication, without long-term current use of insulin (HCC)    Essential hypertension    Colon cancer screening    Prostate cancer screening          There are no Patient Instructions on file for this visit  Subjective:      Patient ID: Ruby Dueñas is a 37 y o  male  A 68-year-old man who 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  Formally in excess of 300 lb he is now down to 235  The only symptoms orthopedic  He has had some ongoing right shoulder pain  Very active physically        The following portions of the patient's history were reviewed and updated as appropriate:   He has a past medical history of Hyperlipidemia and Obesity  ,  does not have any pertinent problems on file  ,   has a past surgical history that includes Vasectomy; Incision and drainage of wound (Right, 9/7/2017); and Knee surgery  ,  family history includes Diabetes in his mother; Hypertension in his father; Thyroid disease in his mother  ,   reports that he quit smoking about 9 years ago  He has a 40 00 pack-year smoking history  He has never used smokeless tobacco  He reports that he drinks alcohol  He reports that he does not use drugs  ,  is allergic to loratadine and metformin     Current Outpatient Medications   Medication Sig Dispense Refill    ADVOCATE LANCETS MISC by Does not apply route daily Patient Tests Once a Day  DX: E11 9 100 each 5    aspirin (ECOTRIN LOW STRENGTH) 81 mg EC tablet Take 81 mg by mouth daily      glucose blood test strip 1 each by Other route daily Patient Tests Once a Day  DX: E11 9 100 each 5    multivitamin (THERAGRAN) TABS Take 1 tablet by mouth daily       No current facility-administered medications for this visit  Review of Systems   Constitutional: Negative for chills and fever  HENT: Negative for sore throat and trouble swallowing  Eyes: Negative for pain  Respiratory: Negative for cough and shortness of breath  Cardiovascular: Negative for chest pain and palpitations  Gastrointestinal: Negative for abdominal pain, blood in stool, diarrhea, nausea and vomiting  Endocrine: Negative for cold intolerance and heat intolerance  Genitourinary: Negative for dysuria, frequency and testicular pain  Musculoskeletal: Positive for arthralgias  Negative for joint swelling  Allergic/Immunologic: Negative for immunocompromised state  Neurological: Negative for dizziness, syncope and headaches  Hematological: Negative for adenopathy  Does not bruise/bleed easily  Psychiatric/Behavioral: Negative for dysphoric mood  The patient is not nervous/anxious  Objective:  Vitals:    03/06/19 0801   BP: 146/80   Pulse: 70   SpO2: 96%      Physical Exam   Constitutional: He is oriented to person, place, and time  He appears well-developed and well-nourished  HENT:   Head: Normocephalic and atraumatic  Eyes: Pupils are equal, round, and reactive to light  Neck: Normal range of motion  Neck supple  No tracheal deviation present  No thyromegaly present  Cardiovascular: Normal rate, regular rhythm and normal heart sounds  Exam reveals no gallop  No murmur heard  Pulmonary/Chest: Effort normal  No respiratory distress  He has no wheezes  He has no rales  Abdominal: Soft  Bowel sounds are normal  There is no tenderness  Musculoskeletal: Normal range of motion  He exhibits no tenderness or deformity  Neurological: He is alert and oriented to person, place, and time  He has normal reflexes  Coordination normal    Skin: Skin is warm and dry  Psychiatric: He has a normal mood and affect

## 2019-03-06 NOTE — PROGRESS NOTES
Diabetic Foot Exam    Patient's shoes and socks removed  Right Foot/Ankle   Right Foot Inspection  Skin Exam: skin normal and skin intact no dry skin, no warmth, no callus, no erythema, no maceration, no abnormal color, no pre-ulcer, no ulcer and no callus                            Sensory       Monofilament testing: intact  Vascular  Capillary refills: < 3 seconds  The right DP pulse is 1+  The right PT pulse is 2+  Left Foot/Ankle  Left Foot Inspection  Skin Exam: skin normal and skin intactno dry skin, no warmth, no erythema, no maceration, normal color, no pre-ulcer, no ulcer and no callus                                         Sensory       Monofilament: intact  Vascular  Capillary refills: < 3 seconds  The left DP pulse is 1+  The left PT pulse is 2+  Assign Risk Category:  No deformity present; No loss of protective sensation;  No weak pulses       Risk: 0

## 2019-03-19 ENCOUNTER — TELEPHONE (OUTPATIENT)
Dept: INTERNAL MEDICINE CLINIC | Facility: CLINIC | Age: 44
End: 2019-03-19

## 2019-03-19 DIAGNOSIS — R74.8 ALKALINE PHOSPHATASE ELEVATION: ICD-10-CM

## 2019-03-19 DIAGNOSIS — E11.9 TYPE 2 DIABETES MELLITUS WITHOUT COMPLICATION, WITHOUT LONG-TERM CURRENT USE OF INSULIN (HCC): Primary | ICD-10-CM

## 2019-03-19 NOTE — TELEPHONE ENCOUNTER
Patient called- He was in 2 weeks ago and had labs done but was never called with results  Please call him ASAP  He's worried      PH#[phone]

## 2019-03-19 NOTE — TELEPHONE ENCOUNTER
Only abnormality was slight elevation alkaline phosphatase probably due to fatty liver; I would like him to repeat that just to make sure that is the case  1 problem, lab did not do the A1c  I do not know why but he has to do that    Get these labs done and call me in 2 business days

## 2019-03-25 ENCOUNTER — APPOINTMENT (OUTPATIENT)
Dept: LAB | Facility: CLINIC | Age: 44
End: 2019-03-25
Payer: COMMERCIAL

## 2019-03-25 DIAGNOSIS — E11.9 TYPE 2 DIABETES MELLITUS WITHOUT COMPLICATION, WITHOUT LONG-TERM CURRENT USE OF INSULIN (HCC): ICD-10-CM

## 2019-03-25 DIAGNOSIS — R74.8 ALKALINE PHOSPHATASE ELEVATION: ICD-10-CM

## 2019-03-25 LAB
25(OH)D3 SERPL-MCNC: 29.3 NG/ML (ref 30–100)
EST. AVERAGE GLUCOSE BLD GHB EST-MCNC: 117 MG/DL
HBA1C MFR BLD: 5.7 % (ref 4.2–6.3)
IGA SERPL-MCNC: 322 MG/DL (ref 70–400)
IGG SERPL-MCNC: 1290 MG/DL (ref 700–1600)
IGM SERPL-MCNC: 64 MG/DL (ref 40–230)

## 2019-03-25 PROCEDURE — 83036 HEMOGLOBIN GLYCOSYLATED A1C: CPT

## 2019-03-25 PROCEDURE — 84075 ASSAY ALKALINE PHOSPHATASE: CPT

## 2019-03-25 PROCEDURE — 86256 FLUORESCENT ANTIBODY TITER: CPT

## 2019-03-25 PROCEDURE — 84165 PROTEIN E-PHORESIS SERUM: CPT | Performed by: PATHOLOGY

## 2019-03-25 PROCEDURE — 36415 COLL VENOUS BLD VENIPUNCTURE: CPT

## 2019-03-25 PROCEDURE — 82306 VITAMIN D 25 HYDROXY: CPT

## 2019-03-25 PROCEDURE — 82784 ASSAY IGA/IGD/IGG/IGM EACH: CPT

## 2019-03-25 PROCEDURE — 84165 PROTEIN E-PHORESIS SERUM: CPT

## 2019-03-25 PROCEDURE — 84080 ASSAY ALKALINE PHOSPHATASES: CPT

## 2019-03-26 LAB — MITOCHONDRIA M2 IGG SER-ACNC: <20 UNITS (ref 0–20)

## 2019-03-27 LAB
ALBUMIN SERPL ELPH-MCNC: 4.24 G/DL (ref 3.5–5)
ALBUMIN SERPL ELPH-MCNC: 58.9 % (ref 52–65)
ALP BONE CFR SERPL: 50 % (ref 12–68)
ALP INTEST CFR SERPL: 3 % (ref 0–18)
ALP LIVER CFR SERPL: 47 % (ref 13–88)
ALP SERPL-CCNC: 124 IU/L (ref 39–117)
ALPHA1 GLOB SERPL ELPH-MCNC: 0.28 G/DL (ref 0.1–0.4)
ALPHA1 GLOB SERPL ELPH-MCNC: 3.9 % (ref 2.5–5)
ALPHA2 GLOB SERPL ELPH-MCNC: 0.58 G/DL (ref 0.4–1.2)
ALPHA2 GLOB SERPL ELPH-MCNC: 8.1 % (ref 7–13)
BETA GLOB ABNORMAL SERPL ELPH-MCNC: 0.4 G/DL (ref 0.4–0.8)
BETA1 GLOB SERPL ELPH-MCNC: 5.6 % (ref 5–13)
BETA2 GLOB SERPL ELPH-MCNC: 5.9 % (ref 2–8)
BETA2+GAMMA GLOB SERPL ELPH-MCNC: 0.42 G/DL (ref 0.2–0.5)
GAMMA GLOB ABNORMAL SERPL ELPH-MCNC: 1.27 G/DL (ref 0.5–1.6)
GAMMA GLOB SERPL ELPH-MCNC: 17.6 % (ref 12–22)
IGG/ALB SER: 1.43 {RATIO} (ref 1.1–1.8)
PROT PATTERN SERPL ELPH-IMP: NORMAL
PROT SERPL-MCNC: 7.2 G/DL (ref 6.4–8.2)

## 2019-04-01 ENCOUNTER — TELEPHONE (OUTPATIENT)
Dept: INTERNAL MEDICINE CLINIC | Facility: CLINIC | Age: 44
End: 2019-04-01

## 2019-04-01 DIAGNOSIS — N52.9 ERECTILE DYSFUNCTION, UNSPECIFIED ERECTILE DYSFUNCTION TYPE: Primary | ICD-10-CM

## 2019-04-02 DIAGNOSIS — R74.8 ALKALINE PHOSPHATASE ELEVATION: Primary | ICD-10-CM

## 2019-04-02 DIAGNOSIS — K75.81 NASH (NONALCOHOLIC STEATOHEPATITIS): ICD-10-CM

## 2019-04-03 ENCOUNTER — TELEPHONE (OUTPATIENT)
Dept: INTERNAL MEDICINE CLINIC | Facility: CLINIC | Age: 44
End: 2019-04-03

## 2019-04-10 ENCOUNTER — HOSPITAL ENCOUNTER (OUTPATIENT)
Dept: ULTRASOUND IMAGING | Facility: HOSPITAL | Age: 44
Discharge: HOME | End: 2019-04-10
Attending: INTERNAL MEDICINE
Payer: COMMERCIAL

## 2019-04-10 DIAGNOSIS — R74.8 ALKALINE PHOSPHATASE ELEVATION: ICD-10-CM

## 2019-04-10 DIAGNOSIS — K75.81 NASH (NONALCOHOLIC STEATOHEPATITIS): ICD-10-CM

## 2019-04-10 PROCEDURE — 76705 ECHO EXAM OF ABDOMEN: CPT

## 2019-04-17 ENCOUNTER — TELEPHONE (OUTPATIENT)
Dept: INTERNAL MEDICINE CLINIC | Facility: CLINIC | Age: 44
End: 2019-04-17

## 2019-04-25 ENCOUNTER — OFFICE VISIT (OUTPATIENT)
Dept: OBGYN CLINIC | Facility: CLINIC | Age: 44
End: 2019-04-25
Payer: COMMERCIAL

## 2019-04-25 ENCOUNTER — APPOINTMENT (OUTPATIENT)
Dept: RADIOLOGY | Facility: CLINIC | Age: 44
End: 2019-04-25
Payer: COMMERCIAL

## 2019-04-25 VITALS — BODY MASS INDEX: 29.72 KG/M2 | WEIGHT: 239 LBS | HEIGHT: 75 IN

## 2019-04-25 DIAGNOSIS — M25.561 ACUTE PAIN OF RIGHT KNEE: ICD-10-CM

## 2019-04-25 DIAGNOSIS — M17.11 PRIMARY OSTEOARTHRITIS OF RIGHT KNEE: Primary | ICD-10-CM

## 2019-04-25 DIAGNOSIS — M22.2X1 PATELLOFEMORAL SYNDROME OF RIGHT KNEE: ICD-10-CM

## 2019-04-25 PROCEDURE — 99243 OFF/OP CNSLTJ NEW/EST LOW 30: CPT | Performed by: FAMILY MEDICINE

## 2019-04-25 PROCEDURE — 73562 X-RAY EXAM OF KNEE 3: CPT

## 2019-06-06 ENCOUNTER — HOSPITAL ENCOUNTER (EMERGENCY)
Facility: HOSPITAL | Age: 44
Discharge: HOME | End: 2019-06-07
Attending: EMERGENCY MEDICINE | Admitting: EMERGENCY MEDICINE
Payer: COMMERCIAL

## 2019-06-06 ENCOUNTER — APPOINTMENT (EMERGENCY)
Dept: RADIOLOGY | Facility: HOSPITAL | Age: 44
End: 2019-06-06
Payer: COMMERCIAL

## 2019-06-06 VITALS
BODY MASS INDEX: 29.37 KG/M2 | DIASTOLIC BLOOD PRESSURE: 95 MMHG | TEMPERATURE: 98.4 F | HEART RATE: 59 BPM | OXYGEN SATURATION: 99 % | SYSTOLIC BLOOD PRESSURE: 196 MMHG | RESPIRATION RATE: 16 BRPM | HEIGHT: 76 IN | WEIGHT: 241.18 LBS

## 2019-06-06 DIAGNOSIS — S62.101A RIGHT WRIST FRACTURE: Primary | ICD-10-CM

## 2019-06-06 PROCEDURE — 73110 X-RAY EXAM OF WRIST: CPT

## 2019-06-06 PROCEDURE — 99283 EMERGENCY DEPT VISIT LOW MDM: CPT

## 2019-06-07 PROCEDURE — 99283 EMERGENCY DEPT VISIT LOW MDM: CPT | Performed by: EMERGENCY MEDICINE

## 2019-06-10 ENCOUNTER — APPOINTMENT (OUTPATIENT)
Dept: RADIOLOGY | Facility: MEDICAL CENTER | Age: 44
End: 2019-06-10
Payer: COMMERCIAL

## 2019-06-10 ENCOUNTER — OFFICE VISIT (OUTPATIENT)
Dept: OBGYN CLINIC | Facility: MEDICAL CENTER | Age: 44
End: 2019-06-10
Payer: COMMERCIAL

## 2019-06-10 VITALS
HEIGHT: 76 IN | HEART RATE: 60 BPM | BODY MASS INDEX: 29.27 KG/M2 | DIASTOLIC BLOOD PRESSURE: 91 MMHG | SYSTOLIC BLOOD PRESSURE: 167 MMHG | WEIGHT: 240.4 LBS

## 2019-06-10 DIAGNOSIS — S52.501A CLOSED FRACTURE OF DISTAL ENDS OF RIGHT RADIUS AND ULNA, INITIAL ENCOUNTER: Primary | ICD-10-CM

## 2019-06-10 DIAGNOSIS — M25.531 RIGHT WRIST PAIN: ICD-10-CM

## 2019-06-10 DIAGNOSIS — S52.601A CLOSED FRACTURE OF DISTAL ENDS OF RIGHT RADIUS AND ULNA, INITIAL ENCOUNTER: Primary | ICD-10-CM

## 2019-06-10 PROCEDURE — 25600 CLTX DST RDL FX/EPHYS SEP WO: CPT | Performed by: ORTHOPAEDIC SURGERY

## 2019-06-10 PROCEDURE — 73110 X-RAY EXAM OF WRIST: CPT

## 2019-06-10 PROCEDURE — 99213 OFFICE O/P EST LOW 20 MIN: CPT | Performed by: ORTHOPAEDIC SURGERY

## 2019-06-10 RX ORDER — FLUTICASONE PROPIONATE 50 MCG
SPRAY, SUSPENSION (ML) NASAL
Refills: 1 | COMMUNITY
Start: 2019-04-19 | End: 2019-06-10

## 2019-07-09 ENCOUNTER — APPOINTMENT (OUTPATIENT)
Dept: RADIOLOGY | Facility: CLINIC | Age: 44
End: 2019-07-09
Payer: COMMERCIAL

## 2019-07-09 ENCOUNTER — OFFICE VISIT (OUTPATIENT)
Dept: OBGYN CLINIC | Facility: CLINIC | Age: 44
End: 2019-07-09

## 2019-07-09 ENCOUNTER — OFFICE VISIT (OUTPATIENT)
Dept: OCCUPATIONAL THERAPY | Facility: CLINIC | Age: 44
End: 2019-07-09
Payer: COMMERCIAL

## 2019-07-09 VITALS
HEIGHT: 76 IN | BODY MASS INDEX: 29.27 KG/M2 | HEART RATE: 64 BPM | DIASTOLIC BLOOD PRESSURE: 94 MMHG | WEIGHT: 240.4 LBS | SYSTOLIC BLOOD PRESSURE: 167 MMHG

## 2019-07-09 DIAGNOSIS — S52.601D CLOSED FRACTURE OF DISTAL ENDS OF RIGHT RADIUS AND ULNA WITH ROUTINE HEALING, SUBSEQUENT ENCOUNTER: Primary | ICD-10-CM

## 2019-07-09 DIAGNOSIS — S52.501D CLOSED FRACTURE OF DISTAL ENDS OF RIGHT RADIUS AND ULNA WITH ROUTINE HEALING, SUBSEQUENT ENCOUNTER: Primary | ICD-10-CM

## 2019-07-09 DIAGNOSIS — S52.601A CLOSED FRACTURE OF DISTAL ENDS OF RIGHT RADIUS AND ULNA, INITIAL ENCOUNTER: ICD-10-CM

## 2019-07-09 DIAGNOSIS — S52.501A CLOSED FRACTURE OF DISTAL ENDS OF RIGHT RADIUS AND ULNA, INITIAL ENCOUNTER: ICD-10-CM

## 2019-07-09 PROCEDURE — 73110 X-RAY EXAM OF WRIST: CPT

## 2019-07-09 PROCEDURE — 99024 POSTOP FOLLOW-UP VISIT: CPT | Performed by: ORTHOPAEDIC SURGERY

## 2019-07-09 PROCEDURE — 97760 ORTHOTIC MGMT&TRAING 1ST ENC: CPT

## 2019-07-09 NOTE — PROGRESS NOTES
CHIEF COMPLAINT:  Chief Complaint   Patient presents with    Right Wrist - Follow-up       SUBJECTIVE:  Radha Disla is a 53y o  year old RHD male who presents to the office for a follow up revaluation of right wrist distal radius and ulnar styloid fracture and traquetral avulsion fracture sustained 19 when he was attempting to break a board with a downward strike  Pt has been treated conservatively with a short arm cast applied on 6/10/19  Pt states that he has had no problems with the cast  Pt reports no pain since the cast was removed  PAST MEDICAL HISTORY:  Past Medical History:   Diagnosis Date    Diabetes mellitus ([address] )     Hyperlipidemia     Obesity        PAST SURGICAL HISTORY:  Past Surgical History:   Procedure Laterality Date    INCISION AND DRAINAGE OF WOUND Right 2017    Procedure: INCISION AND DRAINAGE (I&D) BUTTOCK;  Surgeon: Lamar Burrell MD;  Location: [facility];  Service: General    VASECTOMY         FAMILY HISTORY:  Family History   Problem Relation Age of Onset    Diabetes Mother     Thyroid disease Mother     Hypertension Father        SOCIAL HISTORY:  Social History     Tobacco Use    Smoking status: Former Smoker     Packs/day: 2 00     Years: 20 00     Pack years: 40 00     Last attempt to quit: 2010     Years since quittin 5    Smokeless tobacco: Never Used   Substance Use Topics    Alcohol use: Yes     Comment: occasionally    Drug use: No       MEDICATIONS:    Current Outpatient Medications:     ADVOCATE LANCETS MISC, by Does not apply route daily Patient Tests Once a Day  DX: E11 9, Disp: 100 each, Rfl: 5    glucose blood test strip, 1 each by Other route daily Patient Tests Once a Day   DX: E11 9, Disp: 100 each, Rfl: 5    multivitamin (THERAGRAN) TABS, Take 1 tablet by mouth daily, Disp: , Rfl:     ALLERGIES:  Allergies   Allergen Reactions    Loratadine Hives    Metformin Itching       REVIEW OF SYSTEMS:  Review of Systems   Constitutional: Negative for chills, fever and unexpected weight change  HENT: Negative for hearing loss, nosebleeds and sore throat  Eyes: Negative for pain, redness and visual disturbance  Respiratory: Negative for cough, shortness of breath and wheezing  Cardiovascular: Negative for chest pain, palpitations and leg swelling  Gastrointestinal: Negative for abdominal pain, nausea and vomiting  Endocrine: Negative for polydipsia and polyuria  Genitourinary: Negative for dysuria and hematuria  Skin: Negative for rash and wound  Neurological: Negative for light-headedness, numbness and headaches  Psychiatric/Behavioral: Negative for decreased concentration, dysphoric mood and suicidal ideas  The patient is not nervous/anxious          VITALS:  Vitals:    07/09/19 0907   BP: 167/94   Pulse: 64       LABS:  HgA1c:   Lab Results   Component Value Date    HGBA1C 5 7 03/25/2019     BMP:   Lab Results   Component Value Date    GLUCOSE 141 (H) 09/23/2015    CALCIUM 8 5 03/06/2019     09/23/2015    K 3 7 03/06/2019    CO2 32 03/06/2019     03/06/2019    BUN 19 03/06/2019    CREATININE 0 89 03/06/2019       _____________________________________________________  PHYSICAL EXAMINATION:  General: well developed and well nourished, alert, oriented times 3 and appears comfortable  Psychiatric: Normal  HEENT: Trachea Midline, No torticollis  Pulmonary: No audible wheezing or respiratory distress   Skin: No masses, erythema, lacerations, fluctation, ulcerations  Neurovascular: Sensation Intact to the Median, Ulnar, Radial Nerve, Motor Intact to the Median, Ulnar, Radial Nerve and Pulses Intact    MUSCULOSKELETAL EXAMINATION:  Right wrist  No swelling erythema or ecchymosis  No tenderness with palpation of the fracture site   No pain with loading of the wrist  ___________________________________________________  STUDIES REVIEWED:  Images obtained today of the right wrist 3 views demonstrate healing nondisplaced intra-articular distal radius fracture, ulnar styloid fracture, small triquetral avulsion fracture with callus formation       PROCEDURES PERFORMED:  Procedures  No Procedures performed today    _____________________________________________________  ASSESSMENT/PLAN:    Right nondisplaced intra- articular fracture of the radius and ulnar styloid and small triquetral avulsion fracture   * SAC was removed today prior to xrays  * OT order was placed for fabrication of volar wrist splint to be worn when patient leaves the house and with activity  * Pt was advised that he could not yet begin training and he was to avoid lifting and impact until follow up apt  * pt will follow up in the office in 3 weeks      Follow Up:  Return in about 3 weeks (around 7/30/2019)        To Do Next Visit:  Re-evaluation of current issue xr right wrist         Scribe Attestation    I,:   Suzan Richard am acting as a scribe while in the presence of the attending physician :        I,:   Facundo Garcia MD personally performed the services described in this documentation    as scribed in my presence :

## 2019-07-09 NOTE — PROGRESS NOTES
Orthosis    Diagnosis:   1  Closed fracture of distal ends of right radius and ulna with routine healing, subsequent encounter       Indication: Fracture    Location: Right  wrist  Supplies: Custom Fit Orthotic and Skin coverage   Orthosis type: Wrist splint  Wearing Schedule: As Needed and With Activity Only  Describe Position: wrist neutral    Precautions: Fracture and Universal (skin contact/breakdown)    Patient or Caregiver expresses understanding of wearing Schedule and Precautions? Yes  Patient or Caregiver able to don/doff orthotic independently? Yes    Written orders provided to patient?  No  Orders Obtained: Written  Orders Obtained from: Dr Cherelle Evans    Return for evaluation and treatment No

## 2019-07-17 DIAGNOSIS — S52.601D CLOSED FRACTURE OF DISTAL ENDS OF RIGHT RADIUS AND ULNA WITH ROUTINE HEALING, SUBSEQUENT ENCOUNTER: Primary | ICD-10-CM

## 2019-07-17 DIAGNOSIS — S52.501D CLOSED FRACTURE OF DISTAL ENDS OF RIGHT RADIUS AND ULNA WITH ROUTINE HEALING, SUBSEQUENT ENCOUNTER: Primary | ICD-10-CM

## 2019-07-24 ENCOUNTER — HOSPITAL ENCOUNTER (OUTPATIENT)
Dept: RADIOLOGY | Facility: HOSPITAL | Age: 44
Discharge: HOME | End: 2019-07-24
Attending: ORTHOPAEDIC SURGERY
Payer: COMMERCIAL

## 2019-07-24 ENCOUNTER — APPOINTMENT (OUTPATIENT)
Dept: RADIOLOGY | Facility: CLINIC | Age: 44
End: 2019-07-24
Payer: COMMERCIAL

## 2019-07-24 ENCOUNTER — OFFICE VISIT (OUTPATIENT)
Dept: OBGYN CLINIC | Facility: CLINIC | Age: 44
End: 2019-07-24

## 2019-07-24 VITALS
BODY MASS INDEX: 29.35 KG/M2 | HEIGHT: 76 IN | HEART RATE: 70 BPM | SYSTOLIC BLOOD PRESSURE: 147 MMHG | DIASTOLIC BLOOD PRESSURE: 90 MMHG | WEIGHT: 241 LBS

## 2019-07-24 DIAGNOSIS — S52.501D CLOSED FRACTURE OF DISTAL ENDS OF RIGHT RADIUS AND ULNA WITH ROUTINE HEALING, SUBSEQUENT ENCOUNTER: ICD-10-CM

## 2019-07-24 DIAGNOSIS — S52.501D CLOSED FRACTURE OF DISTAL ENDS OF RIGHT RADIUS AND ULNA WITH ROUTINE HEALING, SUBSEQUENT ENCOUNTER: Primary | ICD-10-CM

## 2019-07-24 DIAGNOSIS — S52.601D CLOSED FRACTURE OF DISTAL ENDS OF RIGHT RADIUS AND ULNA WITH ROUTINE HEALING, SUBSEQUENT ENCOUNTER: Primary | ICD-10-CM

## 2019-07-24 DIAGNOSIS — S52.601D CLOSED FRACTURE OF DISTAL ENDS OF RIGHT RADIUS AND ULNA WITH ROUTINE HEALING, SUBSEQUENT ENCOUNTER: ICD-10-CM

## 2019-07-24 PROCEDURE — 73110 X-RAY EXAM OF WRIST: CPT

## 2019-07-24 PROCEDURE — 99024 POSTOP FOLLOW-UP VISIT: CPT | Performed by: ORTHOPAEDIC SURGERY

## 2019-07-24 RX ORDER — MELATONIN
1000 DAILY
COMMUNITY

## 2019-07-24 NOTE — PROGRESS NOTES
CHIEF COMPLAINT:  Chief Complaint   Patient presents with    Right Wrist - Follow-up       SUBJECTIVE:  Blake Dooley is a 53y o  year old RHD male who presents who presents to the office for a follow up revaluation of right wrist distal radius and ulnar styloid fracture and traquetral avulsion fracture sustained 19 when he was attempting to break a board with a downward strike  Pt has been treated conservatively with a short arm cast applied on 6/10/19  Pt was treated with [facility] and transitioned to a volar splint  Today patient states that he is not having pain in his wrist but does have stiff motion  PAST MEDICAL HISTORY:  Past Medical History:   Diagnosis Date    Diabetes mellitus ([address] )     Hyperlipidemia     Obesity        PAST SURGICAL HISTORY:  Past Surgical History:   Procedure Laterality Date    INCISION AND DRAINAGE OF WOUND Right 2017    Procedure: INCISION AND DRAINAGE (I&D) BUTTOCK;  Surgeon: Tej Mayorga MD;  Location: [facility] OR;  Service: General    VASECTOMY         FAMILY HISTORY:  Family History   Problem Relation Age of Onset    Diabetes Mother     Thyroid disease Mother     Hypertension Father        SOCIAL HISTORY:  Social History     Tobacco Use    Smoking status: Former Smoker     Packs/day: 2 00     Years: 20 00     Pack years: 40 00     Last attempt to quit: 2010     Years since quittin 5    Smokeless tobacco: Never Used   Substance Use Topics    Alcohol use: Yes     Comment: occasionally    Drug use: No       MEDICATIONS:    Current Outpatient Medications:     ADVOCATE LANCETS MISC, by Does not apply route daily Patient Tests Once a Day  DX: E11 9, Disp: 100 each, Rfl: 5    cholecalciferol (VITAMIN D3) 1,000 units tablet, Take 1,000 Units by mouth daily, Disp: , Rfl:     glucose blood test strip, 1 each by Other route daily Patient Tests Once a Day   DX: E11 9, Disp: 100 each, Rfl: 5    multivitamin (THERAGRAN) TABS, Take 1 tablet by mouth daily, Disp: , Rfl:     ALLERGIES:  Allergies   Allergen Reactions    Loratadine Hives    Metformin Itching       REVIEW OF SYSTEMS:  Review of Systems   Constitutional: Negative for chills, fever and unexpected weight change  HENT: Negative for hearing loss, nosebleeds and sore throat  Eyes: Negative for pain, redness and visual disturbance  Respiratory: Negative for cough, shortness of breath and wheezing  Cardiovascular: Negative for chest pain, palpitations and leg swelling  Gastrointestinal: Negative for abdominal pain, nausea and vomiting  Endocrine: Negative for polydipsia and polyuria  Genitourinary: Negative for dysuria and hematuria  Skin: Negative for rash and wound  Neurological: Negative for light-headedness, numbness and headaches  Psychiatric/Behavioral: Negative for decreased concentration, dysphoric mood and suicidal ideas  The patient is not nervous/anxious          VITALS:  Vitals:    07/24/19 0909   BP: 147/90   Pulse: 70       LABS:  HgA1c:   Lab Results   Component Value Date    HGBA1C 5 7 03/25/2019     BMP:   Lab Results   Component Value Date    GLUCOSE 141 (H) 09/23/2015    CALCIUM 8 5 03/06/2019     09/23/2015    K 3 7 03/06/2019    CO2 32 03/06/2019     03/06/2019    BUN 19 03/06/2019    CREATININE 0 89 03/06/2019       _____________________________________________________  PHYSICAL EXAMINATION:  General: well developed and well nourished, alert, oriented times 3 and appears comfortable  Psychiatric: Normal  HEENT: Trachea Midline, No torticollis  Pulmonary: No audible wheezing or respiratory distress   Skin: No masses, erythema, lacerations, fluctation, ulcerations  Neurovascular: Sensation Intact to the Median, Ulnar, Radial Nerve, Motor Intact to the Median, Ulnar, Radial Nerve and Pulses Intact    MUSCULOSKELETAL EXAMINATION:  Right wrist  No swelling erythema or ecchymosis  No tenderness with palpation over the ulnar styloid  No tenderness with palpation of the distal radius  Slightly Limited flexion and extension when compared to contralateral side       ___________________________________________________  STUDIES REVIEWED:  Images obtained today of the right wrist 3 views demonstrate healed distal radius fracture with 2 mm step off maintained alignment of ulnar styloid fracture      PROCEDURES PERFORMED:  Procedures  No Procedures performed today    _____________________________________________________  ASSESSMENT/PLAN:    Right intra- articular fracture of the radius with 2mm articular step off and ulnar styloid and small triquetral avulsion fracture clinically and radiographically healed   *Clinically, patient has no pain and minimal limitations in motion  * Pt was advised to continue to work on motion of the wrist as shown in the office today  * Pt may return to normal activity once he regains full including push ups   * Pt was advised to call the office if he has any questions or concerns  * Pt will follow up on an as needed basis        Follow Up:  Return if symptoms worsen or fail to improve        To Do Next Visit:  Re-evaluation of current issue        Scribe Attestation    I,:   Catina Alexis am acting as a scribe while in the presence of the attending physician :        I,:   Perla Bhatti MD personally performed the services described in this documentation    as scribed in my presence :

## 2019-09-11 LAB
LEFT EYE DIABETIC RETINOPATHY: NORMAL
RIGHT EYE DIABETIC RETINOPATHY: NORMAL

## 2019-09-19 ENCOUNTER — APPOINTMENT (OUTPATIENT)
Dept: LAB | Facility: CLINIC | Age: 44
End: 2019-09-19
Payer: COMMERCIAL

## 2019-09-19 ENCOUNTER — OFFICE VISIT (OUTPATIENT)
Dept: INTERNAL MEDICINE CLINIC | Facility: CLINIC | Age: 44
End: 2019-09-19
Payer: COMMERCIAL

## 2019-09-19 VITALS
HEART RATE: 59 BPM | WEIGHT: 236.6 LBS | SYSTOLIC BLOOD PRESSURE: 140 MMHG | OXYGEN SATURATION: 96 % | HEIGHT: 74 IN | BODY MASS INDEX: 30.36 KG/M2 | DIASTOLIC BLOOD PRESSURE: 80 MMHG

## 2019-09-19 DIAGNOSIS — R74.8 ALKALINE PHOSPHATASE ELEVATION: ICD-10-CM

## 2019-09-19 DIAGNOSIS — R03.0 PRE-HYPERTENSION: Primary | ICD-10-CM

## 2019-09-19 DIAGNOSIS — E11.9 TYPE 2 DIABETES MELLITUS WITHOUT COMPLICATION, WITHOUT LONG-TERM CURRENT USE OF INSULIN (HCC): ICD-10-CM

## 2019-09-19 DIAGNOSIS — E78.2 MIXED HYPERLIPIDEMIA: ICD-10-CM

## 2019-09-19 LAB
ALBUMIN SERPL BCP-MCNC: 4.1 G/DL (ref 3.5–5)
ALP SERPL-CCNC: 137 U/L (ref 46–116)
ALT SERPL W P-5'-P-CCNC: 53 U/L (ref 12–78)
ANION GAP SERPL CALCULATED.3IONS-SCNC: 4 MMOL/L (ref 4–13)
AST SERPL W P-5'-P-CCNC: 51 U/L (ref 5–45)
BILIRUB DIRECT SERPL-MCNC: 0.23 MG/DL (ref 0–0.2)
BILIRUB SERPL-MCNC: 0.67 MG/DL (ref 0.2–1)
BUN SERPL-MCNC: 22 MG/DL (ref 5–25)
CALCIUM SERPL-MCNC: 8.8 MG/DL (ref 8.3–10.1)
CHLORIDE SERPL-SCNC: 105 MMOL/L (ref 100–108)
CO2 SERPL-SCNC: 30 MMOL/L (ref 21–32)
CREAT SERPL-MCNC: 1.03 MG/DL (ref 0.6–1.3)
EST. AVERAGE GLUCOSE BLD GHB EST-MCNC: 105 MG/DL
GFR SERPL CREATININE-BSD FRML MDRD: 87 ML/MIN/1.73SQ M
GLUCOSE P FAST SERPL-MCNC: 90 MG/DL (ref 65–99)
HBA1C MFR BLD: 5.3 % (ref 4.2–6.3)
POTASSIUM SERPL-SCNC: 4 MMOL/L (ref 3.5–5.3)
PROT SERPL-MCNC: 7.7 G/DL (ref 6.4–8.2)
SODIUM SERPL-SCNC: 139 MMOL/L (ref 136–145)

## 2019-09-19 PROCEDURE — 99214 OFFICE O/P EST MOD 30 MIN: CPT | Performed by: INTERNAL MEDICINE

## 2019-09-19 PROCEDURE — 36415 COLL VENOUS BLD VENIPUNCTURE: CPT

## 2019-09-19 PROCEDURE — 83036 HEMOGLOBIN GLYCOSYLATED A1C: CPT

## 2019-09-19 PROCEDURE — 80048 BASIC METABOLIC PNL TOTAL CA: CPT

## 2019-09-19 PROCEDURE — 80076 HEPATIC FUNCTION PANEL: CPT

## 2019-09-19 RX ORDER — IBUPROFEN 200 MG
TABLET ORAL EVERY 6 HOURS PRN
COMMUNITY

## 2019-09-19 NOTE — PATIENT INSTRUCTIONS
A patient with the above history and physical  The metabolic syndrome has been pretty much eliminated by change in lifestyle  No longer diabetic but worse prediabetic  Hyperlipidemia dramatically better  Blood pressure remains problematic  Depending upon whose recommendation you read he is either prehypertensive hypertensive  or normal   I suspect we can classify him as prehypertensive and recommendation here is to continue lifestyle modification is sodium restriction  He does not want to take medicine so I can except that  Elevated alkaline phosphatase has persisted  I would like to recheck the hepatic panel today and look at her liver ultrasound with a working diagnosis of fatty liver  Given these abnormalities, I would like to see him back in about 6 months  Otherwise, it would be a year

## 2019-09-19 NOTE — PROGRESS NOTES
Assessment/Plan:       Diagnoses and all orders for this visit:    Pre-hypertension    Mixed hyperlipidemia    Alkaline phosphatase elevation  -     Hepatic function panel; Future  -     US liver; Future    Type 2 diabetes mellitus without complication, without long-term current use of insulin (HCC)  -     Hemoglobin A1C; Future  -     Basic metabolic panel; Future    Other orders  -     ibuprofen (MOTRIN) 200 mg tablet; Take by mouth every 6 (six) hours as needed for mild pain          Patient Instructions   A patient with the above history and physical  The metabolic syndrome has been pretty much eliminated by change in lifestyle  No longer diabetic but worse prediabetic  Hyperlipidemia dramatically better  Blood pressure remains problematic  Depending upon whose recommendation you read he is either prehypertensive hypertensive  or normal   I suspect we can classify him as prehypertensive and recommendation here is to continue lifestyle modification is sodium restriction  He does not want to take medicine so I can except that  Elevated alkaline phosphatase has persisted  I would like to recheck the hepatic panel today and look at her liver ultrasound with a working diagnosis of fatty liver  Given these abnormalities, I would like to see him back in about 6 months  Otherwise, it would be a year  Subjective:      Patient ID: Ale Springer is a 53 y o  male  A 24-year-old man who 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  Formally in excess of 300 lb he is now down to 235  The only symptoms orthopedic  He has had some ongoing right shoulder pain  Very active physically    The patient is change in dietary habits and change in exercise pattern was remarkably successful in reversing a number of metabolic derangements  In particular, he went from being frankly diabetic 2 at worst prediabetic    The most recent hemoglobin A1c was 5 7%    Even more remarkable was to dramatic drop in lipids  His cholesterol several years ago was 270 and recheck a couple of months ago was 104  His triglycerides were in excess of 400 and now 82    The 1 thing that in changes alk-phos elevation  This is persisted  This was fractionated and the very low elevation of 124 persisted with normal fractionation levels  Immunoglobulin profile was normal   Serum protein electrophoresis was normal   Anti mitochondrial antibody was normal  So we are left with a bit of a history  Most likely diagnosis is still fatty liver  I would like to see the ultrasound a recheck  I do not think or at the level of needing a GI consult yet but if the liver is enlarged I will change my mind about that  The following portions of the patient's history were reviewed and updated as appropriate:   He has a past medical history of Hyperlipidemia and Obesity  ,  does not have any pertinent problems on file  ,   has a past surgical history that includes Vasectomy and Incision and drainage of wound (Right, 9/7/2017)  ,  family history includes Diabetes in his mother; Hypertension in his father; Thyroid disease in his mother  ,   reports that he quit smoking about 9 years ago  He has a 40 00 pack-year smoking history  He has never used smokeless tobacco  He reports that he drinks alcohol  He reports that he does not use drugs  ,  is allergic to loratadine and metformin     Current Outpatient Medications   Medication Sig Dispense Refill    ADVOCATE LANCETS MISC by Does not apply route daily Patient Tests Once a Day  DX: E11 9 100 each 5    cholecalciferol (VITAMIN D3) 1,000 units tablet Take 1,000 Units by mouth daily      glucose blood test strip 1 each by Other route daily Patient Tests Once a Day  DX: E11 9 100 each 5    ibuprofen (MOTRIN) 200 mg tablet Take by mouth every 6 (six) hours as needed for mild pain      multivitamin (THERAGRAN) TABS Take 1 tablet by mouth daily       No current facility-administered medications for this visit  Review of Systems   Constitutional: Negative for chills and fever  HENT: Negative for sore throat and trouble swallowing  Eyes: Negative for pain  Respiratory: Negative for cough and shortness of breath  Cardiovascular: Negative for chest pain and palpitations  Gastrointestinal: Negative for abdominal pain, blood in stool, diarrhea, nausea and vomiting  Endocrine: Negative for cold intolerance and heat intolerance  Genitourinary: Negative for dysuria, frequency and testicular pain  Musculoskeletal: Negative for joint swelling  Allergic/Immunologic: Negative for immunocompromised state  Neurological: Negative for dizziness, syncope and headaches  Hematological: Negative for adenopathy  Does not bruise/bleed easily  Psychiatric/Behavioral: Negative for dysphoric mood  The patient is not nervous/anxious  Objective:  Vitals:    09/19/19 0923   BP: 140/80   Pulse:    SpO2:       Physical Exam   Constitutional: He is oriented to person, place, and time  He appears well-developed and well-nourished  A male individual who appears robust   HENT:   Head: Normocephalic and atraumatic  Eyes: Pupils are equal, round, and reactive to light  Neck: Normal range of motion  Neck supple  No tracheal deviation present  No thyromegaly present  Cardiovascular: Normal rate, regular rhythm and normal heart sounds  Exam reveals no gallop  No murmur heard  Pulmonary/Chest: Effort normal  No respiratory distress  He has no wheezes  He has no rales  Abdominal: Soft  Bowel sounds are normal  There is no tenderness  Musculoskeletal: Normal range of motion  He exhibits no tenderness or deformity  Neurological: He is alert and oriented to person, place, and time  He has normal reflexes  Coordination normal    Skin: Skin is warm and dry  Psychiatric: He has a normal mood and affect

## 2019-09-27 ENCOUNTER — TELEPHONE (OUTPATIENT)
Dept: INTERNAL MEDICINE CLINIC | Facility: CLINIC | Age: 44
End: 2019-09-27

## 2019-09-27 NOTE — TELEPHONE ENCOUNTER
Excellent blood sugar with hemoglobin A1c 5 3    Same pattern of slight elevated alkaline phosphatase which has been noted in the past   Most likely due to fatty  liver

## 2019-10-03 DIAGNOSIS — R74.01 TRANSAMINITIS: Primary | ICD-10-CM

## 2019-10-03 DIAGNOSIS — E55.9 VITAMIN D DEFICIENCY: ICD-10-CM

## 2019-11-07 ENCOUNTER — OFFICE VISIT (OUTPATIENT)
Dept: GASTROENTEROLOGY | Facility: CLINIC | Age: 44
End: 2019-11-07
Payer: COMMERCIAL

## 2019-11-07 ENCOUNTER — APPOINTMENT (OUTPATIENT)
Dept: LAB | Facility: HOSPITAL | Age: 44
End: 2019-11-07
Attending: INTERNAL MEDICINE
Payer: COMMERCIAL

## 2019-11-07 VITALS
HEART RATE: 62 BPM | WEIGHT: 244.8 LBS | HEIGHT: 74 IN | SYSTOLIC BLOOD PRESSURE: 158 MMHG | BODY MASS INDEX: 31.42 KG/M2 | DIASTOLIC BLOOD PRESSURE: 76 MMHG

## 2019-11-07 DIAGNOSIS — R74.8 ALKALINE PHOSPHATASE ELEVATION: ICD-10-CM

## 2019-11-07 DIAGNOSIS — R74.01 TRANSAMINITIS: ICD-10-CM

## 2019-11-07 DIAGNOSIS — R74.8 ALKALINE PHOSPHATASE ELEVATION: Primary | ICD-10-CM

## 2019-11-07 LAB
FERRITIN SERPL-MCNC: 194 NG/ML (ref 8–388)
HBV CORE AB SER QL: NORMAL
HBV CORE IGM SER QL: NORMAL
HBV SURFACE AG SER QL: NORMAL
HCV AB SER QL: NORMAL
IRON SATN MFR SERPL: 31 %
IRON SERPL-MCNC: 89 UG/DL (ref 65–175)
TIBC SERPL-MCNC: 291 UG/DL (ref 250–450)

## 2019-11-07 PROCEDURE — 99244 OFF/OP CNSLTJ NEW/EST MOD 40: CPT | Performed by: INTERNAL MEDICINE

## 2019-11-07 PROCEDURE — 86704 HEP B CORE ANTIBODY TOTAL: CPT

## 2019-11-07 PROCEDURE — 86705 HEP B CORE ANTIBODY IGM: CPT

## 2019-11-07 PROCEDURE — 83550 IRON BINDING TEST: CPT

## 2019-11-07 PROCEDURE — 36415 COLL VENOUS BLD VENIPUNCTURE: CPT

## 2019-11-07 PROCEDURE — 87340 HEPATITIS B SURFACE AG IA: CPT

## 2019-11-07 PROCEDURE — 86235 NUCLEAR ANTIGEN ANTIBODY: CPT

## 2019-11-07 PROCEDURE — 82728 ASSAY OF FERRITIN: CPT

## 2019-11-07 PROCEDURE — 83516 IMMUNOASSAY NONANTIBODY: CPT

## 2019-11-07 PROCEDURE — 86038 ANTINUCLEAR ANTIBODIES: CPT

## 2019-11-07 PROCEDURE — 86803 HEPATITIS C AB TEST: CPT

## 2019-11-07 PROCEDURE — 83540 ASSAY OF IRON: CPT

## 2019-11-07 NOTE — PROGRESS NOTES
Consultation - 126 [facility]  Herb Baltazar 1975 53 y o  male     ASSESSMENT @ PLAN:   He is a 78-year-old male with persistent alkaline phosphatase elevation since 2016 in the 130s  He was diagnosed with fatty liver disease and then he lost 80 pounds through diet and exercise- his ALT function did normalize but the alkaline phosphatase is stayed elevated  His AMA was negative and his ultrasound was negative  1 will finish the liver lab evaluation    Chief Complaint:   Elevated alkaline phosphatase her abnormal LFTs    HPI:   He is a 78-year-old male with abnormal liver function tests  In 2016 he had an elevated ALT and alkaline phosphatase of 139  He in 2017 had an alkaline phosphatase of 134 he will then was told that he had diabetes or prediabetes and he lost 80 pounds through diet and exercise  Even with his weight loss and being very in shape his alkaline phosphatase is remain elevated  In August of 2018 was 144 in March of 2019 was 123 and currently it is 137  His AST and ALT are both normal   The patient denies alcohol use  He has no autoimmune disease  He reports that he takes numerous or herbal medications such as tumeric, beta carotene, protein powder cocoa butter and create teen  He has been on these enzymes for a year and was not on them for years  Nobody in the family has liver disease he has never been told he had a childhood liver problem  He does not know if you was tested for hepatitis a B or C     REVIEW OF SYSTEMS:     CONSTITUTIONAL: Denies any fever, chills, or rigors  Good appetite, and no recent weight loss  HEENT: No earache or tinnitus  Denies hearing loss or visual disturbances  CARDIOVASCULAR: No chest pain or palpitations  RESPIRATORY: Denies any cough, hemoptysis, shortness of breath or dyspnea on exertion  GASTROINTESTINAL: As noted in the History of Present Illness  GENITOURINARY: No problems with urination   Denies any hematuria or dysuria  NEUROLOGIC: No dizziness or vertigo, denies headaches  MUSCULOSKELETAL: Denies any muscle or joint pain  SKIN: Denies skin rashes or itching  ENDOCRINE: Denies excessive thirst  Denies intolerance to heat or cold  PSYCHOSOCIAL: Denies depression or anxiety  Denies any recent memory loss       Past Medical History:   Diagnosis Date    Hyperlipidemia     Obesity       Past Surgical History:   Procedure Laterality Date    INCISION AND DRAINAGE OF WOUND Right 2017    Procedure: INCISION AND DRAINAGE (I&D) BUTTOCK;  Surgeon: Melodie Engel MD;  Location: [facility];  Service: General    VASECTOMY       Social History     Socioeconomic History    Marital status: /Civil Union     Spouse name: Not on file    Number of children: Not on file    Years of education: Not on file    Highest education level: Not on file   Occupational History    Not on file   Social Needs    Financial resource strain: Not on file    Food insecurity:     Worry: Not on file     Inability: Not on file    Transportation needs:     Medical: Not on file     Non-medical: Not on file   Tobacco Use    Smoking status: Former Smoker     Packs/day: 2 00     Years: 20 00     Pack years: 40 00     Last attempt to quit: 2010     Years since quittin 8    Smokeless tobacco: Never Used   Substance and Sexual Activity    Alcohol use: Yes     Comment: occasionally    Drug use: No    Sexual activity: Not on file   Lifestyle    Physical activity:     Days per week: Not on file     Minutes per session: Not on file    Stress: Not on file   Relationships    Social connections:     Talks on phone: Not on file     Gets together: Not on file     Attends Jew service: Not on file     Active member of club or organization: Not on file     Attends meetings of clubs or organizations: Not on file     Relationship status: Not on file    Intimate partner violence:     Fear of current or ex partner: Not on file Emotionally abused: Not on file     Physically abused: Not on file     Forced sexual activity: Not on file   Other Topics Concern    Not on file   Social History Narrative        Employed in professional specialty position     Family History   Problem Relation Age of Onset    Diabetes Mother     Thyroid disease Mother     Hypertension Father      Loratadine and Metformin  Current Outpatient Medications   Medication Sig Dispense Refill    ADVOCATE LANCETS MISC by Does not apply route daily Patient Tests Once a Day  DX: E11 9 100 each 5    cholecalciferol (VITAMIN D3) 1,000 units tablet Take 1,000 Units by mouth daily      glucose blood test strip 1 each by Other route daily Patient Tests Once a Day  DX: E11 9 100 each 5    multivitamin (THERAGRAN) TABS Take 1 tablet by mouth daily      ibuprofen (MOTRIN) 200 mg tablet Take by mouth every 6 (six) hours as needed for mild pain       No current facility-administered medications for this visit  Blood pressure 158/76, pulse 62, height 6' 2" (1 88 m), weight 111 kg (244 lb 12 8 oz)  PHYSICAL EXAM:     General Appearance:   Alert, cooperative, no distress, appears stated age    HEENT:   Normocephalic, atraumatic, anicteric      Neck:  Supple, symmetrical, trachea midline, no adenopathy;    thyroid: no enlargement/tenderness/nodules; no carotid  bruit or JVD    Lungs:   Clear to auscultation bilaterally; no rales, rhonchi or wheezing; respirations unlabored    Heart[de-identified]   S1 and S2 normal; regular rate and rhythm; no murmur, rub, or gallop     Abdomen:   Soft, non-tender, non-distended; normal bowel sounds; no masses, no organomegaly    Genitalia:   Deferred    Rectal:   Deferred    Extremities:  No cyanosis, clubbing or edema    Pulses:  2+ and symmetric all extremities    Skin:  Skin color, texture, turgor normal, no rashes or lesions    Lymph nodes:  No palpable cervical, axillary or inguinal lymphadenopathy        Lab Results   Component Value Date WBC 3 74 (L) 03/06/2019    HGB 13 5 03/06/2019    HCT 41 9 03/06/2019    MCV 91 03/06/2019     03/06/2019     Lab Results   Component Value Date    GLUCOSE 141 (H) 09/23/2015    CALCIUM 8 8 09/19/2019     09/23/2015    K 4 0 09/19/2019    CO2 30 09/19/2019     09/19/2019    BUN 22 09/19/2019    CREATININE 1 03 09/19/2019     Lab Results   Component Value Date    ALT 53 09/19/2019    AST 51 (H) 09/19/2019    ALKPHOS 137 (H) 09/19/2019    BILITOT 0 52 09/23/2015     Lab Results   Component Value Date    INR 1 09 09/07/2017    PROTIME 14 4 09/07/2017

## 2019-11-08 LAB
ACTIN IGG SERPL-ACNC: 12 UNITS (ref 0–19)
RYE IGE QN: NEGATIVE
TTG IGG SER-ACNC: 2 U/ML (ref 0–5)

## 2019-11-25 ENCOUNTER — TELEPHONE (OUTPATIENT)
Dept: GASTROENTEROLOGY | Facility: CLINIC | Age: 44
End: 2019-11-25

## 2019-11-25 NOTE — TELEPHONE ENCOUNTER
Karthikeyan Torres pt - Pt called to inquire about his lab results from 3 weeks ago, he never got a call with results  Please call [phone] ty

## 2019-11-25 NOTE — TELEPHONE ENCOUNTER
The rest of his labwork was normal- no hepatitis or evidence of autoimmune inflammation of his liver

## 2020-02-06 NOTE — LETTER
November 8, 2018     Rupal Johansen MD  [address] [address]    Patient: Roosevelt Farnsworth   YOB: 1975   Date of Visit: 11/8/2018       Dear Dr Dre Wick: Thank you for referring Roosevelt Farnsworth to me for evaluation  Below are my notes for this consultation  If you have questions, please do not hesitate to call me  I look forward to following your patient along with you  Sincerely,        [employer], DO        CC: No Recipients  [employer], DO  11/8/2018  4:42 PM  Sign at close encounter  Assessment/Plan:  Assessment/Plan   Diagnoses and all orders for this visit:    Chronic right shoulder pain  -     XR shoulder 2+ vw right; Future  -     MRI arthrogram right shoulder; Future  -     FL injection right shoulder (arthrogram); Future  -     FL injection right shoulder (non arthrogram); Future    Clicking right shoulder  -     XR shoulder 2+ vw right; Future  -     MRI arthrogram right shoulder; Future  -     FL injection right shoulder (arthrogram); Future    Shoulder instability, right  -     XR shoulder 2+ vw right; Future  -     MRI arthrogram right shoulder; Future  -     FL injection right shoulder (arthrogram); Future    Glenohumeral arthritis, right        49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, clicking, and instability more than 1 year duration  Discussed with patient physical exam, radiographs, impression, and plan  X-ray the shoulder noted for glenohumeral joint degenerative changes  Physical exam is noted for mild tenderness of the upper trapezius  He demonstrates normal forward flexion and abduction however with pain and there is palpable clunking of the shoulder with range of motion testing  Internal rotation limited to the sacrum  He does have normal strength and there is positive Meagher's testing  Clinical impression is that he has labral pathology    Since he is now more than 1 year since onset and has failed conservative therapy in the form of oral anti-inflammatory and home exercise program I will refer him for MRI arthrogram of the shoulder to evaluate for soft tissue abnormality  I will also have him undergo fluoroscopic guided glenohumeral joint corticosteroid injection  He will follow up with me 3 weeks after injection for re-evaluation  Subjective:   Patient ID: José Luis Finley is a 37 y o  male  Chief Complaint   Patient presents with    Right Shoulder - Pain       42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 of right shoulder pain more than 1 year duration  He denies any one particular trauma or inciting event  Pain described as gradual onset, localized to "inside"the shoulder, sharp, intermittent, nonradiating, worse with movement of the arm, and associated with limited range of motion in reaching behind his back, and repetitive clicking and popping of the shoulder  He denies any associated numbness/tingling  Since onset of pain he has been taking ibuprofen, and he has also been continuing with his martial arts  He has also been doing a home exercise program which includes strengthening exercises of the upper body and shoulders  He does report pain that is worse with certain exercises which includes raising the arm above shoulder level and with doing pushups  Shoulder Pain   This is a chronic problem  The current episode started more than 1 year ago  The problem occurs intermittently  The problem has been unchanged  Associated symptoms include arthralgias  Pertinent negatives include no abdominal pain, chest pain, chills, fever, joint swelling, numbness, rash, sore throat or weakness  Exacerbated by: Arm elevation  He has tried rest and NSAIDs (Home exercise, strengthening) for the symptoms  The treatment provided no relief             The following portions of the patient's history were reviewed and updated as appropriate: He  has a past medical history of Diabetes mellitus ([facility] [address] ); Hyperlipidemia; and Obesity  He  has a past surgical history that includes Vasectomy; Incision and drainage of wound (Right, 9/7/2017); and Knee surgery  His family history includes Diabetes in his mother; Hypertension in his father; Thyroid disease in his mother  He  reports that he quit smoking about 8 years ago  He has never used smokeless tobacco  He reports that he drinks alcohol  He reports that he does not use drugs  He is allergic to loratadine and metformin       Review of Systems   Constitutional: Negative for chills and fever  HENT: Negative for sore throat  Eyes: Negative for visual disturbance  Respiratory: Negative for shortness of breath  Cardiovascular: Negative for chest pain  Gastrointestinal: Negative for abdominal pain  Genitourinary: Negative for flank pain  Musculoskeletal: Positive for arthralgias  Negative for joint swelling  Skin: Negative for rash and wound  Neurological: Negative for weakness and numbness  Hematological: Does not bruise/bleed easily  Psychiatric/Behavioral: Negative for self-injury  Objective:  Vitals:    11/08/18 1508   Weight: 110 kg (243 lb)   Height: 6' 3" (1 905 m)     Right Elbow Exam     Tenderness   The patient is experiencing no tenderness  Range of Motion   The patient has normal right elbow ROM  Muscle Strength   The patient has normal right elbow strength  Right Shoulder Exam     Tenderness   Right shoulder tenderness location: Trapezius  Range of Motion   Active Abduction: normal   Forward Flexion: normal   Internal Rotation 0 degrees: Sacrum     Muscle Strength   The patient has normal right shoulder strength  Comments:  Positive [place]'s  Negative belly press  Negative push-off            Physical Exam   Constitutional: He is oriented to person, place, and time  He appears well-developed  No distress  HENT:   Head: Normocephalic and atraumatic     Eyes: Conjunctivae are normal  Neck: No tracheal deviation present  Cardiovascular: Normal rate  Pulmonary/Chest: Effort normal  No respiratory distress  Abdominal: He exhibits no distension  Neurological: He is alert and oriented to person, place, and time  Skin: Skin is warm and dry  Psychiatric: He has a normal mood and affect  His behavior is normal    Nursing note and vitals reviewed  I have personally reviewed pertinent films in PACS and my interpretation is Glenohumeral joint arthritis  no

## 2020-03-25 ENCOUNTER — APPOINTMENT (OUTPATIENT)
Dept: LAB | Facility: CLINIC | Age: 45
End: 2020-03-25
Payer: COMMERCIAL

## 2020-03-25 ENCOUNTER — OFFICE VISIT (OUTPATIENT)
Dept: INTERNAL MEDICINE CLINIC | Facility: CLINIC | Age: 45
End: 2020-03-25
Payer: COMMERCIAL

## 2020-03-25 VITALS
SYSTOLIC BLOOD PRESSURE: 170 MMHG | HEART RATE: 75 BPM | BODY MASS INDEX: 30.59 KG/M2 | HEIGHT: 75 IN | TEMPERATURE: 98.3 F | DIASTOLIC BLOOD PRESSURE: 90 MMHG | WEIGHT: 246 LBS | OXYGEN SATURATION: 95 %

## 2020-03-25 DIAGNOSIS — I45.10 INCOMPLETE RIGHT BUNDLE BRANCH BLOCK: ICD-10-CM

## 2020-03-25 DIAGNOSIS — Z12.5 PROSTATE CANCER SCREENING: ICD-10-CM

## 2020-03-25 DIAGNOSIS — R74.8 ALKALINE PHOSPHATASE ELEVATION: ICD-10-CM

## 2020-03-25 DIAGNOSIS — R73.9 HYPERGLYCEMIA: ICD-10-CM

## 2020-03-25 DIAGNOSIS — E55.9 VITAMIN D DEFICIENCY: ICD-10-CM

## 2020-03-25 DIAGNOSIS — Z12.11 COLON CANCER SCREENING: ICD-10-CM

## 2020-03-25 DIAGNOSIS — K76.0 STEATOSIS OF LIVER: ICD-10-CM

## 2020-03-25 DIAGNOSIS — E78.2 MIXED HYPERLIPIDEMIA: ICD-10-CM

## 2020-03-25 DIAGNOSIS — Z11.4 ENCOUNTER FOR SCREENING FOR HIV: ICD-10-CM

## 2020-03-25 DIAGNOSIS — I51.7 ATHLETIC HEART SYNDROME: ICD-10-CM

## 2020-03-25 DIAGNOSIS — Z11.59 ENCOUNTER FOR HEPATITIS C SCREENING TEST FOR LOW RISK PATIENT: ICD-10-CM

## 2020-03-25 DIAGNOSIS — I10 ESSENTIAL HYPERTENSION: ICD-10-CM

## 2020-03-25 DIAGNOSIS — I10 ESSENTIAL HYPERTENSION: Primary | ICD-10-CM

## 2020-03-25 PROBLEM — E11.9 TYPE 2 DIABETES MELLITUS WITHOUT COMPLICATION (HCC): Chronic | Status: RESOLVED | Noted: 2017-09-07 | Resolved: 2020-03-25

## 2020-03-25 LAB
25(OH)D3 SERPL-MCNC: 28 NG/ML (ref 30–100)
ALBUMIN SERPL BCP-MCNC: 4 G/DL (ref 3.5–5)
ALP SERPL-CCNC: 126 U/L (ref 46–116)
ALT SERPL W P-5'-P-CCNC: 52 U/L (ref 12–78)
ANION GAP SERPL CALCULATED.3IONS-SCNC: 3 MMOL/L (ref 4–13)
AST SERPL W P-5'-P-CCNC: 27 U/L (ref 5–45)
BASOPHILS # BLD AUTO: 0.03 THOUSANDS/ΜL (ref 0–0.1)
BASOPHILS NFR BLD AUTO: 1 % (ref 0–1)
BILIRUB SERPL-MCNC: 0.45 MG/DL (ref 0.2–1)
BILIRUB UR QL STRIP: NEGATIVE
BUN SERPL-MCNC: 20 MG/DL (ref 5–25)
CALCIUM SERPL-MCNC: 8.9 MG/DL (ref 8.3–10.1)
CHLORIDE SERPL-SCNC: 106 MMOL/L (ref 100–108)
CHOLEST SERPL-MCNC: 155 MG/DL (ref 50–200)
CLARITY UR: CLEAR
CO2 SERPL-SCNC: 33 MMOL/L (ref 21–32)
COLOR UR: YELLOW
CREAT SERPL-MCNC: 0.85 MG/DL (ref 0.6–1.3)
CREAT UR-MCNC: 174 MG/DL
EOSINOPHIL # BLD AUTO: 0.1 THOUSAND/ΜL (ref 0–0.61)
EOSINOPHIL NFR BLD AUTO: 3 % (ref 0–6)
ERYTHROCYTE [DISTWIDTH] IN BLOOD BY AUTOMATED COUNT: 13.1 % (ref 11.6–15.1)
EST. AVERAGE GLUCOSE BLD GHB EST-MCNC: 105 MG/DL
GFR SERPL CREATININE-BSD FRML MDRD: 104 ML/MIN/1.73SQ M
GLUCOSE P FAST SERPL-MCNC: 101 MG/DL (ref 65–99)
GLUCOSE UR STRIP-MCNC: NEGATIVE MG/DL
HBA1C MFR BLD: 5.3 %
HCT VFR BLD AUTO: 44.2 % (ref 36.5–49.3)
HCV AB SER QL: NORMAL
HDLC SERPL-MCNC: 57 MG/DL
HGB BLD-MCNC: 14.1 G/DL (ref 12–17)
HGB UR QL STRIP.AUTO: NEGATIVE
IMM GRANULOCYTES # BLD AUTO: 0.02 THOUSAND/UL (ref 0–0.2)
IMM GRANULOCYTES NFR BLD AUTO: 1 % (ref 0–2)
KETONES UR STRIP-MCNC: NEGATIVE MG/DL
LDLC SERPL CALC-MCNC: 71 MG/DL (ref 0–100)
LEUKOCYTE ESTERASE UR QL STRIP: NEGATIVE
LYMPHOCYTES # BLD AUTO: 1.09 THOUSANDS/ΜL (ref 0.6–4.47)
LYMPHOCYTES NFR BLD AUTO: 29 % (ref 14–44)
MCH RBC QN AUTO: 28.9 PG (ref 26.8–34.3)
MCHC RBC AUTO-ENTMCNC: 31.9 G/DL (ref 31.4–37.4)
MCV RBC AUTO: 91 FL (ref 82–98)
MICROALBUMIN UR-MCNC: 8.9 MG/L (ref 0–20)
MICROALBUMIN/CREAT 24H UR: 5 MG/G CREATININE (ref 0–30)
MONOCYTES # BLD AUTO: 0.41 THOUSAND/ΜL (ref 0.17–1.22)
MONOCYTES NFR BLD AUTO: 11 % (ref 4–12)
NEUTROPHILS # BLD AUTO: 2.14 THOUSANDS/ΜL (ref 1.85–7.62)
NEUTS SEG NFR BLD AUTO: 55 % (ref 43–75)
NITRITE UR QL STRIP: NEGATIVE
NRBC BLD AUTO-RTO: 0 /100 WBCS
PH UR STRIP.AUTO: 6.5 [PH]
PLATELET # BLD AUTO: 203 THOUSANDS/UL (ref 149–390)
PMV BLD AUTO: 11.4 FL (ref 8.9–12.7)
POTASSIUM SERPL-SCNC: 4 MMOL/L (ref 3.5–5.3)
PROT SERPL-MCNC: 7.4 G/DL (ref 6.4–8.2)
PROT UR STRIP-MCNC: NEGATIVE MG/DL
PSA SERPL-MCNC: 0.5 NG/ML (ref 0–4)
RBC # BLD AUTO: 4.88 MILLION/UL (ref 3.88–5.62)
SODIUM SERPL-SCNC: 142 MMOL/L (ref 136–145)
SP GR UR STRIP.AUTO: 1.02 (ref 1–1.03)
TRIGL SERPL-MCNC: 134 MG/DL
TSH SERPL DL<=0.05 MIU/L-ACNC: 1.16 UIU/ML (ref 0.36–3.74)
UROBILINOGEN UR QL STRIP.AUTO: 0.2 E.U./DL
WBC # BLD AUTO: 3.79 THOUSAND/UL (ref 4.31–10.16)

## 2020-03-25 PROCEDURE — 1036F TOBACCO NON-USER: CPT | Performed by: INTERNAL MEDICINE

## 2020-03-25 PROCEDURE — 2022F DILAT RTA XM EVC RTNOPTHY: CPT | Performed by: INTERNAL MEDICINE

## 2020-03-25 PROCEDURE — 82306 VITAMIN D 25 HYDROXY: CPT

## 2020-03-25 PROCEDURE — 3008F BODY MASS INDEX DOCD: CPT | Performed by: INTERNAL MEDICINE

## 2020-03-25 PROCEDURE — 84443 ASSAY THYROID STIM HORMONE: CPT

## 2020-03-25 PROCEDURE — 81003 URINALYSIS AUTO W/O SCOPE: CPT | Performed by: INTERNAL MEDICINE

## 2020-03-25 PROCEDURE — G0103 PSA SCREENING: HCPCS

## 2020-03-25 PROCEDURE — 83036 HEMOGLOBIN GLYCOSYLATED A1C: CPT

## 2020-03-25 PROCEDURE — 80061 LIPID PANEL: CPT

## 2020-03-25 PROCEDURE — 93000 ELECTROCARDIOGRAM COMPLETE: CPT | Performed by: INTERNAL MEDICINE

## 2020-03-25 PROCEDURE — 99214 OFFICE O/P EST MOD 30 MIN: CPT | Performed by: INTERNAL MEDICINE

## 2020-03-25 PROCEDURE — 87389 HIV-1 AG W/HIV-1&-2 AB AG IA: CPT

## 2020-03-25 PROCEDURE — 3044F HG A1C LEVEL LT 7.0%: CPT | Performed by: INTERNAL MEDICINE

## 2020-03-25 PROCEDURE — 82570 ASSAY OF URINE CREATININE: CPT | Performed by: INTERNAL MEDICINE

## 2020-03-25 PROCEDURE — 3077F SYST BP >= 140 MM HG: CPT | Performed by: INTERNAL MEDICINE

## 2020-03-25 PROCEDURE — 82043 UR ALBUMIN QUANTITATIVE: CPT | Performed by: INTERNAL MEDICINE

## 2020-03-25 PROCEDURE — 85025 COMPLETE CBC W/AUTO DIFF WBC: CPT

## 2020-03-25 PROCEDURE — 36415 COLL VENOUS BLD VENIPUNCTURE: CPT

## 2020-03-25 PROCEDURE — 80053 COMPREHEN METABOLIC PANEL: CPT

## 2020-03-25 PROCEDURE — 86803 HEPATITIS C AB TEST: CPT

## 2020-03-25 PROCEDURE — 3080F DIAST BP >= 90 MM HG: CPT | Performed by: INTERNAL MEDICINE

## 2020-03-25 PROCEDURE — 3061F NEG MICROALBUMINURIA REV: CPT | Performed by: INTERNAL MEDICINE

## 2020-03-25 NOTE — PATIENT INSTRUCTIONS
A patient with the above history, elevated blood pressure, a normal exam, and an abnormal EKG  Recommendations include home blood pressure monitoring, slow restriction, laboratory testing, and echocardiography with follow-up in 4-6 weeks  The goal will be to see whether not blood pressure needs treatment with medication      DASH Eating Plan   WHAT YOU NEED TO KNOW:   The DASH (Dietary Approaches to Stop Hypertension) Eating Plan is designed to help prevent or lower high blood pressure  It can also help to lower LDL (bad) cholesterol and decrease your risk of heart disease  The plan is low in sodium, sugar, unhealthy fats, and total fat  It is high in potassium, calcium, magnesium, and fiber  These nutrients are added when you eat more fruits, vegetables, and whole grains  DISCHARGE INSTRUCTIONS:   Your sodium limit each day: Your dietitian will tell you how much sodium is safe for you to have each day  People with high blood pressure should have no more than 1,500 to 2,300 mg of sodium in a day  A teaspoon (tsp) of salt has 2,300 mg of sodium  This may seem like a difficult goal, but small changes to the foods you eat can make a big difference  Your healthcare provider or dietitian can help you create a meal plan that follows your sodium limit  How to limit sodium:   · Read food labels  Food labels can help you choose foods that are low in sodium  The amount of sodium is listed in milligrams (mg)  The % Daily Value (DV) column tells you how much of your daily needs are met by 1 serving of the food for each nutrient listed  Choose foods that have less than 5% of the DV of sodium  These foods are considered low in sodium  Foods that have 20% or more of the DV of sodium are considered high in sodium  Avoid foods that have more than 300 mg of sodium in each serving  Choose foods that say low-sodium, reduced-sodium, or no salt added on the food label  · Avoid salt    Do not salt food at the table, and add very little salt to foods during cooking  Use herbs and spices, such as onions, garlic, and salt-free seasonings to add flavor to foods  Try lemon or lime juice or vinegar to give foods a tart flavor  Use hot peppers or a small amount of hot pepper sauce to add a spicy flavor to foods  · Ask about salt substitutes  Ask your healthcare provider if you may use salt substitutes  Some salt substitutes have ingredients that can be harmful if you have certain health conditions  · Choose foods carefully at restaurants  Meals from restaurants, especially fast food restaurants, are often high in sodium  Some restaurants have nutrition information that tells you the amount of sodium in their foods  Ask to have your food prepared with less, or no salt  What you need to know about fats:   · Include healthy fats  Examples are unsaturated fats and omega-3 fatty acids  Unsaturated fats are found in soybean, canola, olive, or sunflower oil, and liquid and soft tub margarines  Omega-3 fatty acids are found in fatty fish, such as salmon, tuna, mackerel, and sardines  It is also found in flaxseed oil and ground flaxseed  · Avoid unhealthy fats  Do not eat unhealthy fats, such as saturated fats and trans fats  Saturated fats are found in foods that contain fat from animals  Examples are fatty meats, whole milk, butter, cream, and other dairy foods  It is also found in shortening, stick margarine, palm oil, and coconut oil  Trans fats are found in fried foods, crackers, chips, and baked goods made with margarine or shortening  Foods to include: With the DASH eating plan, you need to eat a certain number of servings from each food group  This will help you get enough of certain nutrients and limit others  The amount of servings you should eat depends on how many calories you need  Your dietitian can tell you how many calories you need   The number of servings listed next to the food groups below are for people who need about 2,000 calories each day    · Grains:  6 to 8 servings (3 of these servings should be whole-grain foods)    ¨ 1 slice of whole-grain bread     ¨ 1 ounce of dry cereal    ¨ ½ cup of cooked cereal, pasta, or brown rice    · Vegetables and fruits:  4 to 5 servings of fruits and 4 to 5 servings of vegetables    ¨ 1 medium fruit    ¨ ½ cup of frozen, canned (no added salt), or chopped fresh vegetables     ¨ ½ cup of fresh, frozen, dried, or canned fruit (canned in light syrup or fruit juice)    ¨ ½ cup of vegetable or fruit juice    · Dairy:  2 to 3 servings    ¨ 1 cup of nonfat (skim) or 1% milk    ¨ 1½ ounces of fat-free or low-fat cheese    ¨ 6 ounces of nonfat or low-fat yogurt    · Lean meat, poultry, and fish:  6 ounces or less    [employer] (chicken, turkey) with no skin    ¨ Fish (especially fatty fish, such as salmon, fresh tuna, or mackerel)    ¨ Lean beef and pork (loin, round, extra lean hamburger)    ¨ Egg whites and egg substitutes    · Nuts, seeds, and legumes:  4 to 5 servings each week    ¨ ½ cup of cooked beans and peas    ¨ 1½ ounces of unsalted nuts    ¨ 2 tablespoons of peanut butter or seeds    · Sweets and added sugars:  5 or less each week    ¨ 1 tablespoon of sugar, jelly, or jam    ¨ ½ cup of sorbet or gelatin    ¨ 1 cup of lemonade    · Fats:  2 to 3 servings each week    ¨ 1 teaspoon of soft margarine or vegetable oil    ¨ 1 tablespoon of mayonnaise    ¨ 2 tablespoons of salad dressing  Foods to avoid:   · Grains:      [employer], such as doughnuts, pastries, cookies, and biscuits (high in fat and sugar)    ¨ Mixes for cornbread and biscuits, packaged foods, such as bread stuffing, rice and pasta mixes, macaroni and cheese, and instant cereals (high in sodium)    · Fruits and vegetables:      ¨ Regular, canned vegetables (high in sodium)    ¨ Sauerkraut, pickled vegetables, and other foods prepared in brine (high in sodium)    ¨ Fried vegetables or vegetables in butter or high-fat sauces    ¨ Fruit in cream or butter sauce (high in fat)    · Dairy:      ¨ Whole milk, 2% milk, and cream (high in fat)    ¨ Regular cheese and processed cheese (high in fat and sodium)    · Meats and protein foods:      ¨ Smoked or cured meat, such as corned beef, puentes, ham, hot dogs, and sausage (high in fat and sodium)    ¨ Canned beans and canned meats or spreads, such as potted meats, sardines, anchovies, and imitation seafood (high in sodium)    ¨ Deli or lunch meats, such as bologna, ham, turkey, and roast beef (high in sodium)    ¨ High-fat meat (T-bone steak, regular hamburger, and ribs)    ¨ Whole eggs and egg yolks (high in fat)    · Other:      ¨ Seasonings made with salt, such as garlic salt, celery salt, onion salt, seasoned salt, meat tenderizers, and monosodium glutamate (MSG)    ¨ Miso soup and canned or dried soup mixes (high in sodium)    ¨ Regular soy sauce, barbecue sauce, teriyaki sauce, steak sauce, Worcestershire sauce, and most flavored vinegars (high in sodium)    ¨ Regular condiments, such as mustard, ketchup, and salad dressings (high in sodium)    ¨ Gravy and sauces, such as Km or cheese sauces (high in sodium and fat)    ¨ Drinks high in sugar, such as soda or fruit drinks    [employer] foods, such as salted chips, popcorn, pretzels, pork rinds, salted crackers, and salted nuts    ¨ Frozen foods, such as dinners, entrees, vegetables with sauces, and breaded meats (high in sodium)  Other guidelines to follow:   · Maintain a healthy weight  Your risk for heart disease is higher if you are overweight  Your healthcare provider may suggest that you lose weight if you are overweight  You can lose weight by eating fewer calories and foods that have added sugars and fat  The DASH meal plan can help you do this  Decrease calories by eating smaller portions at each meal and fewer snacks  Ask your healthcare provider for more information about how to lose weight  · Exercise regularly  Regular exercise can help you reach or maintain a healthy weight  Regular exercise can also help decrease your blood pressure and improve your cholesterol levels  Get 30 minutes or more of moderate exercise each day of the week  To lose weight, get at least 60 minutes of exercise  Talk to your healthcare provider about the best exercise program for you  · Limit alcohol  Women should limit alcohol to 1 drink a day  Men should limit alcohol to 2 drinks a day  A drink of alcohol is 12 ounces of beer, 5 ounces of wine, or 1½ ounces of liquor  © 2017 [facility] Information is for End User's use only and may not be sold, redistributed or otherwise used for commercial purposes  All illustrations and images included in CareNotes® are the copyrighted property of [employer]  or Davian Paris  The above information is an  only  It is not intended as medical advice for individual conditions or treatments  Talk to your doctor, nurse or pharmacist before following any medical regimen to see if it is safe and effective for you

## 2020-03-25 NOTE — PROGRESS NOTES
Diabetic Foot Exam    Patient's shoes and socks removed  Right Foot/Ankle   Right Foot Inspection  Skin Exam: skin intact                            Sensory       Monofilament testing: intact  Vascular    The right DP pulse is 2+  The right PT pulse is 2+  Left Foot/Ankle  Left Foot Inspection  Skin Exam: skin intact                                         Sensory       Monofilament: intact  Vascular    The left DP pulse is 2+  The left PT pulse is 2+  Assign Risk Category:  No deformity present; No loss of protective sensation;  No weak pulses       Risk: 0

## 2020-03-25 NOTE — PROGRESS NOTES
Assessment/Plan:       Diagnoses and all orders for this visit:    Essential hypertension  -     POCT ECG  -     CBC and differential; Future  -     Lipid Panel with Direct LDL reflex; Future  -     Comprehensive metabolic panel; Future  -     TSH, 3rd generation with Free T4 reflex; Future  -     UA (URINE) with reflex to Scope    Alkaline phosphatase elevation    Steatosis of liver    Mixed hyperlipidemia  -     Lipid Panel with Direct LDL reflex; Future    Hyperglycemia  -     Hemoglobin A1C; Future  -     Microalbumin / creatinine urine ratio    Prostate cancer screening  -     PSA, Total Screen; Future    Vitamin D deficiency  -     Vitamin D 25 hydroxy; Future    Colon cancer screening    Encounter for hepatitis C screening test for low risk patient  -     Hepatitis C antibody; Future    Encounter for screening for HIV  -     HIV 1/2 Antigen/Antibody (4th Generation) w Reflex [facility]; Future    Incomplete right bundle branch block  -     Echo complete with contrast if indicated; Future    Athletic heart syndrome  -     Echo complete with contrast if indicated; Future    Other orders  -     Omega-3 Fatty Acids (FISH OIL OMEGA-3 PO); Take by mouth      BMI Counseling: Body mass index is 30 75 kg/m²  The BMI is above normal  Nutrition recommendations include moderation in carbohydrate intake  Exercise recommendations include moderate physical activity 150 minutes/week  Subjective:      Patient ID: Blayne Acuna is a 50 y o  male  A  78-year-old man who 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  Formally in excess of 300 lb he is now down to  246  This represents a slight  increase over the past several months  The only symptoms orthopedic  He has had some ongoing right shoulder pain  Very active physically    The patient is change in dietary habits and change in exercise pattern was remarkably successful in reversing a number of metabolic derangements    In particular, he went from being frankly diabetic 2 at worst prediabetic  The most recent hemoglobin A1c was 5 7%    Even more remarkable was to dramatic drop in lipids  His cholesterol several years ago was 270 and recheck a couple of months ago was 104  His triglycerides were in excess of 400 and now 82    The 1 thing that in changes alk-phos elevation  This is persisted  This was fractionated and the very low elevation of 124 persisted with normal fractionation levels  Immunoglobulin profile was normal   Serum protein electrophoresis was normal   Anti mitochondrial antibody was normal   GI consultation was obtained and there is no sign of any infectious or autoimmune disease  We can still attribute this to fatty liver  The blood pressure had been at a prehypertensive range for years but today it is clearly high  The following portions of the patient's history were reviewed and updated as appropriate:   He has a past medical history of Hyperlipidemia and Obesity  ,  does not have any pertinent problems on file  ,   has a past surgical history that includes Vasectomy and Incision and drainage of wound (Right, 9/7/2017)  ,  family history includes Diabetes in his mother; Hypertension in his father; Thyroid disease in his mother  ,   reports that he quit smoking about 10 years ago  He has a 40 00 pack-year smoking history  He has never used smokeless tobacco  He reports that he drinks alcohol  He reports that he does not use drugs  ,  is allergic to loratadine and metformin     Current Outpatient Medications   Medication Sig Dispense Refill    ADVOCATE LANCETS MISC by Does not apply route daily Patient Tests Once a Day  DX: E11 9 100 each 5    cholecalciferol (VITAMIN D3) 1,000 units tablet Take 1,000 Units by mouth daily      glucose blood test strip 1 each by Other route daily Patient Tests Once a Day   DX: E11 9 100 each 5    multivitamin (THERAGRAN) TABS Take 1 tablet by mouth daily      Omega-3 Fatty Acids (FISH OIL OMEGA-3 PO) Take by mouth      ibuprofen (MOTRIN) 200 mg tablet Take by mouth every 6 (six) hours as needed for mild pain       No current facility-administered medications for this visit  Review of Systems      Objective:  Vitals:    03/25/20 0932   BP: 170/90   Pulse: 75   Temp: 98 3 °F (36 8 °C)   SpO2: 95%      Physical Exam      Patient Instructions      A patient with the above history, elevated blood pressure, a normal exam, and an abnormal EKG  Recommendations include home blood pressure monitoring, slow restriction, laboratory testing, and echocardiography with follow-up in 4-6 weeks  The goal will be to see whether not blood pressure needs treatment with medication      DASH Eating Plan   WHAT YOU NEED TO KNOW:   The DASH (Dietary Approaches to Stop Hypertension) Eating Plan is designed to help prevent or lower high blood pressure  It can also help to lower LDL (bad) cholesterol and decrease your risk of heart disease  The plan is low in sodium, sugar, unhealthy fats, and total fat  It is high in potassium, calcium, magnesium, and fiber  These nutrients are added when you eat more fruits, vegetables, and whole grains  DISCHARGE INSTRUCTIONS:   Your sodium limit each day: Your dietitian will tell you how much sodium is safe for you to have each day  People with high blood pressure should have no more than 1,500 to 2,300 mg of sodium in a day  A teaspoon (tsp) of salt has 2,300 mg of sodium  This may seem like a difficult goal, but small changes to the foods you eat can make a big difference  Your healthcare provider or dietitian can help you create a meal plan that follows your sodium limit  How to limit sodium:   · Read food labels  Food labels can help you choose foods that are low in sodium  The amount of sodium is listed in milligrams (mg)  The % Daily Value (DV) column tells you how much of your daily needs are met by 1 serving of the food for each nutrient listed   Choose foods that have less than 5% of the DV of sodium  These foods are considered low in sodium  Foods that have 20% or more of the DV of sodium are considered high in sodium  Avoid foods that have more than 300 mg of sodium in each serving  Choose foods that say low-sodium, reduced-sodium, or no salt added on the food label  · Avoid salt  Do not salt food at the table, and add very little salt to foods during cooking  Use herbs and spices, such as onions, garlic, and salt-free seasonings to add flavor to foods  Try lemon or lime juice or vinegar to give foods a tart flavor  Use hot peppers or a small amount of hot pepper sauce to add a spicy flavor to foods  · Ask about salt substitutes  Ask your healthcare provider if you may use salt substitutes  Some salt substitutes have ingredients that can be harmful if you have certain health conditions  · Choose foods carefully at restaurants  Meals from restaurants, especially fast food restaurants, are often high in sodium  Some restaurants have nutrition information that tells you the amount of sodium in their foods  Ask to have your food prepared with less, or no salt  What you need to know about fats:   · Include healthy fats  Examples are unsaturated fats and omega-3 fatty acids  Unsaturated fats are found in soybean, canola, olive, or sunflower oil, and liquid and soft tub margarines  Omega-3 fatty acids are found in fatty fish, such as salmon, tuna, mackerel, and sardines  It is also found in flaxseed oil and ground flaxseed  · Avoid unhealthy fats  Do not eat unhealthy fats, such as saturated fats and trans fats  Saturated fats are found in foods that contain fat from animals  Examples are fatty meats, whole milk, butter, cream, and other dairy foods  It is also found in shortening, stick margarine, palm oil, and coconut oil  Trans fats are found in fried foods, crackers, chips, and baked goods made with margarine or shortening  Foods to include:   With the DASH eating plan, you need to eat a certain number of servings from each food group  This will help you get enough of certain nutrients and limit others  The amount of servings you should eat depends on how many calories you need  Your dietitian can tell you how many calories you need  The number of servings listed next to the food groups below are for people who need about 2,000 calories each day    · Grains:  6 to 8 servings (3 of these servings should be whole-grain foods)    ¨ 1 slice of whole-grain bread     ¨ 1 ounce of dry cereal    ¨ ½ cup of cooked cereal, pasta, or brown rice    · Vegetables and fruits:  4 to 5 servings of fruits and 4 to 5 servings of vegetables    ¨ 1 medium fruit    ¨ ½ cup of frozen, canned (no added salt), or chopped fresh vegetables     ¨ ½ cup of fresh, frozen, dried, or canned fruit (canned in light syrup or fruit juice)    ¨ ½ cup of vegetable or fruit juice    · Dairy:  2 to 3 servings    ¨ 1 cup of nonfat (skim) or 1% milk    ¨ 1½ ounces of fat-free or low-fat cheese    ¨ 6 ounces of nonfat or low-fat yogurt    · Lean meat, poultry, and fish:  6 ounces or less    [employer] (chicken, turkey) with no skin    ¨ Fish (especially fatty fish, such as salmon, fresh tuna, or mackerel)    ¨ Lean beef and pork (loin, round, extra lean hamburger)    ¨ Egg whites and egg substitutes    · Nuts, seeds, and legumes:  4 to 5 servings each week    ¨ ½ cup of cooked beans and peas    ¨ 1½ ounces of unsalted nuts    ¨ 2 tablespoons of peanut butter or seeds    · Sweets and added sugars:  5 or less each week    ¨ 1 tablespoon of sugar, jelly, or jam    ¨ ½ cup of sorbet or gelatin    ¨ 1 cup of lemonade    · Fats:  2 to 3 servings each week    ¨ 1 teaspoon of soft margarine or vegetable oil    ¨ 1 tablespoon of mayonnaise    ¨ 2 tablespoons of salad dressing  Foods to avoid:   · Grains:      [employer], such as doughnuts, pastries, cookies, and biscuits (high in fat and sugar)    ¨ Mixes for cornbread and biscuits, packaged foods, such as bread stuffing, rice and pasta mixes, macaroni and cheese, and instant cereals (high in sodium)    · Fruits and vegetables:      ¨ Regular, canned vegetables (high in sodium)    ¨ Sauerkraut, pickled vegetables, and other foods prepared in brine (high in sodium)    ¨ Fried vegetables or vegetables in butter or high-fat sauces    ¨ Fruit in cream or butter sauce (high in fat)    · Dairy:      ¨ Whole milk, 2% milk, and cream (high in fat)    ¨ Regular cheese and processed cheese (high in fat and sodium)    · Meats and protein foods:      ¨ Smoked or cured meat, such as corned beef, puentes, ham, hot dogs, and sausage (high in fat and sodium)    ¨ Canned beans and canned meats or spreads, such as potted meats, sardines, anchovies, and imitation seafood (high in sodium)    ¨ Deli or lunch meats, such as bologna, ham, turkey, and roast beef (high in sodium)    ¨ High-fat meat (T-bone steak, regular hamburger, and ribs)    ¨ Whole eggs and egg yolks (high in fat)    · Other:      ¨ Seasonings made with salt, such as garlic salt, celery salt, onion salt, seasoned salt, meat tenderizers, and monosodium glutamate (MSG)    ¨ Miso soup and canned or dried soup mixes (high in sodium)    ¨ Regular soy sauce, barbecue sauce, teriyaki sauce, steak sauce, Worcestershire sauce, and most flavored vinegars (high in sodium)    ¨ Regular condiments, such as mustard, ketchup, and salad dressings (high in sodium)    ¨ Gravy and sauces, such as Km or cheese sauces (high in sodium and fat)    ¨ Drinks high in sugar, such as soda or fruit drinks    [employer] foods, such as salted chips, popcorn, pretzels, pork rinds, salted crackers, and salted nuts    ¨ Frozen foods, such as dinners, entrees, vegetables with sauces, and breaded meats (high in sodium)  Other guidelines to follow:   · Maintain a healthy weight  Your risk for heart disease is higher if you are overweight   Your healthcare provider may suggest that you lose weight if you are overweight  You can lose weight by eating fewer calories and foods that have added sugars and fat  The DASH meal plan can help you do this  Decrease calories by eating smaller portions at each meal and fewer snacks  Ask your healthcare provider for more information about how to lose weight  · Exercise regularly  Regular exercise can help you reach or maintain a healthy weight  Regular exercise can also help decrease your blood pressure and improve your cholesterol levels  Get 30 minutes or more of moderate exercise each day of the week  To lose weight, get at least 60 minutes of exercise  Talk to your healthcare provider about the best exercise program for you  · Limit alcohol  Women should limit alcohol to 1 drink a day  Men should limit alcohol to 2 drinks a day  A drink of alcohol is 12 ounces of beer, 5 ounces of wine, or 1½ ounces of liquor  © 2017 [address] Information is for End User's use only and may not be sold, redistributed or otherwise used for commercial purposes  All illustrations and images included in CareNotes® are the copyrighted property of A D A M [employer] Inc  or [employer]ian [employer]  The above information is an  only  It is not intended as medical advice for individual conditions or treatments  Talk to your doctor, nurse or pharmacist before following any medical regimen to see if it is safe and effective for you

## 2020-03-26 ENCOUNTER — TELEPHONE (OUTPATIENT)
Dept: INTERNAL MEDICINE CLINIC | Facility: CLINIC | Age: 45
End: 2020-03-26

## 2020-03-26 LAB — HIV 1+2 AB+HIV1 P24 AG SERPL QL IA: NORMAL

## 2020-03-26 NOTE — TELEPHONE ENCOUNTER
----- Message from Jake López MD sent at 3/26/2020 11:05 AM EDT -----  Persistent slight elevation alkaline phosphatase  Also, low vitamin- D  Highly likely that the low vitamin-D is causing the other problem  Take vitamin-D 1000 unit capsules 2 capsules daily

## 2021-11-18 ENCOUNTER — TELEPHONE (OUTPATIENT)
Dept: OTHER | Facility: OTHER | Age: 46
End: 2021-11-18

## 2021-11-18 ENCOUNTER — OFFICE VISIT (OUTPATIENT)
Dept: INTERNAL MEDICINE CLINIC | Facility: CLINIC | Age: 46
End: 2021-11-18
Payer: COMMERCIAL

## 2021-11-18 VITALS
SYSTOLIC BLOOD PRESSURE: 132 MMHG | HEIGHT: 76 IN | BODY MASS INDEX: 30.86 KG/M2 | TEMPERATURE: 98.1 F | RESPIRATION RATE: 16 BRPM | OXYGEN SATURATION: 97 % | DIASTOLIC BLOOD PRESSURE: 84 MMHG | HEART RATE: 60 BPM | WEIGHT: 253.4 LBS

## 2021-11-18 DIAGNOSIS — H60.503 ACUTE OTITIS EXTERNA OF BOTH EARS, UNSPECIFIED TYPE: Primary | ICD-10-CM

## 2021-11-18 PROCEDURE — 99213 OFFICE O/P EST LOW 20 MIN: CPT | Performed by: NURSE PRACTITIONER

## 2021-11-18 PROCEDURE — 1036F TOBACCO NON-USER: CPT | Performed by: NURSE PRACTITIONER

## 2021-11-18 PROCEDURE — 3008F BODY MASS INDEX DOCD: CPT | Performed by: NURSE PRACTITIONER

## 2021-11-18 RX ORDER — OXYCODONE HYDROCHLORIDE AND ACETAMINOPHEN 5; 325 MG/1; MG/1
1 TABLET ORAL
COMMUNITY
End: 2021-11-18

## 2021-11-18 RX ORDER — CIPROFLOXACIN AND DEXAMETHASONE 3; 1 MG/ML; MG/ML
4 SUSPENSION/ DROPS AURICULAR (OTIC) 2 TIMES DAILY
Qty: 7.5 ML | Refills: 0 | Status: SHIPPED | OUTPATIENT
Start: 2021-11-18

## 2021-11-24 ENCOUNTER — NURSE TRIAGE (OUTPATIENT)
Dept: OTHER | Facility: OTHER | Age: 46
End: 2021-11-24

## 2022-05-26 ENCOUNTER — OFFICE VISIT (OUTPATIENT)
Dept: OBGYN CLINIC | Facility: CLINIC | Age: 47
End: 2022-05-26
Payer: COMMERCIAL

## 2022-05-26 ENCOUNTER — TELEPHONE (OUTPATIENT)
Dept: PAIN MEDICINE | Facility: CLINIC | Age: 47
End: 2022-05-26

## 2022-05-26 VITALS
HEIGHT: 75 IN | BODY MASS INDEX: 32.08 KG/M2 | DIASTOLIC BLOOD PRESSURE: 99 MMHG | WEIGHT: 258 LBS | HEART RATE: 51 BPM | SYSTOLIC BLOOD PRESSURE: 185 MMHG

## 2022-05-26 DIAGNOSIS — M25.512 BILATERAL SHOULDER PAIN, UNSPECIFIED CHRONICITY: Primary | ICD-10-CM

## 2022-05-26 DIAGNOSIS — M25.572 PAIN, JOINT, ANKLE AND FOOT, LEFT: ICD-10-CM

## 2022-05-26 DIAGNOSIS — M19.011 OSTEOARTHRITIS OF BILATERAL GLENOHUMERAL JOINTS: ICD-10-CM

## 2022-05-26 DIAGNOSIS — M19.012 OSTEOARTHRITIS OF BILATERAL GLENOHUMERAL JOINTS: ICD-10-CM

## 2022-05-26 DIAGNOSIS — M25.511 BILATERAL SHOULDER PAIN, UNSPECIFIED CHRONICITY: Primary | ICD-10-CM

## 2022-05-26 PROCEDURE — 3008F BODY MASS INDEX DOCD: CPT | Performed by: ORTHOPAEDIC SURGERY

## 2022-05-26 PROCEDURE — 99214 OFFICE O/P EST MOD 30 MIN: CPT | Performed by: ORTHOPAEDIC SURGERY

## 2022-05-26 PROCEDURE — 1036F TOBACCO NON-USER: CPT | Performed by: ORTHOPAEDIC SURGERY

## 2022-05-26 NOTE — PROGRESS NOTES
Patient Name:  Rashi Fisher  MRN:  [id number]    Assessment & Plan     1  Bilateral shoulder pain, unspecified chronicity    2  Osteoarthritis of bilateral glenohumeral joints  -     FL spine and pain procedure; Future; Expected date: 05/26/2022  -     Ambulatory Referral to Physical Therapy; Future    3  Pain, joint, ankle and foot, left      50 y o  male with bilateral shoulder osteoarthritis and left ankle pain  X-rays reviewed in office today with patient  In depth conversation had with patient regarding nonoperative vs surgical management of bilateral shoulder osteoarthritis  Nonoperative treatment consisting of outpatient PT to work on range of motion and strength, US guided glenohumeral corticosteroid injections provided at minimum of 3 months apart, OTC oral and topical analgesics  Surgical management of total shoulder arthroplasty  Discussed pre operative PT to obtain increased range of motion, intraoperative procedure, post op immobilization, outpatient PT, return to work/normal duties and back to the gym  Risks of surgical intervention discussed with patient including infection, failure of hardware from heavy lifting/activity, stiffness and decreased range of motion, fracture, need for additional procedure  At this time, patient would like to move forward with US guided bilateral glenohumeral corticosteroid injections with outpatient PT  In regards to Left ankle, discussed likely ankle sprain  Advised patient will be difficult to incorporate ankle PT in while working with bilateral shoulders  Can consider trial of PT after follow up appointment if pain persists or worsens  Advised patient to wear a sturdy sneaker with ambulation and working out  Verbalized understanding of the above and will follow up in 8 weeks for reevaluation of bilateral shoulder and Left ankle       Chief Complaint     Bilateral shoulder pain    History of the Present Illness     Rashi Fisher is a RHD 50 y o  male with bilateral shoulder pain since 2017  He recently started to get back into karate and working out in the gym  He also admits to subjective shoulder dislocations in the past secondary to playing sports  Patient admits to history of corticosteroid injections by orthopaedic surgeon in [facility]; provided a day of pain relief  He is asking about nonoperative vs surgical management and if these would help provided decreased pain and improved range of motion  Patient is very active and attempting to participate in a karate tournament in November  If surgical intervention warranted, patient wants to wait until next spring  Review of Systems     Review of Systems   Constitutional: Negative for chills and fever  HENT: Negative for ear pain and sore throat  Eyes: Negative for pain and visual disturbance  Respiratory: Negative for cough and shortness of breath  Cardiovascular: Negative for chest pain and palpitations  Gastrointestinal: Negative for abdominal pain and vomiting  Genitourinary: Negative for dysuria and hematuria  Musculoskeletal: Negative for arthralgias and back pain  Skin: Negative for color change and rash  Neurological: Negative for seizures and syncope  All other systems reviewed and are negative  Physical Exam     BP (!) 185/99   Pulse (!) 51   Ht 6' 3" (1 905 m)   Wt 117 kg (258 lb)   BMI 32 25 kg/m²     Right Shoulder: Active range of motion   120 degrees forward flexion  110 degrees abduction  50 degrees external rotation   L3 internal rotation      Passive range of motion   130 degrees of forward flexion   There is no tenderness present over the shoulder  There is 5/5 strength with external rotation testing at the side  Empty can testing is negative   Belly press test with   Mcbride test is positive  [place]'s test is negative    Speed's test is Negative  The patient is neurovascularly intact distally in the extremity  Left Shoulder:    Active range of motion   110 degrees forward flexion  100 degrees abduction  30 degrees external rotation   L4 internal rotation      Passive range of motion   110 degrees of forward flexion   There is no tenderness present over the shoulder  There is 5/5 strength with external rotation testing at the side  Empty can testing is negative   Belly press test elicits pain with strength well preserved  Mcbride test is positive  [place]'s test is negative    Speed's test is Negative  The patient is neurovascularly intact distally in the extremity  Left Ankle  Active range of motion  5 degrees of dorsiflexion  40 degrees plantar flexion  There is normal range of motion of toes in plantar flexion and dorsiflexion  There is mild swelling present over the ankle  There is tenderness present over the ATFL and CFL  There is pain with resisted eversion  Anterior drawer test is negative  Talar tilt test is negative  Sensation is intact to light touch superficial peroneal, deep peroneal, tibial, saphenous, and sural nerve distributions  2+ DP pulse present  Eyes:  Anicteric sclerae  Neck:  Supple  Lungs:  Normal respiratory effort  Cardiovascular:  Capillary refill is less than 2 seconds  Skin:  Intact without erythema  Neurologic:  Sensation grossly intact to light touch  Psychiatric:  Mood and affect are appropriate  Data Review     I have personally reviewed pertinent films in PACS, and my interpretation follows:    X-rays taken of Right shoulder demonstrates moderate to severe glenohumeral osteoarthritis with large inferior humeral head osteophyte  X-rays taken of Left shoulder demonstrates moderate to severe glenohumeral osteoarthritis with inferior humeral head osteophyte    X-rays taken of Left ankle demonstrates no acute fracture, dislocation, or significant degenerative change       Past Medical History:   Diagnosis Date    Hyperlipidemia     Obesity        Past Surgical History:   Procedure Laterality Date    INCISION AND DRAINAGE OF WOUND Right 2017    Procedure: INCISION AND DRAINAGE (I&D) BUTTOCK;  Surgeon: Lety Massey MD;  Location: [facility];  Service: General    VASECTOMY         Allergies   Allergen Reactions    Loratadine Hives    Metformin Itching       Current Outpatient Medications on File Prior to Visit   Medication Sig Dispense Refill    ascorbic acid (VITAMIN C) 1000 MG tablet Take 1 tablet by mouth daily      cholecalciferol (VITAMIN D3) 1,000 units tablet Take 1,000 Units by mouth daily      ibuprofen (MOTRIN) 200 mg tablet Take by mouth every 6 (six) hours as needed for mild pain      multivitamin (THERAGRAN) TABS Take 1 tablet by mouth daily      Omega-3 Fatty Acids (FISH OIL OMEGA-3 PO) Take by mouth      ADVOCATE LANCETS MISC by Does not apply route daily Patient Tests Once a Day  DX: E11 9 (Patient not taking: Reported on 2022) 100 each 5    ciprofloxacin-dexamethasone (CIPRODEX) otic suspension Administer 4 drops into both ears 2 (two) times a day (Patient not taking: No sig reported) 7 5 mL 0    glucose blood test strip 1 each by Other route daily Patient Tests Once a Day  DX: E11 9 (Patient not taking: Reported on 2022) 100 each 5     No current facility-administered medications on file prior to visit         Social History     Tobacco Use    Smoking status: Former Smoker     Packs/day: 2 00     Years: 20 00     Pack years: 40 00     Quit date:      Years since quittin 4    Smokeless tobacco: Never Used   Vaping Use    Vaping Use: Never used   Substance Use Topics    Alcohol use: Yes     Comment: occasionally    Drug use: No       Family History   Problem Relation Age of Onset    Diabetes Mother     Thyroid disease Mother     Hypertension Father              Procedures Performed     Procedures  None       Emy Bray DO

## 2022-05-26 NOTE — TELEPHONE ENCOUNTER
Pt saw Dr Job Engle 5/26/22, ok to schedule Injection of corticosteroid, lidocaine, marcaine of bilateral glenohumeral joints?

## 2022-05-26 NOTE — TELEPHONE ENCOUNTER
S/w pt and scheduled injection for 6/2/22 1015 am  Gave pre procedure instructions and /vaccine policy

## 2022-05-26 NOTE — TELEPHONE ENCOUNTER
Pt  Is on the phone he has a referral for a Injection of corticosteroid, lidocaine, marcaine of bilateral glenohumeral joints  Does Dr Jennifer Nguyễn do those without a consult? Please be advised thank you    Pt can be contacted @   [phone]

## 2022-06-02 ENCOUNTER — HOSPITAL ENCOUNTER (OUTPATIENT)
Dept: RADIOLOGY | Facility: CLINIC | Age: 47
Discharge: HOME | End: 2022-06-02
Attending: STUDENT IN AN ORGANIZED HEALTH CARE EDUCATION/TRAINING PROGRAM
Payer: COMMERCIAL

## 2022-06-02 VITALS
OXYGEN SATURATION: 100 % | RESPIRATION RATE: 18 BRPM | HEART RATE: 58 BPM | TEMPERATURE: 96.9 F | SYSTOLIC BLOOD PRESSURE: 161 MMHG | DIASTOLIC BLOOD PRESSURE: 93 MMHG

## 2022-06-02 DIAGNOSIS — M19.011 OSTEOARTHRITIS OF BILATERAL GLENOHUMERAL JOINTS: ICD-10-CM

## 2022-06-02 DIAGNOSIS — M19.012 OSTEOARTHRITIS OF BILATERAL GLENOHUMERAL JOINTS: ICD-10-CM

## 2022-06-02 PROCEDURE — 77002 NEEDLE LOCALIZATION BY XRAY: CPT

## 2022-06-02 PROCEDURE — 77002 NEEDLE LOCALIZATION BY XRAY: CPT | Performed by: STUDENT IN AN ORGANIZED HEALTH CARE EDUCATION/TRAINING PROGRAM

## 2022-06-02 PROCEDURE — A9585 GADOBUTROL INJECTION: HCPCS | Performed by: STUDENT IN AN ORGANIZED HEALTH CARE EDUCATION/TRAINING PROGRAM

## 2022-06-02 PROCEDURE — 20610 DRAIN/INJ JOINT/BURSA W/O US: CPT | Performed by: STUDENT IN AN ORGANIZED HEALTH CARE EDUCATION/TRAINING PROGRAM

## 2022-06-02 RX ORDER — METHYLPREDNISOLONE ACETATE 40 MG/ML
80 INJECTION, SUSPENSION INTRA-ARTICULAR; INTRALESIONAL; INTRAMUSCULAR; PARENTERAL; SOFT TISSUE ONCE
Status: COMPLETED | OUTPATIENT
Start: 2022-06-02 | End: 2022-06-02

## 2022-06-02 RX ORDER — BUPIVACAINE HCL/PF 2.5 MG/ML
8 VIAL (ML) INJECTION ONCE
Status: COMPLETED | OUTPATIENT
Start: 2022-06-02 | End: 2022-06-02

## 2022-06-02 RX ADMIN — Medication 8 ML: at 10:45

## 2022-06-02 RX ADMIN — METHYLPREDNISOLONE ACETATE 80 MG: 40 INJECTION, SUSPENSION INTRA-ARTICULAR; INTRALESIONAL; INTRAMUSCULAR; SOFT TISSUE at 10:45

## 2022-06-02 RX ADMIN — GADOBUTROL 1 ML: 604.72 INJECTION INTRAVENOUS at 10:45

## 2022-06-02 NOTE — DISCHARGE INSTR - LAB
Steroid Joint Injection   WHAT YOU NEED TO KNOW:   A steroid joint injection is a procedure to inject steroid medicine into a joint  Steroid medicine decreases pain and inflammation  The injection may also contain an anesthetic (numbing medicine) to decrease pain  It may be done to treat conditions such as arthritis, gout, or carpal tunnel syndrome  The injections may be given in your knee, ankle, shoulder, elbow, wrist, ankle or sacroiliac joint  Do not apply heat to any area that is numb  If you have discomfort or soreness at the injection site, you may apply ice today, 20 minutes on and 20 minutes off  Tomorrow you may use ice or warm, moist heat  Do not apply ice or heat directly to the skin  You may have an increase or change in the discomfort for 36-48 hours after your treatment  Apply ice and continue with any pain medicine you have been prescribed  Do not do anything strenuous today  You may shower, but no tub baths or hot tubs today  You may resume your normal activities tomorrow, but do not overdo it  Resume normal activities slowly when you are feeling better  If you experience redness, drainage or swelling at the injection site, or if you develop a fever above 100 degrees, please call [facility] at [phone] or go to the Emergency Room  Continue to take all routine medicines prescribed by your primary care physician unless otherwise instructed by our staff  Most blood thinners should be started again according to your regularly scheduled dosing  If you have any questions, please give our office a call  As no general anesthesia was used in today's procedure, you should not experience any side effects related to anesthesia  If you have a problem specifically related to your procedure, please call our office at [phone]  Problems not related to your procedure should be directed to your primary care physician

## 2022-06-02 NOTE — H&P
History of Present Illness: The patient is a 50 y o  male who presents with complaints of bilateral shoudler pain    Patient Active Problem List   Diagnosis    Hyperlipidemia    Obesity    Prostate cancer screening    Colon cancer screening    Alkaline phosphatase elevation    Vitamin D deficiency    Essential hypertension    Hyperglycemia    Steatosis of liver       Past Medical History:   Diagnosis Date    Hyperlipidemia     Obesity        Past Surgical History:   Procedure Laterality Date    INCISION AND DRAINAGE OF WOUND Right 9/7/2017    Procedure: INCISION AND DRAINAGE (I&D) BUTTOCK;  Surgeon: Maximus Solomon MD;  Location: [facility];  Service: General    VASECTOMY           Current Outpatient Medications:     ADVOCATE LANCETS MISC, by Does not apply route daily Patient Tests Once a Day  DX: E11 9 (Patient not taking: Reported on 5/26/2022), Disp: 100 each, Rfl: 5    ascorbic acid (VITAMIN C) 1000 MG tablet, Take 1 tablet by mouth daily, Disp: , Rfl:     cholecalciferol (VITAMIN D3) 1,000 units tablet, Take 1,000 Units by mouth daily, Disp: , Rfl:     ciprofloxacin-dexamethasone (CIPRODEX) otic suspension, Administer 4 drops into both ears 2 (two) times a day (Patient not taking: No sig reported), Disp: 7 5 mL, Rfl: 0    glucose blood test strip, 1 each by Other route daily Patient Tests Once a Day   DX: E11 9 (Patient not taking: Reported on 5/26/2022), Disp: 100 each, Rfl: 5    ibuprofen (MOTRIN) 200 mg tablet, Take by mouth every 6 (six) hours as needed for mild pain, Disp: , Rfl:     multivitamin (THERAGRAN) TABS, Take 1 tablet by mouth daily, Disp: , Rfl:     Omega-3 Fatty Acids (FISH OIL OMEGA-3 PO), Take by mouth, Disp: , Rfl:     Current Facility-Administered Medications:     bupivacaine (PF) (MARCAINE) 0 25 % injection 8 mL, 8 mL, Intra-articular, Once, Regulo Austin MD    Gadobutrol injection (SINGLE-DOSE) SOLN 2 mL, 2 mL, Intravenous, Once, MD Larissa lAcocer methylPREDNISolone acetate (DEPO-MEDROL) injection 80 mg, 80 mg, Intra-articular, Once, Patricia Herrera MD    Allergies   Allergen Reactions    Loratadine Hives    Metformin Itching       Physical Exam:   Vitals:    06/02/22 1029   BP: 169/94   Pulse: 56   Resp: 20   Temp: (!) 96 9 °F (36 1 °C)   SpO2: 99%     General: Awake, Alert, Oriented x 3, Mood and affect appropriate  Respiratory: Respirations even and unlabored  Cardiovascular: Peripheral pulses intact; no edema  Musculoskeletal Exam: bilateral shoulder pain    ASA Score: 3    Patient/Chart Verification  Patient ID Verified: Verbal  ID Band Applied: No  Consents Confirmed: To be obtained in the Pre-Procedure area  H&P( within 30 days) Verified: To be obtained in the Pre-Procedure area  Interval H&P(within 24 hr) Complete (required for Outpatients and Surgery Admit only): To be obtained in the Pre-Procedure area  Allergies Reviewed: Yes  Anticoag/NSAID held?: NA  Currently on antibiotics?: No  Pregnancy denied?: NA    Assessment:   1   Osteoarthritis of bilateral glenohumeral joints        Plan: Bilateral glenohumeral corticosteroid injections

## 2022-06-09 ENCOUNTER — TELEPHONE (OUTPATIENT)
Dept: PAIN MEDICINE | Facility: CLINIC | Age: 47
End: 2022-06-09

## 2022-11-03 ENCOUNTER — OFFICE VISIT (OUTPATIENT)
Dept: OBGYN CLINIC | Facility: CLINIC | Age: 47
End: 2022-11-03

## 2022-11-03 ENCOUNTER — TELEPHONE (OUTPATIENT)
Dept: OBGYN CLINIC | Facility: MEDICAL CENTER | Age: 47
End: 2022-11-03

## 2022-11-03 ENCOUNTER — TELEPHONE (OUTPATIENT)
Dept: PAIN MEDICINE | Facility: CLINIC | Age: 47
End: 2022-11-03

## 2022-11-03 VITALS
WEIGHT: 258 LBS | HEART RATE: 60 BPM | SYSTOLIC BLOOD PRESSURE: 211 MMHG | HEIGHT: 75 IN | DIASTOLIC BLOOD PRESSURE: 115 MMHG | BODY MASS INDEX: 32.08 KG/M2

## 2022-11-03 DIAGNOSIS — M19.011 OSTEOARTHRITIS OF BILATERAL GLENOHUMERAL JOINTS: Primary | ICD-10-CM

## 2022-11-03 DIAGNOSIS — M25.511 BILATERAL SHOULDER PAIN, UNSPECIFIED CHRONICITY: ICD-10-CM

## 2022-11-03 DIAGNOSIS — M19.012 OSTEOARTHRITIS OF BILATERAL GLENOHUMERAL JOINTS: Primary | ICD-10-CM

## 2022-11-03 DIAGNOSIS — M25.512 BILATERAL SHOULDER PAIN, UNSPECIFIED CHRONICITY: ICD-10-CM

## 2022-11-03 NOTE — TELEPHONE ENCOUNTER
Caller: patient    Doctor: martha    Reason for call: schedule injection ordered by orthopedics    Call back#: [phone]

## 2022-11-03 NOTE — PROGRESS NOTES
Patient Name:  Benjamin Camilo  MRN:  [id number]    Assessment & Plan     1  Osteoarthritis of bilateral glenohumeral joints  -     FL injection left shoulder (non arthrogram); Future; Expected date: 11/03/2022    2  Bilateral shoulder pain, unspecified chronicity  -     FL injection left shoulder (non arthrogram); Future; Expected date: 11/03/2022        50 y o  male with bilateral shoulder osteoarthritis and left ankle pain  The patient had great improvement in symptoms with bilateral glenohumeral joint injections but symptoms have returned and would like to repeat these injections  A referral was placed to repeat bilateral corticosteroid injections to his glenohumeral joints  The patient was reminded if he would like to move forward with total shoulder arthroplasty he should not have an injection 3 months prior to having this surgery  Patient was understanding  I will see the patient back in 3 months for repeat evaluation and new x-rays of bilateral shoulders  If the patient wants to move forward with surgery in the future, it was discussed we would also obtain a CT blueprint and an updated MRI study to evaluate the quality of the rotator cuff for preoperative planning  History of the Present Illness   Benjamin Camilo is a 50 y o  male with bilateral shoulder osteoarthritis  Patient was last seen in the office on 5/26/22 where nonoperative and operative treatments were discussed including PT, US guided glenohumeral injection at minimum of 3 months apart, OTC oral and topical analgesics, surgical management of total shoulder arthroplasty  The patient received an US guided glenohumeral injection to bilateral shoulders on  6/2/22 with Dr Abilio Gtz  He reports these injections provided great relief  He has an event coming up mid November and would like to have repeat injections before this  Review of Systems     Review of Systems   Constitutional: Negative for appetite change and unexpected weight change  HENT: Negative for congestion and trouble swallowing  Eyes: Negative for visual disturbance  Respiratory: Negative for cough and shortness of breath  Cardiovascular: Negative for chest pain and palpitations  Gastrointestinal: Negative for nausea and vomiting  Endocrine: Negative for cold intolerance and heat intolerance  Musculoskeletal: Negative for joint swelling and myalgias  Skin: Negative for rash  Neurological: Negative for numbness  Physical Exam     BP (!) 211/115   Pulse 60   Ht 6' 3" (1 905 m)   Wt 117 kg (258 lb)   BMI 32 25 kg/m²     Bilateral Shoulders: Active range of motion   130 degrees forward flexion  140 degrees abduction  50 degrees external rotation   sacrum internal rotation    Passive range of motion   130 on left and 140 on right degrees of forward flexion   There is 5/5 strength with external rotation testing at the side  Empty can testing is negative   The patient is neurovascularly intact distally in the extremity  Data Review     I have personally reviewed pertinent films in PACS, and my interpretation follows  No new images  Social History     Tobacco Use   • Smoking status: Former Smoker     Packs/day: 2 00     Years: 20 00     Pack years: 40 00     Quit date:      Years since quittin 8   • Smokeless tobacco: Never Used   Vaping Use   • Vaping Use: Never used   Substance Use Topics   • Alcohol use: Yes     Comment: occasionally   • Drug use: No           Procedures  None performed       Blane Escobedo     Scribe Attestation    I,:  Blane Escobedo am acting as a scribe while in the presence of the attending physician :       I,:  Susie Jimenez DO personally performed the services described in this documentation    as scribed in my presence :

## 2022-11-03 NOTE — TELEPHONE ENCOUNTER
Caller: Patient    Doctor: Kellen Mclaughlin    Reason for call:     Patient is calling asking if the image assisted cortisone injections being ordered for the shoulders, he is asking if his left ankle can also have an order for this injection and be done at the same time? He is asking for a call back      Call back#: [phone]

## 2022-11-04 NOTE — TELEPHONE ENCOUNTER
S/w pt and scheduled Bilateral GH injection for 11/17/22 245 pm arrival. Gave pre procedure instructions and /vaccine policy.

## 2022-11-04 NOTE — TELEPHONE ENCOUNTER
Per Dr Chase Marin 11/3/22 office note "The patient had great improvement in symptoms with bilateral glenohumeral joint injections but symptoms have returned and would like to repeat these injections. A referral was placed to repeat bilateral corticosteroid injections to his glenohumeral joints. Last Bilateral Glenohumeral Joint Injection was 6/2/22. Ok to schedule repeat?

## 2022-11-04 NOTE — TELEPHONE ENCOUNTER
Caller: Rosamaria Fernández    Doctor:Dr [facility]     Reason for call: Patient calling to schedule injections referred by Dr Aleksander Culp    Call back#: [phone]

## 2022-11-17 ENCOUNTER — HOSPITAL ENCOUNTER (OUTPATIENT)
Dept: RADIOLOGY | Facility: CLINIC | Age: 47
End: 2022-11-17

## 2022-11-17 VITALS
OXYGEN SATURATION: 97 % | DIASTOLIC BLOOD PRESSURE: 107 MMHG | RESPIRATION RATE: 20 BRPM | TEMPERATURE: 97.3 F | HEART RATE: 67 BPM | SYSTOLIC BLOOD PRESSURE: 179 MMHG

## 2022-11-17 DIAGNOSIS — M19.012 OSTEOARTHRITIS OF BILATERAL GLENOHUMERAL JOINTS: ICD-10-CM

## 2022-11-17 DIAGNOSIS — M19.011 OSTEOARTHRITIS OF BILATERAL GLENOHUMERAL JOINTS: ICD-10-CM

## 2022-11-17 RX ORDER — METHYLPREDNISOLONE ACETATE 80 MG/ML
80 INJECTION, SUSPENSION INTRA-ARTICULAR; INTRALESIONAL; INTRAMUSCULAR; PARENTERAL; SOFT TISSUE ONCE
Status: COMPLETED | OUTPATIENT
Start: 2022-11-17 | End: 2022-11-17

## 2022-11-17 RX ORDER — BUPIVACAINE HCL/PF 2.5 MG/ML
9 VIAL (ML) INJECTION ONCE
Status: COMPLETED | OUTPATIENT
Start: 2022-11-17 | End: 2022-11-17

## 2022-11-17 RX ADMIN — METHYLPREDNISOLONE ACETATE 80 MG: 80 INJECTION, SUSPENSION INTRA-ARTICULAR; INTRALESIONAL; INTRAMUSCULAR; PARENTERAL; SOFT TISSUE at 15:17

## 2022-11-17 RX ADMIN — IOHEXOL 2 ML: 300 INJECTION, SOLUTION INTRAVENOUS at 15:16

## 2022-11-17 RX ADMIN — Medication 9 ML: at 15:17

## 2022-11-17 NOTE — DISCHARGE INSTR - LAB
Steroid Joint Injection   WHAT YOU NEED TO KNOW:   A steroid joint injection is a procedure to inject steroid medicine into a joint  Steroid medicine decreases pain and inflammation  The injection may also contain an anesthetic (numbing medicine) to decrease pain  It may be done to treat conditions such as arthritis, gout, or carpal tunnel syndrome  The injections may be given in your knee, ankle, shoulder, elbow, wrist, ankle or sacroiliac joint  Do not apply heat to any area that is numb  If you have discomfort or soreness at the injection site, you may apply ice today, 20 minutes on and 20 minutes off  Tomorrow you may use ice or warm, moist heat  Do not apply ice or heat directly to the skin  You may have an increase or change in the discomfort for 36-48 hours after your treatment  Apply ice and continue with any pain medicine you have been prescribed  Do not do anything strenuous today  You may shower, but no tub baths or hot tubs today  You may resume your normal activities tomorrow, but do not “overdo it”  Resume normal activities slowly when you are feeling better  If you experience redness, drainage or swelling at the injection site, or if you develop a fever above 100 degrees, please call [facility] at [phone] or go to the Emergency Room  Continue to take all routine medicines prescribed by your primary care physician unless otherwise instructed by our staff  Most blood thinners should be started again according to your regularly scheduled dosing  If you have any questions, please give our office a call  As no general anesthesia was used in today's procedure, you should not experience any side effects related to anesthesia  If you are diabetic, the steroids used in today's injection may temporarily increase your blood sugar levels after the first few days after your injection   Please keep a close eye on your sugars and alert the doctor who manages your diabetes if your sugars are significantly high from your baseline or you are symptomatic  If you have a problem specifically related to your procedure, please call our office at [phone]  Problems not related to your procedure should be directed to your primary care physician

## 2022-11-17 NOTE — NURSING NOTE
Per epic, pt's BP has been elevated at last few ov  MD aware  Pt asymptomatic  Pt was advised to reach out to PCP today for a sooner appointment

## 2022-11-21 ENCOUNTER — OFFICE VISIT (OUTPATIENT)
Dept: OBGYN CLINIC | Facility: CLINIC | Age: 47
End: 2022-11-21

## 2022-11-21 ENCOUNTER — APPOINTMENT (OUTPATIENT)
Dept: RADIOLOGY | Facility: CLINIC | Age: 47
End: 2022-11-21

## 2022-11-21 VITALS
DIASTOLIC BLOOD PRESSURE: 101 MMHG | BODY MASS INDEX: 31.38 KG/M2 | HEART RATE: 69 BPM | SYSTOLIC BLOOD PRESSURE: 184 MMHG | WEIGHT: 252.4 LBS | HEIGHT: 75 IN

## 2022-11-21 DIAGNOSIS — M19.019 GLENOHUMERAL ARTHRITIS: ICD-10-CM

## 2022-11-21 DIAGNOSIS — M25.572 PAIN, JOINT, ANKLE AND FOOT, LEFT: ICD-10-CM

## 2022-11-21 DIAGNOSIS — M25.562 LEFT KNEE PAIN, UNSPECIFIED CHRONICITY: ICD-10-CM

## 2022-11-21 DIAGNOSIS — M25.512 BILATERAL SHOULDER PAIN, UNSPECIFIED CHRONICITY: ICD-10-CM

## 2022-11-21 DIAGNOSIS — S93.409A SPRAIN OF ANKLE, INITIAL ENCOUNTER: ICD-10-CM

## 2022-11-21 DIAGNOSIS — M23.92 ACUTE INTERNAL DERANGEMENT OF LEFT KNEE: Primary | ICD-10-CM

## 2022-11-21 DIAGNOSIS — M25.511 BILATERAL SHOULDER PAIN, UNSPECIFIED CHRONICITY: ICD-10-CM

## 2022-11-21 NOTE — PROGRESS NOTES
Patient Name:  Oscar Leblanc  MRN:  [id number]    Assessment & Plan     1  Acute internal derangement of left knee  -     MRI knee left  wo contrast; Future; Expected date: 11/21/2022    2  Pain, joint, ankle and foot, left  -     XR ankle 3+ vw left; Future; Expected date: 11/21/2022    3  Left knee pain, unspecified chronicity  -     XR knee 4+ vw left injury; Future; Expected date: 11/21/2022  -     XR knee 1 or 2 vw right; Future; Expected date: 11/21/2022    4  Glenohumeral arthritis  -     XR shoulder 2+ vw right; Future; Expected date: 11/21/2022  -     XR shoulder 2+ vw left; Future; Expected date: 11/21/2022    5  Sprain of ankle, initial encounter  -     Ambulatory Referral to Physical Therapy; Future        50 y o  male with severe bilateral glenohumeral osteoarthritis, Left ankle sprain, Left knee acute internal derangement with suspected lateral meniscus tear  X-rays reviewed in office today with patient  In regards to bilateral shoulders discussed continued nonoperative vs surgical management  Nonoperative treatment including home exercises, outpatient PT for improvement of range of motion, postural and deltoid strengthening, OTC oral analgesics, repeat glenohumeral corticosteroid injections  Briefly discussed surgical management of shoulder osteoarthritis consisting of hemiarthroplasty of the shoulder to decrease pain vs total shoulder arthroplasty  Risks of surgical intervention including but not limited to young age, increased activity including loosening of prosthesis as patient is active in karate and working out, infection, need for subsequent procedures/revision vs conversion to total arthroplasty, dislocation, wound healing complications, nerve/blood vessel injury  At this time, he has moderate pain relief from corticosteroid injections and will continue with nonoperative treatment  In regards to Left ankle sprain, suggested using taping prior to increased activity for stability   He may continue compression sleeve as needed for pain/swelling relief  Suggested outpatient PT to work on strengthening, balance, and proprioception exercises and patient acceptable to therapy  In regards to Left knee pain, positive special testing, effusion, locking symptoms, patients young age and activity level, will move forward with MRI of Left knee to evaluate internal derangement; evaluate for lateral meniscus tear  In the meantime, advised patient to hold off on twisting, pivoting, deep knee flexion  Advised patient to hold off on training/sparring at this time as to prevent worsening of Left knee pain  Will follow up in office after MRI of Left knee for reevaluation and discussion of possible surgical intervention  History of the Present Illness   Fred Sands is a 50 y o  male with Bilateral glenohumeral osteoarthritis, Left knee and Left ankle pain  Today, patient reports he had bilateral shoulder injections performed on 11/17/2022  Since the injections, he feels 100% improvement  In regards to the Left knee, he admits to pain that feels "like when I had my meniscus tear"  He states pain has been increasing for the past 3 weeks without known injury  He states he is "constantly torquing on it" while doing karate and working out  Patient does admit to locking at the outside of his knee  He continues to spar about 3 times monthly  In regards to Left ankle, he notes increased pain during activity, specifically karate  He locates pain to the outside of his ankle  He also admits to clicking of the ankle which occasionally causes some discomfort  Review of Systems     Review of Systems   Constitutional: Negative for chills and fever  HENT: Negative for ear pain and sore throat  Eyes: Negative for pain and visual disturbance  Respiratory: Negative for cough and shortness of breath  Cardiovascular: Negative for chest pain and palpitations     Gastrointestinal: Negative for abdominal pain and vomiting  Genitourinary: Negative for dysuria and hematuria  Musculoskeletal: Negative for arthralgias and back pain  Skin: Negative for color change and rash  Neurological: Negative for seizures and syncope  All other systems reviewed and are negative  Physical Exam     BP (!) 184/101   Pulse 69   Ht 6' 3" (1 905 m)   Wt 114 kg (252 lb 6 4 oz)   BMI 31 55 kg/m²     Right Shoulder: Active range of motion   130 degrees forward flexion  140 degrees abduction  50 degrees external rotation   The patient is neurovascularly intact distally in the extremity  Left Shoulder: Active range of motion   130 degrees forward flexion  140 degrees abduction  50 degrees external rotation   The patient is neurovascularly intact distally in the extremity  Left Knee  Range of motion from 0 to 120 degrees with pain at terminal extension  There is crepitus with range of motion  There is trace effusion  There is tenderness over the medial and lateral joint lines  There is 5/5 quadriceps strength and preserved tone  The patient is able to perform a straight leg raise  positive  patellar grind test   Anterior drawer tests is negative  negative Lachman Test    Posterior drawer test is   negative   Varus stress testing reveals no instability at 0 and 30 degrees   Valgus stress testing reveals no instability at 0 and 30 degrees  Alondra's testing is positive laterally  The patient is neurovascular intact distally  Left Ankle  Active range of motion: 7 degrees of dorsiflexion to 40 degrees plantar flexion  There is normal range of motion of toes in plantar flexion and dorsiflexion  There is tenderness present over the ATFL  There is pain with resisted eversion  Anterior drawer test is negative  Talar tilt test is negative  Sensation is intact to light touch superficial peroneal, deep peroneal, tibial, saphenous, and sural nerve distributions  2+ DP pulse present        Data Review     I have personally reviewed pertinent films in PACS, and my interpretation follows  X-rays taken 2022 of Bilateral shoulders demonstrate severe glenohumeral osteoarthritis with large inferior humeral head osteophytes  Joint space narrowing and sclerosis noted  No acute fracture or dislocation noted  X-rays taken 2022 of Left knee demonstrates mild tricompartmental osteoarthritis most noted medial and patellofemoral compartments  Also noted was Right knee with mild to moderate medial joint space narrowing  No acute fracture or dislocations noted  X-rays taken 2022 of Left ankle demonstrates mild diffuse degenerative changes without significant joint space compromise  No acute fracture or dislocation noted       Social History     Tobacco Use   • Smoking status: Former     Packs/day: 2 00     Years: 20 00     Pack years: 40 00     Types: Cigarettes     Quit date:      Years since quittin 8   • Smokeless tobacco: Never   Vaping Use   • Vaping Use: Never used   Substance Use Topics   • Alcohol use: Yes     Comment: occasionally   • Drug use: No           Procedures   None     Kamila Traylor, DO

## 2022-11-23 ENCOUNTER — TELEPHONE (OUTPATIENT)
Dept: PAIN MEDICINE | Facility: CLINIC | Age: 47
End: 2022-11-23

## 2022-12-12 ENCOUNTER — HOSPITAL ENCOUNTER (OUTPATIENT)
Dept: MRI IMAGING | Facility: CLINIC | Age: 47
Discharge: HOME | End: 2022-12-12

## 2022-12-12 DIAGNOSIS — M23.92 ACUTE INTERNAL DERANGEMENT OF LEFT KNEE: ICD-10-CM

## 2022-12-15 ENCOUNTER — OFFICE VISIT (OUTPATIENT)
Dept: OBGYN CLINIC | Facility: CLINIC | Age: 47
End: 2022-12-15

## 2022-12-15 VITALS
SYSTOLIC BLOOD PRESSURE: 209 MMHG | HEART RATE: 58 BPM | WEIGHT: 252.4 LBS | HEIGHT: 75 IN | DIASTOLIC BLOOD PRESSURE: 102 MMHG | BODY MASS INDEX: 31.38 KG/M2

## 2022-12-15 DIAGNOSIS — M17.12 PRIMARY OSTEOARTHRITIS OF LEFT KNEE: ICD-10-CM

## 2022-12-15 DIAGNOSIS — M25.562 ACUTE PAIN OF LEFT KNEE: ICD-10-CM

## 2022-12-15 DIAGNOSIS — S83.272A COMPLEX TEAR OF LATERAL MENISCUS OF LEFT KNEE AS CURRENT INJURY, INITIAL ENCOUNTER: Primary | ICD-10-CM

## 2022-12-15 RX ORDER — CHLORHEXIDINE GLUCONATE 4 G/100ML
SOLUTION TOPICAL DAILY PRN
OUTPATIENT
Start: 2022-12-15

## 2022-12-15 NOTE — H&P (VIEW-ONLY)
Patient Name:  Herb Alan  MRN:  [id number]    Assessment & Plan     1  Complex tear of lateral meniscus of left knee as current injury, initial encounter  -     Case request operating room: ARTHROSCOPY KNEE- Left Knee partial lateral menisectomy, all associated procedures; Standing  -     Ambulatory referral to [facility]; Future  -     Comprehensive metabolic panel; Future  -     CBC and differential; Future  -     EKG 12 lead; Future  -     XR chest pa & lateral; Future; Expected date: 12/15/2022  -     Case request operating room: ARTHROSCOPY KNEE- Left Knee partial lateral menisectomy, all associated procedures    2  Acute pain of left knee    3  Primary osteoarthritis of left knee        50 y o  male with Left knee lateral meniscus tear with persistent pain and mechanical symptoms  I reviewed and discussed the patient's left knee MRI displaying lateral meniscus tear with associated lateral compartment degenerative changes and reactive edema in lateral femur and tibia     I discussed the patient's diagnosis and associated treatment options including conservative and surgical treatment  Non operative management would include formal physical therapy and physician directed home exercise program, activity modification, oral analgesics, and possible injection therapy  Thus far the patient has failed non operative treatments including activity modification, home exercise plan  Due to the patient's symptoms of pain and locking, I have recommended surgical intervention  Risks of surgery, including but not limited to, anesthesia complications, infection, damage to nerves and blood vessels, blood clots, postoperative stiffness, recurrent meniscal tearing, posttraumatic arthritis, residual pain, need for subsequent surgery,  were discussed at length  Benefits of surgery include improved pain, decrease risk of tear propagation, and improved mechanical symptoms    We discussed the possibility of meniscal repair if tear is amenable and he has less degenerative changes and visualized on MRI  We discussed postoperative rehabilitation with potential period of nonweightbearing and limited knee flexion with repair as well as risk of recurrent tear and need for subsequent surgery  We did discuss that he already appears to have degenerative changes in his knee which may continue to progress regardless of surgical treatment leading to continued pain  The patient understands the risks and benefits and has no further questions  The patient has elected to proceed forward with left knee arthroscopy with partial lateral lateral menisectomy with possible repair  I have ordered preoperative laboratory testing, EKG, chest x-rays and evaluation for preoperative optimization and clearance from PCP  I will see Abdirashid Young on the day of surgery, which is tentatively scheduled for 12/28/22  History of the Present Illness   Bina Coronado is a 50 y o  male with Left knee pain with suspicion of meniscus tear  The patient was last seen in the office on 11/21/22 where a MRI study was ordered  He has a cruise pain putting shoes on and rising from a seated position  He does have episodes of locking  Pain is also exacerbated by twisting  Today he feels pretty good overall, with some discomfort at the lateral knee  Review of Systems     Review of Systems   Constitutional: Negative for appetite change and unexpected weight change  HENT: Negative for congestion and trouble swallowing  Eyes: Negative for visual disturbance  Respiratory: Negative for cough and shortness of breath  Cardiovascular: Negative for chest pain and palpitations  Gastrointestinal: Negative for nausea and vomiting  Endocrine: Negative for cold intolerance and heat intolerance  Musculoskeletal: Positive for arthralgias and joint swelling  Negative for myalgias  Skin: Negative for rash  Neurological: Negative for numbness         Physical Exam     BP (!) 209/102   Pulse 58   Ht 6' 3" (1 905 m)   Wt 114 kg (252 lb 6 4 oz)   BMI 31 55 kg/m²     Left  Knee  Range of motion from 0 to 130  There is mild effusion  There is lateral joint line tenderness  The patient is able to perform a straight leg raise, 5/5 quad strength  Varus stress testing reveals no at 0 and 30 degrees   Valgus stress testing reveals no at 0 and 30 degrees  Alondra's testing is positive laterally  The patient is neurovascular intact distally  Heart sounds are normal   Lung sounds are clear to ausculation  Data Review     I have personally reviewed pertinent films in PACS, and my interpretation follows  MRI taken 22 of Left  knee demonstrates complex tearing of the lateral meniscus body and posterior horn  There are moderate degenerative changes at the lateral joint with full thickness cartilage loss and well preserved joint space  There is bony edema on the lateral tibia and femur       Social History     Tobacco Use   • Smoking status: Former     Packs/day: 2 00     Years: 20 00     Pack years: 40 00     Types: Cigarettes     Quit date:      Years since quittin 9   • Smokeless tobacco: Never   Vaping Use   • Vaping Use: Never used   Substance Use Topics   • Alcohol use: Yes     Comment: occasionally   • Drug use: No           Procedures  None performed     Regine Zavala DO   Scribe Attestation    I,:  Jocelyn Saleem am acting as a scribe while in the presence of the attending physician :       I,:  Regine Zavala DO personally performed the services described in this documentation    as scribed in my presence :

## 2022-12-15 NOTE — PROGRESS NOTES
Patient Name:  Gino Officer  MRN:  [id number]    Assessment & Plan     1  Complex tear of lateral meniscus of left knee as current injury, initial encounter  -     Case request operating room: ARTHROSCOPY KNEE- Left Knee partial lateral menisectomy, all associated procedures; Standing  -     Ambulatory referral to [facility]; Future  -     Comprehensive metabolic panel; Future  -     CBC and differential; Future  -     EKG 12 lead; Future  -     XR chest pa & lateral; Future; Expected date: 12/15/2022  -     Case request operating room: ARTHROSCOPY KNEE- Left Knee partial lateral menisectomy, all associated procedures    2  Acute pain of left knee    3  Primary osteoarthritis of left knee        50 y o  male with Left knee lateral meniscus tear with persistent pain and mechanical symptoms  I reviewed and discussed the patient's left knee MRI displaying lateral meniscus tear with associated lateral compartment degenerative changes and reactive edema in lateral femur and tibia     I discussed the patient's diagnosis and associated treatment options including conservative and surgical treatment  Non operative management would include formal physical therapy and physician directed home exercise program, activity modification, oral analgesics, and possible injection therapy  Thus far the patient has failed non operative treatments including activity modification, home exercise plan  Due to the patient's symptoms of pain and locking, I have recommended surgical intervention  Risks of surgery, including but not limited to, anesthesia complications, infection, damage to nerves and blood vessels, blood clots, postoperative stiffness, recurrent meniscal tearing, posttraumatic arthritis, residual pain, need for subsequent surgery,  were discussed at length  Benefits of surgery include improved pain, decrease risk of tear propagation, and improved mechanical symptoms    We discussed the possibility of meniscal repair if tear is amenable and he has less degenerative changes and visualized on MRI  We discussed postoperative rehabilitation with potential period of nonweightbearing and limited knee flexion with repair as well as risk of recurrent tear and need for subsequent surgery  We did discuss that he already appears to have degenerative changes in his knee which may continue to progress regardless of surgical treatment leading to continued pain  The patient understands the risks and benefits and has no further questions  The patient has elected to proceed forward with left knee arthroscopy with partial lateral lateral menisectomy with possible repair  I have ordered preoperative laboratory testing, EKG, chest x-rays and evaluation for preoperative optimization and clearance from PCP  I will see Tim Parker on the day of surgery, which is tentatively scheduled for 12/28/22  History of the Present Illness   Nicole Arredondo is a 50 y o  male with Left knee pain with suspicion of meniscus tear  The patient was last seen in the office on 11/21/22 where a MRI study was ordered  He has a cruise pain putting shoes on and rising from a seated position  He does have episodes of locking  Pain is also exacerbated by twisting  Today he feels pretty good overall, with some discomfort at the lateral knee  Review of Systems     Review of Systems   Constitutional: Negative for appetite change and unexpected weight change  HENT: Negative for congestion and trouble swallowing  Eyes: Negative for visual disturbance  Respiratory: Negative for cough and shortness of breath  Cardiovascular: Negative for chest pain and palpitations  Gastrointestinal: Negative for nausea and vomiting  Endocrine: Negative for cold intolerance and heat intolerance  Musculoskeletal: Positive for arthralgias and joint swelling  Negative for myalgias  Skin: Negative for rash  Neurological: Negative for numbness         Physical Exam     BP (!) 209/102   Pulse 58   Ht 6' 3" (1 905 m)   Wt 114 kg (252 lb 6 4 oz)   BMI 31 55 kg/m²     Left  Knee  Range of motion from 0 to 130  There is mild effusion  There is lateral joint line tenderness  The patient is able to perform a straight leg raise, 5/5 quad strength  Varus stress testing reveals no at 0 and 30 degrees   Valgus stress testing reveals no at 0 and 30 degrees  Alondra's testing is positive laterally  The patient is neurovascular intact distally  Heart sounds are normal   Lung sounds are clear to ausculation  Data Review     I have personally reviewed pertinent films in PACS, and my interpretation follows  MRI taken 22 of Left  knee demonstrates complex tearing of the lateral meniscus body and posterior horn  There are moderate degenerative changes at the lateral joint with full thickness cartilage loss and well preserved joint space  There is bony edema on the lateral tibia and femur       Social History     Tobacco Use   • Smoking status: Former     Packs/day: 2 00     Years: 20 00     Pack years: 40 00     Types: Cigarettes     Quit date:      Years since quittin 9   • Smokeless tobacco: Never   Vaping Use   • Vaping Use: Never used   Substance Use Topics   • Alcohol use: Yes     Comment: occasionally   • Drug use: No           Procedures  None performed     Carla Goel DO   Scribe Attestation    I,:  Davis Hi am acting as a scribe while in the presence of the attending physician :       I,:  Carla Goel DO personally performed the services described in this documentation    as scribed in my presence :

## 2022-12-17 ENCOUNTER — APPOINTMENT (OUTPATIENT)
Dept: LAB | Facility: CLINIC | Age: 47
End: 2022-12-17

## 2022-12-17 DIAGNOSIS — S83.272A COMPLEX TEAR OF LATERAL MENISCUS OF LEFT KNEE AS CURRENT INJURY, INITIAL ENCOUNTER: ICD-10-CM

## 2022-12-17 LAB
ALBUMIN SERPL BCP-MCNC: 3.7 G/DL (ref 3.5–5)
ALP SERPL-CCNC: 100 U/L (ref 46–116)
ALT SERPL W P-5'-P-CCNC: 41 U/L (ref 12–78)
ANION GAP SERPL CALCULATED.3IONS-SCNC: 7 MMOL/L (ref 4–13)
AST SERPL W P-5'-P-CCNC: 23 U/L (ref 5–45)
BASOPHILS # BLD AUTO: 0.04 THOUSANDS/ÂΜL (ref 0–0.1)
BASOPHILS NFR BLD AUTO: 1 % (ref 0–1)
BILIRUB SERPL-MCNC: 0.54 MG/DL (ref 0.2–1)
BUN SERPL-MCNC: 19 MG/DL (ref 5–25)
CALCIUM SERPL-MCNC: 8.8 MG/DL (ref 8.3–10.1)
CHLORIDE SERPL-SCNC: 107 MMOL/L (ref 96–108)
CO2 SERPL-SCNC: 27 MMOL/L (ref 21–32)
CREAT SERPL-MCNC: 0.89 MG/DL (ref 0.6–1.3)
EOSINOPHIL # BLD AUTO: 0.2 THOUSAND/ÂΜL (ref 0–0.61)
EOSINOPHIL NFR BLD AUTO: 5 % (ref 0–6)
ERYTHROCYTE [DISTWIDTH] IN BLOOD BY AUTOMATED COUNT: 13.2 % (ref 11.6–15.1)
GFR SERPL CREATININE-BSD FRML MDRD: 100 ML/MIN/1.73SQ M
GLUCOSE P FAST SERPL-MCNC: 119 MG/DL (ref 65–99)
HCT VFR BLD AUTO: 40.7 % (ref 36.5–49.3)
HGB BLD-MCNC: 13.2 G/DL (ref 12–17)
IMM GRANULOCYTES # BLD AUTO: 0.02 THOUSAND/UL (ref 0–0.2)
IMM GRANULOCYTES NFR BLD AUTO: 1 % (ref 0–2)
LYMPHOCYTES # BLD AUTO: 1.17 THOUSANDS/ÂΜL (ref 0.6–4.47)
LYMPHOCYTES NFR BLD AUTO: 30 % (ref 14–44)
MCH RBC QN AUTO: 29.3 PG (ref 26.8–34.3)
MCHC RBC AUTO-ENTMCNC: 32.4 G/DL (ref 31.4–37.4)
MCV RBC AUTO: 90 FL (ref 82–98)
MONOCYTES # BLD AUTO: 0.44 THOUSAND/ÂΜL (ref 0.17–1.22)
MONOCYTES NFR BLD AUTO: 11 % (ref 4–12)
NEUTROPHILS # BLD AUTO: 1.98 THOUSANDS/ÂΜL (ref 1.85–7.62)
NEUTS SEG NFR BLD AUTO: 52 % (ref 43–75)
NRBC BLD AUTO-RTO: 0 /100 WBCS
PLATELET # BLD AUTO: 226 THOUSANDS/UL (ref 149–390)
PMV BLD AUTO: 11.2 FL (ref 8.9–12.7)
POTASSIUM SERPL-SCNC: 4 MMOL/L (ref 3.5–5.3)
PROT SERPL-MCNC: 7.1 G/DL (ref 6.4–8.4)
RBC # BLD AUTO: 4.5 MILLION/UL (ref 3.88–5.62)
SODIUM SERPL-SCNC: 141 MMOL/L (ref 135–147)
WBC # BLD AUTO: 3.85 THOUSAND/UL (ref 4.31–10.16)

## 2022-12-19 ENCOUNTER — TELEPHONE (OUTPATIENT)
Dept: OBGYN CLINIC | Facility: CLINIC | Age: 47
End: 2022-12-19

## 2022-12-19 NOTE — TELEPHONE ENCOUNTER
Tried to contact patient in regards to still needing his XRAY and EKG done patient did not answer and I could not leave a message NO VM box

## 2022-12-20 NOTE — TELEPHONE ENCOUNTER
Called patient again today to make him aware of needing Xray and EKG patient states he can go tomorrow for it

## 2022-12-21 ENCOUNTER — OFFICE VISIT (OUTPATIENT)
Dept: LAB | Facility: HOSPITAL | Age: 47
End: 2022-12-21

## 2022-12-21 ENCOUNTER — HOSPITAL ENCOUNTER (OUTPATIENT)
Dept: RADIOLOGY | Facility: HOSPITAL | Age: 47
Discharge: HOME | End: 2022-12-21

## 2022-12-21 ENCOUNTER — CONSULT (OUTPATIENT)
Dept: INTERNAL MEDICINE CLINIC | Facility: CLINIC | Age: 47
End: 2022-12-21

## 2022-12-21 VITALS
SYSTOLIC BLOOD PRESSURE: 158 MMHG | DIASTOLIC BLOOD PRESSURE: 90 MMHG | HEIGHT: 75 IN | BODY MASS INDEX: 31.81 KG/M2 | WEIGHT: 255.8 LBS | HEART RATE: 70 BPM | OXYGEN SATURATION: 97 % | TEMPERATURE: 99.9 F

## 2022-12-21 DIAGNOSIS — Z01.818 PRE-OP EXAMINATION: Primary | ICD-10-CM

## 2022-12-21 DIAGNOSIS — Z23 ENCOUNTER FOR IMMUNIZATION: ICD-10-CM

## 2022-12-21 DIAGNOSIS — S83.272A COMPLEX TEAR OF LATERAL MENISCUS OF LEFT KNEE AS CURRENT INJURY, INITIAL ENCOUNTER: ICD-10-CM

## 2022-12-21 DIAGNOSIS — I10 ESSENTIAL HYPERTENSION: ICD-10-CM

## 2022-12-21 DIAGNOSIS — Z12.11 ENCOUNTER FOR SCREENING FOR MALIGNANT NEOPLASM OF COLON: ICD-10-CM

## 2022-12-21 LAB — SL AMB POCT HEMOGLOBIN AIC: 6 (ref ?–6.5)

## 2022-12-21 RX ORDER — METOPROLOL TARTRATE 50 MG/1
50 TABLET, FILM COATED ORAL EVERY 12 HOURS SCHEDULED
Qty: 60 TABLET | Refills: 3 | Status: SHIPPED | OUTPATIENT
Start: 2022-12-21 | End: 2023-01-05 | Stop reason: SDUPTHER

## 2022-12-21 RX ORDER — BORON 6 MG
TABLET ORAL
COMMUNITY

## 2022-12-21 NOTE — PROGRESS NOTES
INTERNAL MEDICINE PRE-OPERATIVE EVALUATION  [facility] [address] [place] [address]    NAME: Gerry Davis  AGE: 50 y o  SEX: male  : 1975     DATE: 2022     Internal Medicine Pre-Operative Evaluation:     Chief Complaint: Pre-operative Evaluation     Referring Provider: Sharmila Kendrick DO        History of Present Illness:     Gerry Davis is a 50 y o  male who presents to the office today for a preoperative consultation at the request of surgeon, Dr Patterson Members  On this visit patient was found to be hypertensive and we were unable to clear him at this time for surgery, He is to follow with his PCP after starting medication therapy as well as limiting salt in his diet    Assessment of Chronic Conditions:   Hypertension- Was on medication however lost weight and lowered BP, inactivity has increased it      Assessment of Cardiac Risk:  · Denies unstable or severe angina or MI in the last 6 weeks or history of stent placement in the last year   · Denies decompensated heart failure (e g  New onset heart failure, NYHA functional class IV heart failure, or worsening existing heart failure)  · Denies significant arrhythmias such as high grade AV block, symptomatic ventricular arrhythmia, newly recognized ventricular tachycardia, supraventricular tachycardia with resting heart rate >100, or symptomatic bradycardia  · Denies severe heart valve disease including aortic stenosis or symptomatic mitral stenosis     Exercise Capacity:  · Able to walk 4 blocks without symptoms?: Yes  · Able to walk 2 flights without symptoms?: Yes    Prior Anesthesia Reactions: No     Personal history of venous thromboembolic disease? No    History of steroid use for >2 weeks within last year? No    STOP-BANG Sleep Apnea Screening Questionnaire:      Do you SNORE loudly (louder than talking or loud enough to be heard through closed doors)?  No = 0 point   Do you often feel TIRED, fatigued, or sleepy during daytime? No = 0 point   Has anyone OBSERVED you stop breathing during your sleep? No = 0 point   Do you have or are you being treated for high blood pressure? Yes = 1 point   BMI more than 35 kg/m2? No = 0 point   AGE over 48years old? No = 0 point   NECK circumference > 16 inches (40 cm)? No = 0 point   Male GENDER? Yes = 1 point   TOTAL SCORE 2 = LOW risk of BEE       Review of Systems:     Review of Systems   Constitutional: Negative for appetite change, chills, diaphoresis, fatigue, fever and unexpected weight change  HENT: Negative for postnasal drip and sneezing  Eyes: Negative for visual disturbance  Respiratory: Negative for chest tightness and shortness of breath  Cardiovascular: Negative for chest pain, palpitations and leg swelling  Gastrointestinal: Negative for abdominal pain and blood in stool  Endocrine: Negative for cold intolerance, heat intolerance, polydipsia, polyphagia and polyuria  Genitourinary: Negative for difficulty urinating, dysuria, frequency and urgency  Musculoskeletal: Negative for arthralgias and myalgias  Skin: Negative for rash and wound  Neurological: Negative for dizziness, weakness, light-headedness and headaches  Hematological: Negative for adenopathy  Psychiatric/Behavioral: Negative for confusion, dysphoric mood and sleep disturbance  The patient is not nervous/anxious  Problem List:     Patient Active Problem List   Diagnosis   • Hyperlipidemia   • Obesity   • Prostate cancer screening   • Colon cancer screening   • Alkaline phosphatase elevation   • Vitamin D deficiency   • Essential hypertension   • Hyperglycemia   • Steatosis of liver        Allergies: Allergies   Allergen Reactions   • Loratadine Hives   • Metformin Itching        Current Medications:       Current Outpatient Medications:   •  ADVOCATE LANCETS MISC, by Does not apply route daily Patient Tests Once a Day   DX: E11 9, Disp: 100 each, Rfl: 5  •  ascorbic acid (VITAMIN C) 1000 MG tablet, Take 1 tablet by mouth as needed, Disp: , Rfl:   •  [place] 6 MG TABS, Take by mouth, Disp: , Rfl:   •  cholecalciferol (VITAMIN D3) 1,000 units tablet, Take 5,000 Units by mouth daily, Disp: , Rfl:   •  ibuprofen (MOTRIN) 200 mg tablet, Take by mouth every 6 (six) hours as needed for mild pain, Disp: , Rfl:   •  multivitamin (THERAGRAN) TABS, Take 1 tablet by mouth daily, Disp: , Rfl:   •  NON FORMULARY, Take by mouth in the morning Shilajit tablet, Disp: , Rfl:   •  Omega-3 Fatty Acids (FISH OIL OMEGA-3 PO), Take by mouth, Disp: , Rfl:      Past History:     Past Medical History:   Diagnosis Date   • Hyperlipidemia    • Obesity         Past Surgical History:   Procedure Laterality Date   • COLONOSCOPY     • INCISION AND DRAINAGE OF WOUND Right 2017    Procedure: INCISION AND DRAINAGE (I&D) BUTTOCK;  Surgeon: Tamara Fontana MD;  Location: [facility];  Service: General   • KNEE ARTHROSCOPY Right    • VASECTOMY          Family History   Problem Relation Age of Onset   • Diabetes Mother    • Thyroid disease Mother    • Hypertension Father         Social History     Socioeconomic History   • Marital status: /Civil Union     Spouse name: Not on file   • Number of children: Not on file   • Years of education: Not on file   • Highest education level: Not on file   Occupational History   • Not on file   Tobacco Use   • Smoking status: Former     Packs/day: 2 00     Years: 20 00     Pack years: 40 00     Types: Cigarettes     Quit date:      Years since quittin 9   • Smokeless tobacco: Never   Vaping Use   • Vaping Use: Never used   Substance and Sexual Activity   • Alcohol use: Yes     Comment: rare on holidays   • Drug use: No   • Sexual activity: Yes     Partners: Female   Other Topics Concern   • Not on file   Social History Narrative        Employed in professional specialty position     Social Determinants of Health     Financial Resource Strain: Not on file   Food Insecurity: Not on file   Transportation Needs: Not on file   Physical Activity: Not on file   Stress: Not on file   Social Connections: Not on file   Intimate Partner Violence: Not on file   Housing Stability: Not on file        Physical Exam:      /90 (BP Location: Left arm, Patient Position: Sitting, Cuff Size: Standard) Comment: bp  Pulse 70   Temp 99 9 °F (37 7 °C) (Temporal) Comment: no  Ht 6' 3" (1 905 m)   Wt 116 kg (255 lb 12 8 oz)   SpO2 97%   BMI 31 97 kg/m²     Physical Exam  Vitals reviewed  Constitutional:       General: He is not in acute distress  Appearance: He is well-developed  He is not diaphoretic  HENT:      Head: Normocephalic and atraumatic  Eyes:      General: No scleral icterus  Right eye: No discharge  Left eye: No discharge  Conjunctiva/sclera: Conjunctivae normal       Pupils: Pupils are equal, round, and reactive to light  Neck:      Thyroid: No thyromegaly  Vascular: No JVD  Trachea: No tracheal deviation  Cardiovascular:      Rate and Rhythm: Normal rate and regular rhythm  Heart sounds: Normal heart sounds  No murmur heard  No friction rub  No gallop  Pulmonary:      Effort: Pulmonary effort is normal  No respiratory distress  Breath sounds: Normal breath sounds  No stridor  No wheezing or rales  Chest:      Chest wall: No tenderness  Abdominal:      General: Bowel sounds are normal  There is no distension  Palpations: Abdomen is soft  There is no mass  Tenderness: There is no abdominal tenderness  There is no guarding or rebound  Musculoskeletal:         General: No tenderness or deformity  Normal range of motion  Cervical back: Normal range of motion and neck supple  Lymphadenopathy:      Cervical: No cervical adenopathy  Skin:     General: Skin is warm and dry  Coloration: Skin is not pale  Findings: No erythema or rash     Neurological:      Mental Status: He is alert and oriented to person, place, and time  Cranial Nerves: No cranial nerve deficit  Motor: No abnormal muscle tone  Coordination: Coordination normal    Psychiatric:         Behavior: Behavior normal            Data:     Pre-operative work-up    Laboratory Results: I have personally reviewed the pertinent laboratory results/reports     EKG: I have personally reviewed pertinent reports  Assessment:     1  Pre-op examination  POCT hemoglobin A1c      2  Encounter for immunization        3  Encounter for screening for malignant neoplasm of colon             Plan:       Cardiac Risk Estimation: per the Revised Cardiac Risk Index (Circ  100:1043, 1999), the patient's risk factors for cardiac complications include N/A, putting him in: RCI RISK CLASS I (0 risk factors, risk of major cardiac compl  appr  0 5%)  1  Further preoperative workup as follows:   - None; no further preoperative work-up is required    2  Medication Management/Recommendations:   - Patient has been instructed to avoid herbs or non-directed vitamins the week prior to surgery to ensure no drug interactions with perioperative surgical and anesthetic medications  - Patient has been instructed to avoid aspirin containing medications or non-steroidal anti-inflammatory drugs for the week preceding surgery  3  Prophylaxis for cardiac events with perioperative beta-blockers: not indicated      4  Patient requires further consultation with: None    Clearance  BP elevation, adding metoprolol, will have a recheck in the office in 48 hours to assess for effectiveness then will determine if he is cleared     Lisseth Funk, [address] [address]  Phone#  [phone]  Fax#  [phone]

## 2022-12-21 NOTE — PRE-PROCEDURE INSTRUCTIONS
Pre-Surgery Instructions:   Medication Instructions   • ascorbic acid (VITAMIN C) 1000 MG tablet Stop taking 7 days prior to surgery  • [place] 6 MG TABS Stop taking 7 days prior to surgery  • cholecalciferol (VITAMIN D3) 1,000 units tablet Stop taking 7 days prior to surgery  • ibuprofen (MOTRIN) 200 mg tablet Stop taking 7 days prior to surgery  • multivitamin (THERAGRAN) TABS Stop taking 7 days prior to surgery  • NON FORMULARY Stop taking 7 days prior to surgery  • Omega-3 Fatty Acids (FISH OIL OMEGA-3 PO) Stop taking 7 days prior to surgery  Med list reviewed as above  Also instructed pt not to start any new vitamins/supplements preoperatively and to avoid NSAID's  7 days prior to surgery  Tylenol is acceptable if needed  Pt has and/or is getting CHG surgical soap and verbalizes understanding of preoperative showering protocol  All information within "My Surgical Experience" pamphlet reviewed and patient verbalizes understanding and compliance  All questions answered

## 2022-12-22 ENCOUNTER — TELEPHONE (OUTPATIENT)
Dept: OBGYN CLINIC | Facility: CLINIC | Age: 47
End: 2022-12-22

## 2022-12-22 ENCOUNTER — TELEPHONE (OUTPATIENT)
Dept: INTERNAL MEDICINE CLINIC | Facility: CLINIC | Age: 47
End: 2022-12-22

## 2022-12-22 LAB
ATRIAL RATE: 66 BPM
P AXIS: 47 DEGREES
PR INTERVAL: 172 MS
QRS AXIS: 13 DEGREES
QRSD INTERVAL: 140 MS
QT INTERVAL: 436 MS
QTC INTERVAL: 457 MS
T WAVE AXIS: 29 DEGREES
VENTRICULAR RATE: 66 BPM

## 2022-12-22 NOTE — TELEPHONE ENCOUNTER
Spoke to patient -- he will start BP meds today and he has a follow up on Tuesday at 800 am with Dr Denton Favre after appointment we will have to call ortho at [phone] if we can clear him for Wednesday surgery

## 2022-12-22 NOTE — TELEPHONE ENCOUNTER
----- Message from [id number] sent at 12/21/2022  4:55 PM EST -----  Regarding: BP  Please call patient and let him know that DR Toni Joe is suggesting we start you on a medication for the blood pressure  There is an 80 % chance we may need to post pone the surgery if we cant get the blood pressure lower until your surgery   Start the medication ASAP and schedule patient for the end of the week with me for a follow up

## 2022-12-22 NOTE — TELEPHONE ENCOUNTER
Caller: Xiomy Rose from Dr Almaguer Co office(PCP)    Doctor: Dr Riana Mcpherson    Reason for call: Xiomy Rose calling in stating that patient was placed on new blood pressure medication in an attempt to lower his blood pressure  They are going to recheck him on   12/27/22 and they will call the office to let Dr Riana Mcpherson know if patient can proceed with surgery  His appt is 12/27/22 at 8:30AM with Dr Werner Castorena        Call back#: [phone]

## 2022-12-27 ENCOUNTER — TELEPHONE (OUTPATIENT)
Dept: OBGYN CLINIC | Facility: HOSPITAL | Age: 47
End: 2022-12-27

## 2022-12-27 ENCOUNTER — TELEPHONE (OUTPATIENT)
Dept: INTERNAL MEDICINE CLINIC | Facility: CLINIC | Age: 47
End: 2022-12-27

## 2022-12-27 ENCOUNTER — OFFICE VISIT (OUTPATIENT)
Dept: INTERNAL MEDICINE CLINIC | Facility: CLINIC | Age: 47
End: 2022-12-27

## 2022-12-27 ENCOUNTER — ANESTHESIA EVENT (OUTPATIENT)
Dept: PERIOP | Facility: HOSPITAL | Age: 47
End: 2022-12-27

## 2022-12-27 VITALS
HEART RATE: 54 BPM | SYSTOLIC BLOOD PRESSURE: 138 MMHG | HEIGHT: 75 IN | WEIGHT: 255.2 LBS | BODY MASS INDEX: 31.73 KG/M2 | OXYGEN SATURATION: 97 % | TEMPERATURE: 98.3 F | DIASTOLIC BLOOD PRESSURE: 84 MMHG

## 2022-12-27 DIAGNOSIS — I10 ESSENTIAL HYPERTENSION: Primary | ICD-10-CM

## 2022-12-27 NOTE — TELEPHONE ENCOUNTER
Caller: Jael Arzola    Doctor: Chavo Castillo    Reason for call: Missed called from pre-op about what time to report tomorrow, they will call him back      Call back#: [phone]

## 2022-12-27 NOTE — PATIENT INSTRUCTIONS
ASA category 2 patient  Blood pressure controlled with metoprolol 50 p o  twice daily  He is considered optimized for the total arthroscopic knee procedure     He should continue the metoprolol during his surgical

## 2022-12-27 NOTE — PROGRESS NOTES
Assessment/Plan:       Diagnoses and all orders for this visit:    Essential hypertension                Subjective:      Patient ID: Angel Rose is a 50 y o  male  Seen here last week for a "presurgical "assessment  Procedures arthroscopic surgery left knee  Blood pressure was unacceptably high  He was asymptomatic; metoprolol was initiated  Comes in today for follow-up  Blood pressure quite well controlled on metoprolol 50 p o  twice daily  This should be continued in the perioperative period  He is considered optimized for the procedure which may go on with no provocative testing      The following portions of the patient's history were reviewed and updated as appropriate:   He has a past medical history of Hyperlipidemia and Obesity  ,  does not have any pertinent problems on file  ,   has a past surgical history that includes Vasectomy; Incision and drainage of wound (Right, 09/07/2017); Knee arthroscopy (Right, 2020); and Colonoscopy  ,  family history includes Diabetes in his mother; Hypertension in his father; Thyroid disease in his mother  ,   reports that he quit smoking about 12 years ago  His smoking use included cigarettes  He has a 40 00 pack-year smoking history  He has never used smokeless tobacco  He reports current alcohol use  He reports that he does not use drugs  ,  is allergic to loratadine and metformin     Current Outpatient Medications   Medication Sig Dispense Refill   • ADVOCATE LANCETS MISC by Does not apply route daily Patient Tests Once a Day   DX: E11 9 100 each 5   • ascorbic acid (VITAMIN C) 1000 MG tablet Take 1 tablet by mouth as needed On hold for surgery     • [place] 6 MG TABS Take by mouth On hold for surgery     • cholecalciferol (VITAMIN D3) 1,000 units tablet Take 5,000 Units by mouth daily On hold for surgery     • ibuprofen (MOTRIN) 200 mg tablet Take by mouth every 6 (six) hours as needed for mild pain On hold for surgery     • metoprolol tartrate (LOPRESSOR) 50 mg tablet Take 1 tablet (50 mg total) by mouth every 12 (twelve) hours 60 tablet 3   • multivitamin (THERAGRAN) TABS Take 1 tablet by mouth daily On hold for surgery     • NON FORMULARY Take by mouth in the morning Shilajit tablet, on hold for surgery     • Omega-3 Fatty Acids (FISH OIL OMEGA-3 PO) Take by mouth On hold for surgery       No current facility-administered medications for this visit  Review of Systems   Respiratory: Negative for shortness of breath and wheezing  Cardiovascular: Negative for chest pain and palpitations  Neurological: Negative for headaches  Objective:  Vitals:    12/27/22 0835   BP: 138/84   Pulse: (!) 54   Temp: 98 3 °F (36 8 °C)   SpO2: 97%      Physical Exam  Constitutional:       Appearance: Normal appearance  Cardiovascular:      Rate and Rhythm: Normal rate and regular rhythm  Pulmonary:      Effort: Pulmonary effort is normal       Breath sounds: Normal breath sounds  Neurological:      General: No focal deficit present  Mental Status: He is alert  Psychiatric:         Mood and Affect: Mood normal            Patient Instructions   ASA category 2 patient  Blood pressure controlled with metoprolol 50 p o  twice daily  He is considered optimized for the total arthroscopic knee procedure     He should continue the metoprolol during his surgical

## 2022-12-27 NOTE — TELEPHONE ENCOUNTER
Caller: Self    Doctor: Galina King    Reason for call: Patient wanted to let doctor know he was cleared by PCP, see AVS from 12/27/22    Call back#: Aimee Payne

## 2022-12-27 NOTE — TELEPHONE ENCOUNTER
Dr Corrales Shed has to modify is notes for Sarath's surg clearance letter  He is having a left knee arthroscopy not a knee replacement   thanks

## 2022-12-28 ENCOUNTER — TELEPHONE (OUTPATIENT)
Dept: OBGYN CLINIC | Facility: HOSPITAL | Age: 47
End: 2022-12-28

## 2022-12-28 ENCOUNTER — ANESTHESIA (OUTPATIENT)
Dept: PERIOP | Facility: HOSPITAL | Age: 47
End: 2022-12-28

## 2022-12-28 ENCOUNTER — HOSPITAL ENCOUNTER (OUTPATIENT)
Facility: HOSPITAL | Age: 47
Setting detail: OUTPATIENT SURGERY
Discharge: HOME | End: 2022-12-28
Attending: ORTHOPAEDIC SURGERY | Admitting: ORTHOPAEDIC SURGERY

## 2022-12-28 VITALS
TEMPERATURE: 97.7 F | HEIGHT: 75 IN | RESPIRATION RATE: 16 BRPM | SYSTOLIC BLOOD PRESSURE: 145 MMHG | OXYGEN SATURATION: 99 % | WEIGHT: 253.53 LBS | DIASTOLIC BLOOD PRESSURE: 88 MMHG | BODY MASS INDEX: 31.52 KG/M2 | HEART RATE: 51 BPM

## 2022-12-28 DIAGNOSIS — S83.242D OTHER TEAR OF MEDIAL MENISCUS OF LEFT KNEE AS CURRENT INJURY, SUBSEQUENT ENCOUNTER: Primary | ICD-10-CM

## 2022-12-28 DIAGNOSIS — S83.272D COMPLEX TEAR OF LATERAL MENISCUS OF LEFT KNEE AS CURRENT INJURY, SUBSEQUENT ENCOUNTER: ICD-10-CM

## 2022-12-28 RX ORDER — ASPIRIN 81 MG/1
81 TABLET ORAL 2 TIMES DAILY
Qty: 28 TABLET | Refills: 0 | Status: SHIPPED | OUTPATIENT
Start: 2022-12-28

## 2022-12-28 RX ORDER — CHLORHEXIDINE GLUCONATE 4 G/100ML
SOLUTION TOPICAL DAILY PRN
Status: DISCONTINUED | OUTPATIENT
Start: 2022-12-28 | End: 2022-12-28 | Stop reason: HOSPADM

## 2022-12-28 RX ORDER — IBUPROFEN 800 MG/1
800 TABLET ORAL EVERY 8 HOURS PRN
Qty: 30 TABLET | Refills: 0 | Status: SHIPPED | OUTPATIENT
Start: 2022-12-28

## 2022-12-28 RX ORDER — EPHEDRINE SULFATE 50 MG/ML
INJECTION INTRAVENOUS AS NEEDED
Status: DISCONTINUED | OUTPATIENT
Start: 2022-12-28 | End: 2022-12-28

## 2022-12-28 RX ORDER — IBUPROFEN 400 MG/1
800 TABLET ORAL ONCE
Status: DISCONTINUED | OUTPATIENT
Start: 2022-12-28 | End: 2022-12-28 | Stop reason: HOSPADM

## 2022-12-28 RX ORDER — ONDANSETRON 2 MG/ML
4 INJECTION INTRAMUSCULAR; INTRAVENOUS ONCE AS NEEDED
Status: DISCONTINUED | OUTPATIENT
Start: 2022-12-28 | End: 2022-12-28 | Stop reason: HOSPADM

## 2022-12-28 RX ORDER — DEXAMETHASONE SODIUM PHOSPHATE 10 MG/ML
INJECTION, SOLUTION INTRAMUSCULAR; INTRAVENOUS AS NEEDED
Status: DISCONTINUED | OUTPATIENT
Start: 2022-12-28 | End: 2022-12-28

## 2022-12-28 RX ORDER — PROPOFOL 10 MG/ML
INJECTION, EMULSION INTRAVENOUS AS NEEDED
Status: DISCONTINUED | OUTPATIENT
Start: 2022-12-28 | End: 2022-12-28

## 2022-12-28 RX ORDER — GLYCOPYRROLATE 0.2 MG/ML
INJECTION INTRAMUSCULAR; INTRAVENOUS AS NEEDED
Status: DISCONTINUED | OUTPATIENT
Start: 2022-12-28 | End: 2022-12-28

## 2022-12-28 RX ORDER — SODIUM CHLORIDE, SODIUM LACTATE, POTASSIUM CHLORIDE, CALCIUM CHLORIDE 600; 310; 30; 20 MG/100ML; MG/100ML; MG/100ML; MG/100ML
125 INJECTION, SOLUTION INTRAVENOUS CONTINUOUS
Status: DISCONTINUED | OUTPATIENT
Start: 2022-12-28 | End: 2022-12-28 | Stop reason: HOSPADM

## 2022-12-28 RX ORDER — FENTANYL CITRATE/PF 50 MCG/ML
25 SYRINGE (ML) INJECTION
Status: DISCONTINUED | OUTPATIENT
Start: 2022-12-28 | End: 2022-12-28 | Stop reason: HOSPADM

## 2022-12-28 RX ORDER — FENTANYL CITRATE 50 UG/ML
INJECTION, SOLUTION INTRAMUSCULAR; INTRAVENOUS AS NEEDED
Status: DISCONTINUED | OUTPATIENT
Start: 2022-12-28 | End: 2022-12-28

## 2022-12-28 RX ORDER — HYDROMORPHONE HCL/PF 1 MG/ML
0.2 SYRINGE (ML) INJECTION
Status: DISCONTINUED | OUTPATIENT
Start: 2022-12-28 | End: 2022-12-28 | Stop reason: HOSPADM

## 2022-12-28 RX ORDER — MIDAZOLAM HYDROCHLORIDE 2 MG/2ML
INJECTION, SOLUTION INTRAMUSCULAR; INTRAVENOUS AS NEEDED
Status: DISCONTINUED | OUTPATIENT
Start: 2022-12-28 | End: 2022-12-28

## 2022-12-28 RX ORDER — LIDOCAINE HYDROCHLORIDE 10 MG/ML
INJECTION, SOLUTION EPIDURAL; INFILTRATION; INTRACAUDAL; PERINEURAL AS NEEDED
Status: DISCONTINUED | OUTPATIENT
Start: 2022-12-28 | End: 2022-12-28

## 2022-12-28 RX ORDER — ONDANSETRON 2 MG/ML
4 INJECTION INTRAMUSCULAR; INTRAVENOUS EVERY 6 HOURS PRN
Status: DISCONTINUED | OUTPATIENT
Start: 2022-12-28 | End: 2022-12-28 | Stop reason: HOSPADM

## 2022-12-28 RX ORDER — OXYCODONE HYDROCHLORIDE AND ACETAMINOPHEN 5; 325 MG/1; MG/1
1 TABLET ORAL ONCE
Status: COMPLETED | OUTPATIENT
Start: 2022-12-28 | End: 2022-12-28

## 2022-12-28 RX ORDER — BUPIVACAINE HYDROCHLORIDE 5 MG/ML
INJECTION, SOLUTION EPIDURAL; INTRACAUDAL AS NEEDED
Status: DISCONTINUED | OUTPATIENT
Start: 2022-12-28 | End: 2022-12-28 | Stop reason: HOSPADM

## 2022-12-28 RX ORDER — CEFAZOLIN SODIUM 2 G/50ML
2000 SOLUTION INTRAVENOUS ONCE
Status: COMPLETED | OUTPATIENT
Start: 2022-12-28 | End: 2022-12-28

## 2022-12-28 RX ORDER — ONDANSETRON 4 MG/1
4 TABLET, FILM COATED ORAL EVERY 8 HOURS PRN
Qty: 20 TABLET | Refills: 0 | Status: SHIPPED | OUTPATIENT
Start: 2022-12-28

## 2022-12-28 RX ORDER — OXYCODONE HYDROCHLORIDE AND ACETAMINOPHEN 5; 325 MG/1; MG/1
1 TABLET ORAL EVERY 4 HOURS PRN
Qty: 7 TABLET | Refills: 0 | Status: SHIPPED | OUTPATIENT
Start: 2022-12-28

## 2022-12-28 RX ORDER — ONDANSETRON 2 MG/ML
INJECTION INTRAMUSCULAR; INTRAVENOUS AS NEEDED
Status: DISCONTINUED | OUTPATIENT
Start: 2022-12-28 | End: 2022-12-28

## 2022-12-28 RX ADMIN — CEFAZOLIN SODIUM 2000 MG: 2 SOLUTION INTRAVENOUS at 07:51

## 2022-12-28 RX ADMIN — GLYCOPYRROLATE 0.2 MCG: 0.2 INJECTION, SOLUTION INTRAMUSCULAR; INTRAVENOUS at 08:15

## 2022-12-28 RX ADMIN — LIDOCAINE HYDROCHLORIDE 50 MG: 10 INJECTION, SOLUTION EPIDURAL; INFILTRATION; INTRACAUDAL at 07:49

## 2022-12-28 RX ADMIN — SODIUM CHLORIDE, POTASSIUM CHLORIDE, SODIUM LACTATE AND CALCIUM CHLORIDE 125 ML/HR: 600; 310; 30; 20 INJECTION, SOLUTION INTRAVENOUS at 06:56

## 2022-12-28 RX ADMIN — PROPOFOL 250 MG: 10 INJECTION, EMULSION INTRAVENOUS at 07:49

## 2022-12-28 RX ADMIN — GLYCOPYRROLATE 0.2 MCG: 0.2 INJECTION, SOLUTION INTRAMUSCULAR; INTRAVENOUS at 08:12

## 2022-12-28 RX ADMIN — DEXAMETHASONE SODIUM PHOSPHATE 10 MG: 10 INJECTION, SOLUTION INTRAMUSCULAR; INTRAVENOUS at 07:57

## 2022-12-28 RX ADMIN — ONDANSETRON 4 MG: 2 INJECTION INTRAMUSCULAR; INTRAVENOUS at 07:57

## 2022-12-28 RX ADMIN — FENTANYL CITRATE 50 MCG: 50 INJECTION INTRAMUSCULAR; INTRAVENOUS at 07:49

## 2022-12-28 RX ADMIN — OXYCODONE HYDROCHLORIDE AND ACETAMINOPHEN 1 TABLET: 5; 325 TABLET ORAL at 09:34

## 2022-12-28 RX ADMIN — MIDAZOLAM 2 MG: 1 INJECTION INTRAMUSCULAR; INTRAVENOUS at 07:43

## 2022-12-28 RX ADMIN — FENTANYL CITRATE 50 MCG: 50 INJECTION INTRAMUSCULAR; INTRAVENOUS at 08:08

## 2022-12-28 RX ADMIN — EPHEDRINE SULFATE 10 MG: 50 INJECTION, SOLUTION INTRAVENOUS at 08:18

## 2022-12-28 NOTE — INTERVAL H&P NOTE
H&P reviewed  After examining the patient I find no changes in the patients condition since the H&P had been written  Patient was seen and evaluated in the office where continued nonoperative vs surgical management of Left knee lateral meniscus tear was discussed  In light of patients persistent pain despite injections and therapy, swelling, patient would like to move forward with surgical intervention  Risks of surgical intervention discussed in the office with patient and detailed consent obtained  Patient will go to OR on 12/28/2022 with Dr Uli Condon for Left knee arthroscopic partial lateral menisectomy, all associated procedures        14 point ROS in office   Musculoskeletal Left knee pain      Heart RRR  Lungs CTA BL        Vitals:    12/28/22 0650   BP: 166/86   Pulse: (!) 45   Resp: 16   Temp: 97 7 °F (36 5 °C)   SpO2: 99%

## 2022-12-28 NOTE — OP NOTE
OPERATIVE REPORT  PATIENT NAME: Anais Dozier    :  1975  MRN: [id number]  Pt Location: [facility] OR ROOM 04    SURGERY DATE: 2022    Surgeon(s) and Role:     * Alex Osuna DO - Primary     * Alyce Jurado PA-C - Assisting    Preop Diagnosis:  Complex tear of lateral meniscus of left knee as current injury, initial encounter [S83 272A]    Post-Op Diagnosis Codes:     * Complex tear of lateral meniscus of left knee as current injury, initial encounter [S83 272A]   Complex tear medial meniscus  Osteoarthritis left knee    Procedure(s) (LRB):  ARTHROSCOPY KNEE- Left Knee partial lateral and medial menisectomy (Left)    Specimen(s):  * No specimens in log *    Estimated Blood Loss:   Minimal    Drains:  * No LDAs found *    Anesthesia Type:   General    Operative Indications:  Complex tear of lateral meniscus of left knee as current injury, initial encounter [S83 272A]  Acute onset assistant pain and mechanical symptoms after twisting injury despite home exercise program, activity modification, and oral analgesics    Operative Findings:  Complex tear medial and lateral menisci of left knee with tricompartmental degenerative changes    Complications:   None    Procedure and Technique:    Antibiotics: 2 g Ancef  IV Fluids: 1 L crystalloid      The patient was seen in the preoperative holding area and the appropriate site of surgery was confirmed and marked  Upon bringing the patient back to the operating room he was placed supine on the operating room table  A tourniquet was placed on the thigh of the operative leg and the leg was placed in the appropriate leg bauer  The well leg was placed on a well-padded pillow  Esmarch was used to exsanguinate the leg and tourniquet was inflated to 250 mmHg  The left lower extremity was prepped and draped in the usual sterile fashion  An appropriate preoperative time-out was performed to once again confirm the site of surgery and procedure      A lateral incision was made for an arthroscopic viewing portal and the arthroscopic camera was inserted  A diagnostic arthroscopy was performed and displayed 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  A medial arthroscopic portal was made under direct visualization with the aid of an 18 gauge spinal needle  An arthroscopic probe was inserted and the remainder of the diagnostic arthroscopy was performed which further showed complex tear posterior horn of medial meniscus with undersurface component and loose flap projecting inferiorly from the posterior horn body junction  Small areas of grade II chondromalacia were noted over the tibia and medial femoral condyle without loose flaps  Upon moving to intercondylar notch, ACL was noted to be intact  Upon moving to the lateral compartment diffuse grade 2/3 degenerative change was noted over the medial femoral condyle with grade 2 change over the anterior tibia  Complex tear of lateral meniscus was noted with unstable flap portion at posterior horn with radial component at posterior horn/body junction and a secondary unstable flap in the body portion  Attention was turned first to medial meniscus tear  An arthroscopic shaver was inserted to debride loose ends of the undersurface component of the posterior horn meniscal tear  Arthroscopic biter was also used to further remove the torn undersurface and create a nice smooth contour  This was further debrided with an arthroscopic shaver once again  Probing revealed residual meniscus to be stable  Attention was then turned to the lateral meniscus tear  Arthroscopic shaver was used to debride loose unstable ends of both the posterior horn and body portions of the tears  Arthroscopic biter was then used to further debride the remaining posterior horn to stable tissue  Arthroscopic shaver was used to remove loose meniscal fragments  Upon probing remainder of the meniscus was noted to be stable    Approximately two thirds of the posterior horn and nearly the posterior one third of the body were deficient due to the large unstable nature of the meniscus tear  The arthroscopic probe was once again inserted to ensure stable edges of the meniscus status post meniscectomy  No unstable loose edges of articular cartilage were noted  Arthroscopic incisions were closed with 3-0 nylon suture  20 cc of 0 5% Marcaine were used as local anesthetic  A sterile dressing was applied  The patient tolerated the procedure well no complications were noted  The patient was transferred to the PACU without any issue  I was present for the entire procedure, A qualified resident physician was not available and A physician assistant, Kamron Sky PA-C, was required during the procedure for assistance with arthroscopic camera and equipment due to the minimally invasive nature of the surgical case and suturing      Patient Disposition:  PACU         SIGNATURE: Angela Maloney DO  DATE: December 28, 2022  TIME: 8:45 AM

## 2022-12-28 NOTE — DISCHARGE INSTR - AVS FIRST PAGE
Knee Surgery: Postoperative Instructions  Dr Lieutenant Redman may resume your regular diet as soon as possible  Medication    Take the pain medication as prescribed   Take pain medication with food   While taking pain medications, you may NOT operate a vehicle, heavy machinery, or appliances   While taking pain medication, you may NOT drink alcoholic beverages   If you have any reactions to your medications, stop taking them and call my office   If you are not allergic, take one aspirin 81mg twice a day to help prevent blood clots   Please keep in mind that constipation is a very common side effect of taking narcotic pain medication  Take precautions to prevent constipation:  o Drink plenty of water (6-8 glasses of 8 oz  a day)  o Avoid alcohol, caffeine, and dairy products  o Eat plenty of fiber (fruits, vegetables, and whole grains)  o Take an over the counter stool softener (Colace or Dulcolax)  Activity    RANGE OF MOTION   _____ You may bend your knee as much as the dressings will allow     WEIGHT BEARING   _____ You may weight bear as tolerated     You may practice quadriceps muscle tightening and straight leg raises several times every hour  Please continue to move your ankle up and down and tighten and relax your calf muscles several times every hour to help reduce swelling and prevent blood clots  You may use your crutches for balance as needed until your first post operative visit  The optimal position of the leg after surgery is for you to be lying flat with your ankle higher than your knee and higher than your heart, in an effort to reduce swelling  It is important to continuously elevate your knee above your heart until your swelling is completely down  Please keep ice applied to the knee for at least the first 72 hours or as long as pain or swelling persists  Do not apply ice directly to skin, or allow water to leak on your dressing    Showering    You may shower 4 days after surgery unless told otherwise  DO NOT immerse the knee under water and DO NOT rub the incision  Pad the incisions dry and place new Band-Aids over the sutures after showering  If you have a brace, you may remove it to shower  Keep your knee straight in the shower  You may sponge bathe for the first 3 days, taking care to keep the dressing clean and dry  Dressing Care    Keep the dressing clean and dry  It is normal to get some bloody wound seepage through the bandage  DO NOT BE ALARMED  If the dressing gets soaked with wound seepage, please reinforce with a dry sterile dressing  Loosen the ace wrap around your knee if it becomes too tight or painful  Remove all dressings 3 days after surgery  If there is still some wound seepage, apply a fresh STERILE gauze over the incisions and secure with tape or an ace wrap  DO NOT TOUCH OR REMOVE THE SUTURES!! Emergency/Follow-Up   Please notify my office at [phone] if you develop any fever (101 deg or above), unexpected warmth, redness or swelling  Please call if your toes become cold, purple, numb, or there is excessive bleeding  Please call the office within 24 hours at [phone] to schedule a follow up appt within 7-10 days from surgery

## 2022-12-28 NOTE — TELEPHONE ENCOUNTER
Tried to contact patient to make him aware that yes he has been cleared by PCP     Patient did not answer and I could not leave a message

## 2022-12-28 NOTE — ANESTHESIA PREPROCEDURE EVALUATION
Procedure:  ARTHROSCOPY KNEE- Left Knee partial lateral menisectomy, all associated procedures (Knee)    Relevant Problems   CARDIO   (+) Essential hypertension   (+) Hyperlipidemia      GI/HEPATIC   (+) Steatosis of liver        Physical Exam    Airway    Mallampati score: II  TM Distance: >3 FB  Neck ROM: full     Dental   No notable dental hx     Cardiovascular  Rhythm: regular, Rate: normal,     Pulmonary  Pulmonary exam normal Breath sounds clear to auscultation,     Other Findings        Anesthesia Plan  ASA Score- 2     Anesthesia Type- general with ASA Monitors  Additional Monitors:   Airway Plan: LMA  Plan Factors-Exercise tolerance (METS): >4 METS  Chart reviewed  Existing labs reviewed  Patient summary reviewed  Patient is not a current smoker  Induction- intravenous  Postoperative Plan-     Informed Consent- Anesthetic plan and risks discussed with patient  I personally reviewed this patient with the CRNA  Discussed and agreed on the Anesthesia Plan with the CRNA  Ben Rivers

## 2022-12-28 NOTE — TELEPHONE ENCOUNTER
Caller: Patient     Doctor: Magdy Pagn    Reason for call: Should patient wait to schedule PT until after first PO appt?     Call back#: [phone]

## 2022-12-28 NOTE — ANESTHESIA POSTPROCEDURE EVALUATION
Post-Op Assessment Note    CV Status:  Stable    Pain management: adequate     Mental Status:  Alert and awake   Hydration Status:  Euvolemic   PONV Controlled:  Controlled   Airway Patency:  Patent      Post Op Vitals Reviewed: Yes      Staff: CRNA         No notable events documented      BP   146/79   Temp   97 6   Pulse 62   Resp   14   SpO2   98

## 2022-12-29 ENCOUNTER — EVALUATION (OUTPATIENT)
Dept: PHYSICAL THERAPY | Facility: CLINIC | Age: 47
End: 2022-12-29

## 2022-12-29 DIAGNOSIS — S83.272D COMPLEX TEAR OF LATERAL MENISCUS OF LEFT KNEE AS CURRENT INJURY, SUBSEQUENT ENCOUNTER: ICD-10-CM

## 2022-12-29 DIAGNOSIS — S83.242D OTHER TEAR OF MEDIAL MENISCUS OF LEFT KNEE AS CURRENT INJURY, SUBSEQUENT ENCOUNTER: Primary | ICD-10-CM

## 2022-12-29 NOTE — PROGRESS NOTES
PT Evaluation     Today's date: 2022  Patient name: Trenton Randall  : 1975  MRN: [id number]  Referring provider: Daniel Gómez PA-C  Dx:   Encounter Diagnosis     ICD-10-CM    1  Other tear of medial meniscus of left knee as current injury, subsequent encounter  S83 242D Ambulatory Referral to Physical Therapy      2  Complex tear of lateral meniscus of left knee as current injury, subsequent encounter  S83 272D Ambulatory Referral to Physical Therapy                     Assessment  Assessment details: Patient was provided a home exercise program and demonstrated an understanding of exercises  Patient was advised to stop performing home exercise program if symptoms increase or new complaints developed  Verbal understanding demonstrated regarding home exercise program instructions  Patient would benefit from skilled physical therapy services for prescribed exercises, manual interventions, neuromuscular re-education, education, and modalities as deemed appropriate to assist patient in achieving their maximum level of function  Patient presents in good standing s/p left knee medial/ lateral menisectomy 22  He has surgical wrap on which was removed revealing 2 portal holes, (-) drainage, mild redness, mild warmth, mild effusion  He presents with good quad contraction left, end range flexion movement loss, full extension, ambulating with normal non-antalgic gait pattern  He is returning to work this  in [employer] but 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  MD follow up is -  Impairments: abnormal or restricted ROM, activity intolerance, impaired physical strength, lacks appropriate home exercise program and pain with function  Understanding of Dx/Px/POC: good  Goals  STG   1  Patient will demonstrate independence and competence with HEP 2 -4 weeks  2  Competent with HEP  2-4 weeks  LTG   1    Patient will report improvements with both functional and recreational abilities  4-6 weeks  2  Patient will demonstrate improved motor function  4-6 weeks  3   Restoration of full left knee AROM  3-5 weeks  Plan  Plan details: Patient response to treatment will be monitored each session and progressed accordingly    Thank you for this referral    Patient would benefit from: skilled physical therapy  Planned modality interventions: cryotherapy  Planned therapy interventions: IADL retraining, manual therapy, patient education, postural training, strengthening, stretching, therapeutic exercise, flexibility, home exercise program and neuromuscular re-education  Frequency: 1-2x/week  Duration in weeks: 4  Treatment plan discussed with: patient        Subjective Evaluation    History of Present Illness  Mechanism of injury: S/p left knee arthroscopic surgery for both medial and lateral menisci  He notes that his pain started summer 2022 - likely due to martial arts  Pain  Current pain ratin  Quality: dull ache  Progression: improved    Social Support  Lives with: spouse and young children    Working: going back  - [employer]         Objective     Observations   Left Knee   Positive for effusion       Neurological Testing     Sensation     Knee   Left Knee   Intact: light touch    Right Knee   Intact: light touch     Active Range of Motion   Left Knee   Flexion: 125 degrees   Extension: 0 degrees     Right Knee   Flexion: 143 degrees   Extension: 0 degrees     Additional Active Range of Motion Details  Tightness end range flexion Left     Strength/Myotome Testing     Left Knee   Flexion: 4+  Extension: 4+  Quadriceps contraction: good    Right Knee   Normal strength  Quadriceps contraction: good    General Comments:      Knee Comments  Girth - sup patella R = 46 0  L =47 4             Inf patella R = 42 0  L = 44 5 cm     GAIT - non-antalgic heel - toe progression  WBAT Left                Precautions: s/p left knee arthroscopic surgery 22 for both medial and lateral menisectomy     [email or web address] Access Code: [facility]        Manuals 12/29            FOTO db                         AROM 0-125                         PROM prn                                        Neuro Re-Ed             Qs 3s x 20            Add sets                                                                               Ther Ex             Bike  10 min            SLR  3s x 20            S/l abd 20x             saq                          Stand HR                                                                                                                                  Ther Activity                                       Gait Training                                       Modalities             CP left knee 10 min

## 2023-01-04 ENCOUNTER — OFFICE VISIT (OUTPATIENT)
Dept: PHYSICAL THERAPY | Facility: CLINIC | Age: 48
End: 2023-01-04

## 2023-01-04 DIAGNOSIS — S83.242D OTHER TEAR OF MEDIAL MENISCUS OF LEFT KNEE AS CURRENT INJURY, SUBSEQUENT ENCOUNTER: Primary | ICD-10-CM

## 2023-01-04 DIAGNOSIS — S83.272D COMPLEX TEAR OF LATERAL MENISCUS OF LEFT KNEE AS CURRENT INJURY, SUBSEQUENT ENCOUNTER: ICD-10-CM

## 2023-01-04 NOTE — PROGRESS NOTES
Daily Note     Today's date: 2023  Patient name: Chemo Cortes  : 1975  MRN: [id number]  Referring provider: Kvng Wilder PA-C  Dx:   Encounter Diagnosis     ICD-10-CM    1  Other tear of medial meniscus of left knee as current injury, subsequent encounter  S83 242D       2  Complex tear of lateral meniscus of left knee as current injury, subsequent encounter  S83 272D                      Subjective: Patient reports some knee pain if he moves his leg into ER/IR  Has f/u with surgeon  tomorrow  Objective: See treatment diary below      Assessment: Initiated POC as charted  Due to patient RTW status activity tolerance, focused on functional exercise  Exhibits good proprioception with squats and able to perform at increase ROM as he went on  Some discomfort with initial push off with step ups and lunges  Good form with standing hip series  Patient was able to increase resistance on leg press for an appropriate challenge  ROM WFL  Patient demonstrated fatigue post treatment, exhibited good technique with therapeutic exercises and would benefit from continued PT      Plan: Progress treatment as tolerated  Precautions: s/p left knee arthroscopic surgery 22 for both medial and lateral menisectomy     [employer] Access Code: [facility]        Manuals 23           FOTO db                         AROM 0-125                         PROM prn                                        Neuro Re-Ed             Qs 3s x 20            Add sets              TKE  GTB 10"x10           Biodex squats  Wt shift 5"x20           Rebounder  NV?                                      Ther Ex             Bike  10 min 10 min lvl 5           SLR  3s x 20 standing           S/l abd 20x  standing           saq             Mini lunge  L only 2x10           Stand HR             Stand hip 3 way   2x10 ea way bilat RTB           Leg press  125#  10x  145# 10x Ther Activity             FSU/LSU  FSU 2x10 8" At biodex?           Lat tap down  2x10 8"           Gait Training                                       Modalities             CP left knee 10 min At home

## 2023-01-05 ENCOUNTER — OFFICE VISIT (OUTPATIENT)
Dept: OBGYN CLINIC | Facility: CLINIC | Age: 48
End: 2023-01-05

## 2023-01-05 VITALS
HEART RATE: 58 BPM | WEIGHT: 253 LBS | SYSTOLIC BLOOD PRESSURE: 165 MMHG | DIASTOLIC BLOOD PRESSURE: 93 MMHG | HEIGHT: 75 IN | BODY MASS INDEX: 31.46 KG/M2

## 2023-01-05 DIAGNOSIS — Z47.89 AFTERCARE FOLLOWING SURGERY OF THE MUSCULOSKELETAL SYSTEM: ICD-10-CM

## 2023-01-05 DIAGNOSIS — Z98.890 S/P ARTHROSCOPIC PARTIAL MEDIAL MENISCECTOMY: ICD-10-CM

## 2023-01-05 DIAGNOSIS — Z98.890 S/P ARTHROSCOPIC PARTIAL LATERAL MENISCECTOMY: Primary | ICD-10-CM

## 2023-01-05 DIAGNOSIS — I10 ESSENTIAL HYPERTENSION: ICD-10-CM

## 2023-01-05 RX ORDER — METOPROLOL TARTRATE 50 MG/1
50 TABLET, FILM COATED ORAL EVERY 12 HOURS SCHEDULED
Qty: 60 TABLET | Refills: 0 | Status: SHIPPED | OUTPATIENT
Start: 2023-01-05

## 2023-01-05 NOTE — PROGRESS NOTES
Patient Name:  Moraima Cary  MRN:  [id number]    Assessment & Plan     1  S/P arthroscopic partial lateral meniscectomy    2  S/P arthroscopic partial medial meniscectomy    3  Aftercare following surgery of the musculoskeletal system      50 y o  male approximately 8  day s/p Left  knee arthroscopy with partial lateral and medial menisectomy performed on 12/28/22  The patient's intraoperative images were reviewed today  Sutures were removed  The patient was advised to continue with physical therapy as scheduled  The patient was instructed to wash the incisions with warm soapy water in the shower and to avoid vigorous scrubbing  He was told he should wait 2 weeks before pivoting and bouncing  He may ease into kicks on the heavy bag  After 2 weeks of progression of kicks, he may ease into full kicks  I will see the patient back in approximately 4 weeks for re-evaluation and range of motion check  History of the Present Illness   Moraima Cary is a 50 y o  male approximately 8  day s/p Left  knee arthroscopy with partial lateral and medial menisectomy performed on 12/28/22  He is doing well overall  He has 3 more physical therapy sessions scheduled and plans to keep going  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  Review of Systems     Review of Systems   Constitutional: Negative for appetite change and unexpected weight change  HENT: Negative for congestion and trouble swallowing  Eyes: Negative for visual disturbance  Respiratory: Negative for cough and shortness of breath  Cardiovascular: Negative for chest pain and palpitations  Gastrointestinal: Negative for nausea and vomiting  Endocrine: Negative for cold intolerance and heat intolerance  Musculoskeletal: Negative for joint swelling and myalgias  Skin: Positive for wound  Negative for rash  Neurological: Negative for numbness         Physical Exam     /93   Pulse 58   Ht 6' 3" (1 905 m)   Wt 115 kg (253 lb) BMI 31 62 kg/m²     Left  Knee  Surgical incisions well healing, without signs of infection  Erythema without drainage from the incisions  Sutures were removed  Range of motion from 0 to 130  There is mild effusion  The patient is able to perform a straight leg raise with 5/5 quad strength  Calf soft, compressible and nontender   The patient is neurovascular intact distally  Data Review     I have personally reviewed pertinent films in PACS, and my interpretation follows  No new images today  Intraoperative images were reviewed with the patient  Social History     Tobacco Use   • Smoking status: Former     Packs/day: 2 00     Years: 20 00     Pack years: 40 00     Types: Cigarettes     Quit date:      Years since quittin 0   • Smokeless tobacco: Never   Vaping Use   • Vaping Use: Never used   Substance Use Topics   • Alcohol use: Yes     Comment: rare on holidays   • Drug use: No           Procedures  Sutures removed       Marquez Salinas   Scribe Attestation    I,:  Marquez Salinas am acting as a scribe while in the presence of the attending physician :       I,:  Nikolas Graham DO personally performed the services described in this documentation    as scribed in my presence :

## 2023-01-09 ENCOUNTER — OFFICE VISIT (OUTPATIENT)
Dept: PHYSICAL THERAPY | Facility: CLINIC | Age: 48
End: 2023-01-09

## 2023-01-09 DIAGNOSIS — S83.272D COMPLEX TEAR OF LATERAL MENISCUS OF LEFT KNEE AS CURRENT INJURY, SUBSEQUENT ENCOUNTER: ICD-10-CM

## 2023-01-09 DIAGNOSIS — S83.242D OTHER TEAR OF MEDIAL MENISCUS OF LEFT KNEE AS CURRENT INJURY, SUBSEQUENT ENCOUNTER: Primary | ICD-10-CM

## 2023-01-09 NOTE — PROGRESS NOTES
Daily Note     Today's date: 2023  Patient name: Roosevelt Farnsworth  : 1975  MRN: [id number]  Referring provider: Shaista Gutierrez PA-C  Dx:   Encounter Diagnosis     ICD-10-CM    1  Other tear of medial meniscus of left knee as current injury, subsequent encounter  S83 242D       2  Complex tear of lateral meniscus of left knee as current injury, subsequent encounter  S83 272D                      Subjective: Patient reports that MD follow up last week went well, the doctor is very pleased with his progress thus far  Reporting no difficulty with btw activities  Feels good post session he states    Objective: See treatment diary below      Assessment: tolerated progression well overall - side stepping with TB to R = mild lateral shortlived irritation per patient  Plan: Progress treatment as tolerated  Precautions: s/p left knee arthroscopic surgery 22 for both medial and lateral menisectomy     [facility] Access Code: [email or web address]        ESCDSFQ 23          FOTO db                         AROM 0-125  0-134                       PROM prn                                        Neuro Re-Ed             Qs 3s x 20  HEP                       TKE  GTB 10"x10 gtb 10s x 20          Biodex squats  Wt shift 5"x20 Wt bearing 1' x 3 sets           Rebounder  NV?  Firm/ foam ymb x 20 ea                                     Ther Ex             Bike  10 min 10 min lvl 5 10 min L8                       Heel walk fwd/back   nv                       Mini lunge  L only 2x10           Stand HR   2 5# x 20           Stand hip 3 way   2x10 ea way bilat RTB 2 5# x20 ea  abd/ext          Leg press  125#  10x  145# 10x 135# 10 x 3           TB sidestepping   btb 30' x 3                        hss w/ strap   20s x 5          Heel slide w/ strap   15s x 5                                                  Ther Activity             FSU/LSU  FSU 2x10 8" 8" x 20 ea           Lat tap down  2x10 8" 6" x 20           Gait Training                                       Modalities             CP left knee 10 min At home def

## 2023-01-11 ENCOUNTER — OFFICE VISIT (OUTPATIENT)
Dept: PHYSICAL THERAPY | Facility: CLINIC | Age: 48
End: 2023-01-11

## 2023-01-11 DIAGNOSIS — S83.272D COMPLEX TEAR OF LATERAL MENISCUS OF LEFT KNEE AS CURRENT INJURY, SUBSEQUENT ENCOUNTER: Primary | ICD-10-CM

## 2023-01-11 DIAGNOSIS — S83.242D OTHER TEAR OF MEDIAL MENISCUS OF LEFT KNEE AS CURRENT INJURY, SUBSEQUENT ENCOUNTER: ICD-10-CM

## 2023-01-11 NOTE — PROGRESS NOTES
Daily Note     Today's date: 2023  Patient name: Shira Lentz  : 1975  MRN: [id number]  Referring provider: Louise Moran PA-C  Dx:   Encounter Diagnosis     ICD-10-CM    1  Complex tear of lateral meniscus of left knee as current injury, subsequent encounter  S83 272D       2  Other tear of medial meniscus of left knee as current injury, subsequent encounter  S83 242D           Start Time: 0734          Subjective: Patient reported returning to work next week and his hours will be changing and won't be available until late evening  Patient denied ankle discomfort upon presentation and only noted discomfort of left ankle with step down performance today  Patient also stated he was challenged with present exercise treatment, noting muscular fatigue of gluteals post session  Objective: See treatment diary below      Assessment: Tolerated treatment with vc's for posturing and technique occasionally (30%)    Patient demonstrated fatigue post treatment and would benefit from continued PT for left LE strengthening to improve function and ability to perform ADL's  Plan: Continue per plan of care  Progress treatment as tolerated  Precautions: s/p left knee arthroscopic surgery 22 for both medial and lateral menisectomy     [email or web address] APR Access Code: [facility]        Manuals 23         FOTO db                         AROM 0-125  0-134 0-138                      PROM prn                                        Neuro Re-Ed             Qs 3s x 20  HEP                       TKE  GTB 10"x10 gtb 10s x 20 GTB  :02  30x         Biodex squats  Wt shift 5"x20 Wt bearing 1' x 3 sets  held         Rebounder  NV?  Firm/ foam ymb x 20 ea  held                                   Ther Ex             Bike  10 min 10 min lvl 5 10 min L8 10'  L10                      Heel walk fwd/back   nv 3 laps (20') each                      Mini lunge  L only 2x10           Stand HR   2 5# x 20  3#  20x         Stand hip 3 way   2x10 ea way bilat RTB 2 5# x20 ea  abd/ext B  3#  20x each  + knee flexion         Leg press  125#  10x  145# 10x 135# 10 x 3  135#  30x         TB sidestepping   btb 30' x 3  BTB  30'x4                      hss w/ strap   20s x 5 :20  5x         Heel slide w/ strap   15s x 5  :20  5x                                                Ther Activity             FSU/LSU  FSU 2x10 8" 8" x 20 ea  8"  20x         Lat tap down  2x10 8" 6" x 20  6"  10x  Ankle p!          Gait Training                                       Modalities             CP left knee 10 min At home def Pt def

## 2023-01-16 ENCOUNTER — APPOINTMENT (OUTPATIENT)
Dept: PHYSICAL THERAPY | Facility: CLINIC | Age: 48
End: 2023-01-16

## 2023-02-04 DIAGNOSIS — I10 ESSENTIAL HYPERTENSION: ICD-10-CM

## 2023-02-06 RX ORDER — METOPROLOL TARTRATE 50 MG/1
50 TABLET, FILM COATED ORAL EVERY 12 HOURS SCHEDULED
Qty: 60 TABLET | Refills: 0 | Status: SHIPPED | OUTPATIENT
Start: 2023-02-06

## 2023-03-13 DIAGNOSIS — I10 ESSENTIAL HYPERTENSION: ICD-10-CM

## 2023-03-14 RX ORDER — METOPROLOL TARTRATE 50 MG/1
50 TABLET, FILM COATED ORAL EVERY 12 HOURS SCHEDULED
Qty: 180 TABLET | Refills: 3 | Status: SHIPPED | OUTPATIENT
Start: 2023-03-14

## 2023-05-17 ENCOUNTER — OFFICE VISIT (OUTPATIENT)
Dept: ENDOCRINOLOGY | Facility: CLINIC | Age: 48
End: 2023-05-17

## 2023-05-17 VITALS
BODY MASS INDEX: 35.31 KG/M2 | TEMPERATURE: 97.7 F | WEIGHT: 284 LBS | DIASTOLIC BLOOD PRESSURE: 102 MMHG | OXYGEN SATURATION: 97 % | HEIGHT: 75 IN | SYSTOLIC BLOOD PRESSURE: 162 MMHG | HEART RATE: 60 BPM

## 2023-05-17 DIAGNOSIS — E78.2 MIXED HYPERLIPIDEMIA: ICD-10-CM

## 2023-05-17 DIAGNOSIS — E55.9 VITAMIN D DEFICIENCY: ICD-10-CM

## 2023-05-17 DIAGNOSIS — E29.1 HYPOGONADISM IN MALE: Primary | ICD-10-CM

## 2023-05-17 DIAGNOSIS — E66.9 OBESITY: ICD-10-CM

## 2023-05-17 NOTE — PROGRESS NOTES
New Consult Note      CC: Hypogonadism    History of Present Illness:   46 yr male with with hx of HTN, HLD, obesity, vitamin D deficiency, Hx of NAFLD, Type 2 diabetes and concern for hypogonadism  Roughly since 2018, he made significant changes to diet and lifestyle that led to significant improvement in weight, type 2 diabetes with A1c improvement to normal ranges, NAFLD, HLD and HTN  Over the last 6 months however, he is noticing lack of energy, some reduced morning erections increased fat content and reduced muscle mass and general reduction in secondary sexual characteristics  Maximum adult weight: 330 lbs  Lowest adult weight: 228 lbs  Currently 284 lbs  H/o liver problems in the past-No  H/o recreational drugs ( such as testosterone supplements) or nutritional supplements-No  Use of mariajuana ,in the  Past , No  H/o trauma to the testicles No  H/o prostate problems in the pastNo  H/o prostate cancer or infections No  No h/o prostate cancer in family    Shaves every other day   Jacques Sic does not grow fast    H/o gynecomastia- No  H/o vision problems -No  H/o headaches-No   morning erections Yes  H/o erectile dysfunctionYes  H/o galactorrhea No  Loss of body hair, Chest/abdomen/arm pits -No    Sexually active   Yes    not currently on medications for this problem      Patient Active Problem List   Diagnosis   • Hyperlipidemia   • Obesity   • Prostate cancer screening   • Colon cancer screening   • Alkaline phosphatase elevation   • Vitamin D deficiency   • Essential hypertension   • Hyperglycemia   • Steatosis of liver     Past Medical History:   Diagnosis Date   • Hyperlipidemia    • Hypertension    • Obesity       Past Surgical History:   Procedure Laterality Date   • COLONOSCOPY     • INCISION AND DRAINAGE OF WOUND Right 09/07/2017    Procedure: INCISION AND DRAINAGE (I&D) BUTTOCK;  Surgeon: Melissa Morales MD;  [facility];  Service: General   • KNEE ARTHROSCOPY Right 2020   • HI ARTHRS KNE SURG W/MENISCECTOMY MED/LAT W/SHVG Left 2022    Procedure: ARTHROSCOPY KNEE- Left Knee partial lateral and medial menisectomy;  Surgeon: Yaritza Murillo DO;  [facility];  Service: Orthopedics   • VASECTOMY        Family History   Problem Relation Age of Onset   • Diabetes Mother    • Thyroid disease Mother    • Hypertension Father      Social History     Tobacco Use   • Smoking status: Former     Packs/day: 2 00     Years: 20 00     Pack years: 40 00     Types: Cigarettes     Quit date:      Years since quittin 3   • Smokeless tobacco: Never   Substance Use Topics   • Alcohol use: Yes     Comment: rare on holidays     Allergies   Allergen Reactions   • Loratadine Hives   • Metformin Itching         Current Outpatient Medications:   •  ADVOCATE LANCETS MISC, by Does not apply route daily Patient Tests Once a Day  DX: E11 9, Disp: 100 each, Rfl: 5  •  ascorbic acid (VITAMIN C) 1000 MG tablet, Take 1 tablet by mouth as needed On hold for surgery, Disp: , Rfl:   •  Boron 6 MG TABS, Take by mouth On hold for surgery, Disp: , Rfl:   •  cholecalciferol (VITAMIN D3) 1,000 units tablet, Take 5,000 Units by mouth daily On hold for surgery, Disp: , Rfl:   •  ibuprofen (MOTRIN) 800 mg tablet, Take 1 tablet (800 mg total) by mouth every 8 (eight) hours as needed for mild pain Take 1 tablet 3 times daily, then take only as needed  Take with food and water  Discontinue if stomach upset occurs  , Disp: 30 tablet, Rfl: 0  •  metoprolol tartrate (LOPRESSOR) 50 mg tablet, Take 1 tablet (50 mg total) by mouth every 12 (twelve) hours, Disp: 180 tablet, Rfl: 3  •  multivitamin (THERAGRAN) TABS, Take 1 tablet by mouth daily On hold for surgery, Disp: , Rfl:   •  NON FORMULARY, Take by mouth in the morning Shilajit tablet, on hold for surgery, Disp: , Rfl:   •  Omega-3 Fatty Acids (FISH OIL OMEGA-3 PO), Take by mouth On hold for surgery, Disp: , Rfl:   •  aspirin (ECOTRIN LOW STRENGTH) 81 mg EC tablet, Take 1 "tablet (81 mg total) by mouth 2 (two) times a day (Patient not taking: Reported on 5/17/2023), Disp: 28 tablet, Rfl: 0  •  ondansetron (ZOFRAN) 4 mg tablet, Take 1 tablet (4 mg total) by mouth every 8 (eight) hours as needed for nausea or vomiting (Patient not taking: Reported on 5/17/2023), Disp: 20 tablet, Rfl: 0  •  oxyCODONE-acetaminophen (Percocet) 5-325 mg per tablet, Take 1 tablet by mouth every 4 (four) hours as needed for moderate pain Max Daily Amount: 6 tablets (Patient not taking: Reported on 5/17/2023), Disp: 7 tablet, Rfl: 0    Review of Systems   Constitutional: Positive for activity change and appetite change  HENT: Negative  Eyes: Negative  Respiratory: Negative  Cardiovascular: Negative for chest pain  Gastrointestinal: Negative  Endocrine: Negative  Genitourinary: Negative  Musculoskeletal: Negative  Skin: Negative  Allergic/Immunologic: Negative  Neurological: Negative  Hematological: Negative  Psychiatric/Behavioral: Negative  All other systems reviewed and are negative  Physical Exam:  Body mass index is 35 5 kg/m²  BP (!) 162/102 (BP Location: Left arm, Patient Position: Sitting, Cuff Size: Large)   Pulse 60   Temp 97 7 °F (36 5 °C) (Tympanic)   Ht 6' 3\" (1 905 m)   Wt 129 kg (284 lb)   SpO2 97%   BMI 35 50 kg/m²    Vitals:    05/17/23 1720   Weight: 129 kg (284 lb)        Physical Exam  Constitutional:       General: He is not in acute distress  Appearance: He is well-developed  He is not ill-appearing  HENT:      Head: Normocephalic and atraumatic  Nose: Nose normal       Mouth/Throat:      Pharynx: Oropharynx is clear  Eyes:      Extraocular Movements: Extraocular movements intact  Conjunctiva/sclera: Conjunctivae normal    Neck:      Thyroid: No thyromegaly  Cardiovascular:      Rate and Rhythm: Normal rate     Pulmonary:      Effort: Pulmonary effort is normal    Genitourinary:     Comments: Bilateral testes are " essentially normal on exam   Bilateral 20 cc  There is a left-sided epididymal cyst greater than right side but normal texture and no firm nodule noted  Musculoskeletal:         General: No deformity  Cervical back: Normal range of motion  Skin:     Capillary Refill: Capillary refill takes less than 2 seconds  Coloration: Skin is not pale  Findings: No rash  Comments: No gynecomastia   Neurological:      Mental Status: He is alert and oriented to person, place, and time  Psychiatric:         Behavior: Behavior normal          Labs:   Lab Results   Component Value Date    HGBA1C 6 0 12/21/2022       Lab Results   Component Value Date    [id number] 1 160 03/25/2020       Lab Results   Component Value Date    CREATININE 0 89 12/17/2022    CREATININE 0 85 03/25/2020    CREATININE 1 03 09/19/2019    BUN 19 12/17/2022     09/23/2015    K 4 0 12/17/2022     12/17/2022    CO2 27 12/17/2022     eGFR   Date Value Ref Range Status   12/17/2022 100 ml/min/1 73sq m Final       Lab Results   Component Value Date    ALT 41 12/17/2022    AST 23 12/17/2022    ALKPHOS 100 12/17/2022    BILITOT 0 52 09/23/2015       Lab Results   Component Value Date    CHOLESTEROL 155 03/25/2020    CHOLESTEROL 104 03/06/2019     Lab Results   Component Value Date    HDL 57 03/25/2020    HDL 51 03/06/2019    HDL 50 05/15/2015     Lab Results   Component Value Date    TRIG 134 03/25/2020    TRIG 82 03/06/2019    TRIG 473 05/15/2015     No results found for: NONHDLC      Impression:  1  Hypogonadism in male    2  Vitamin D deficiency    3  Obesity    4  Mixed hyperlipidemia         Plan:    Diagnoses and all orders for this visit:    Hypogonadism in male  He has some symptoms that can suggest hypogonadism  Based on his background history we will rule out hypogonadotropic hypogonadism      Today we discussed all aspects of hypogonadism including normal physiology, pathophysiology, contributing systemic conditions, common symptoms, management principles, complications of treatment, treatment contracts for testosterone replacement and goals of therapy  We will check labs as listed below based on which further decisions can be made  Follow-up in 4 weeks as a virtual visit    -     Estradiol; Future  -     Sex Hormone Binding Globulin  -     Testosterone, free, total; Future  -     Prolactin; Future  -     Follicle stimulating hormone; Future  -     Luteinizing hormone; Future    Vitamin D deficiency  -     Vitamin D 25 hydroxy; Future    Obesity  Today we discussed all aspects of obesity and metabolism including pathophysiology, risk factors, complications, goal of sustaining weight loss long term, usual propensity to regain weight with short term approaches, follow up needs and medications - efficacy and limitations  Discussed role of endocrinopathies, inflammatory conditions, sleep disorders, mental health disorders, lifestyle issues and polypharmacy and weight gain  Briefly discussed bariatric surgery  Diet: carb controlled diet <1500cal/day/ VLCD, 64oz fluids/day   lifestyle modifications: intermittent fasting and 10,000 steps/day  medical fitness training: muscle building  education: nutrition input    -     CBC and differential; Future    Mixed hyperlipidemia  -     Lipid panel; Future        I have spent 45 minutes with patient today in which greater than 50% of this time was spent in counseling/coordination of care  Discussed with the patient and all questioned fully answered  He will call me if any problems arise  Educated/ Counseled patient on diagnostic test results, prognosis, risk vs benefit of treatment options, importance of treatment compliance, healthy life and lifestyle choices        [address]

## 2023-05-17 NOTE — LETTER
May 17, 2023     [place], [place]  [address] [place] [address]    Patient: Josue Gamino   YOB: 1975   Date of Visit: 5/17/2023       Dear Dr Metcalf Rank: Thank you for referring Josue Gamino to me for evaluation  Below are my notes for this consultation  If you have questions, please do not hesitate to call me  I look forward to following your patient along with you  Sincerely,        Angelo Fitzpatrick MD        CC: No Recipients  Angelo Fitzpatrick MD  5/17/2023  6:31 PM  Sign when Signing Visit    New Consult Note      CC: Hypogonadism    History of Present Illness:   46 yr male with with hx of HTN, HLD, obesity, vitamin D deficiency, Hx of NAFLD, Type 2 diabetes and concern for hypogonadism  Roughly since 2018, he made significant changes to diet and lifestyle that led to significant improvement in weight, type 2 diabetes with A1c improvement to normal ranges, NAFLD, HLD and HTN  Over the last 6 months however, he is noticing lack of energy, some reduced morning erections increased fat content and reduced muscle mass and general reduction in secondary sexual characteristics  Maximum adult weight: 330 lbs  Lowest adult weight: 228 lbs  Currently 284 lbs  H/o liver problems in the past-No  H/o recreational drugs ( such as testosterone supplements) or nutritional supplements-No  Use of mariajuana ,in the  Past , No  H/o trauma to the testicles No  H/o prostate problems in the pastNo  H/o prostate cancer or infections No  No h/o prostate cancer in family    Shaves every other day   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    H/o gynecomastia- No  H/o vision problems -No  H/o headaches-No   morning erections Yes  H/o erectile dysfunctionYes  H/o galactorrhea No  Loss of body hair, Chest/abdomen/arm pits -No    Sexually active   Yes    not currently on medications for this problem      Patient Active Problem List   Diagnosis   • Hyperlipidemia   • Obesity   • Prostate cancer screening   • Colon cancer screening   • Alkaline phosphatase elevation   • Vitamin D deficiency   • Essential hypertension   • Hyperglycemia   • Steatosis of liver     Past Medical History:   Diagnosis Date   • Hyperlipidemia    • Hypertension    • Obesity       Past Surgical History:   Procedure Laterality Date   • COLONOSCOPY     • INCISION AND DRAINAGE OF WOUND Right 2017    Procedure: INCISION AND DRAINAGE (I&D) BUTTOCK;  Surgeon: Reshma Finley MD;  [facility];  Service: General   • KNEE ARTHROSCOPY Right    • MT ARTHRS KNE SURG W/MENISCECTOMY MED/LAT W/SHVG Left 2022    Procedure: ARTHROSCOPY KNEE- Left Knee partial lateral and medial menisectomy;  Surgeon: Kemal Mendes DO;  [facility];  Service: Orthopedics   • VASECTOMY        Family History   Problem Relation Age of Onset   • Diabetes Mother    • Thyroid disease Mother    • Hypertension Father      Social History     Tobacco Use   • Smoking status: Former     Packs/day: 2 00     Years: 20 00     Pack years: 40 00     Types: Cigarettes     Quit date:      Years since quittin 3   • Smokeless tobacco: Never   Substance Use Topics   • Alcohol use: Yes     Comment: rare on holidays     Allergies   Allergen Reactions   • Loratadine Hives   • Metformin Itching         Current Outpatient Medications:   •  ADVOCATE LANCETS MISC, by Does not apply route daily Patient Tests Once a Day  DX: E11 9, Disp: 100 each, Rfl: 5  •  ascorbic acid (VITAMIN C) 1000 MG tablet, Take 1 tablet by mouth as needed On hold for surgery, Disp: , Rfl:   •  Boron 6 MG TABS, Take by mouth On hold for surgery, Disp: , Rfl:   •  cholecalciferol (VITAMIN D3) 1,000 units tablet, Take 5,000 Units by mouth daily On hold for surgery, Disp: , Rfl:   •  ibuprofen (MOTRIN) 800 mg tablet, Take 1 tablet (800 mg total) by mouth every 8 (eight) hours as needed for mild pain Take 1 tablet 3 times daily, then take only as needed   Take with "food and water  Discontinue if stomach upset occurs  , Disp: 30 tablet, Rfl: 0  •  metoprolol tartrate (LOPRESSOR) 50 mg tablet, Take 1 tablet (50 mg total) by mouth every 12 (twelve) hours, Disp: 180 tablet, Rfl: 3  •  multivitamin (THERAGRAN) TABS, Take 1 tablet by mouth daily On hold for surgery, Disp: , Rfl:   •  NON FORMULARY, Take by mouth in the morning Shilajit tablet, on hold for surgery, Disp: , Rfl:   •  Omega-3 Fatty Acids (FISH OIL OMEGA-3 PO), Take by mouth On hold for surgery, Disp: , Rfl:   •  aspirin (ECOTRIN LOW STRENGTH) 81 mg EC tablet, Take 1 tablet (81 mg total) by mouth 2 (two) times a day (Patient not taking: Reported on 5/17/2023), Disp: 28 tablet, Rfl: 0  •  ondansetron (ZOFRAN) 4 mg tablet, Take 1 tablet (4 mg total) by mouth every 8 (eight) hours as needed for nausea or vomiting (Patient not taking: Reported on 5/17/2023), Disp: 20 tablet, Rfl: 0  •  oxyCODONE-acetaminophen (Percocet) 5-325 mg per tablet, Take 1 tablet by mouth every 4 (four) hours as needed for moderate pain Max Daily Amount: 6 tablets (Patient not taking: Reported on 5/17/2023), Disp: 7 tablet, Rfl: 0    Review of Systems   Constitutional: Positive for activity change and appetite change  HENT: Negative  Eyes: Negative  Respiratory: Negative  Cardiovascular: Negative for chest pain  Gastrointestinal: Negative  Endocrine: Negative  Genitourinary: Negative  Musculoskeletal: Negative  Skin: Negative  Allergic/Immunologic: Negative  Neurological: Negative  Hematological: Negative  Psychiatric/Behavioral: Negative  All other systems reviewed and are negative  Physical Exam:  Body mass index is 35 5 kg/m²    BP (!) 162/102 (BP Location: Left arm, Patient Position: Sitting, Cuff Size: Large)   Pulse 60   Temp 97 7 °F (36 5 °C) (Tympanic)   Ht 6' 3\" (1 905 m)   Wt 129 kg (284 lb)   SpO2 97%   BMI 35 50 kg/m²    Vitals:    05/17/23 1720   Weight: 129 kg (284 lb)        Physical " Exam  Constitutional:       General: He is not in acute distress  Appearance: He is well-developed  He is not ill-appearing  HENT:      Head: Normocephalic and atraumatic  Nose: Nose normal       Mouth/Throat:      Pharynx: Oropharynx is clear  Eyes:      Extraocular Movements: Extraocular movements intact  Conjunctiva/sclera: Conjunctivae normal    Neck:      Thyroid: No thyromegaly  Cardiovascular:      Rate and Rhythm: Normal rate  Pulmonary:      Effort: Pulmonary effort is normal    Genitourinary:     Comments: Bilateral testes are essentially normal on exam   Bilateral 20 cc  There is a left-sided epididymal cyst greater than right side but normal texture and no firm nodule noted  Musculoskeletal:         General: No deformity  Cervical back: Normal range of motion  Skin:     Capillary Refill: Capillary refill takes less than 2 seconds  Coloration: Skin is not pale  Findings: No rash  Comments: No gynecomastia   Neurological:      Mental Status: He is alert and oriented to person, place, and time     Psychiatric:         Behavior: Behavior normal          Labs:   Lab Results   Component Value Date    HGBA1C 6 0 12/21/2022       Lab Results   Component Value Date    [id number] 1 160 03/25/2020       Lab Results   Component Value Date    CREATININE 0 89 12/17/2022    CREATININE 0 85 03/25/2020    CREATININE 1 03 09/19/2019    BUN 19 12/17/2022     09/23/2015    K 4 0 12/17/2022     12/17/2022    CO2 27 12/17/2022     eGFR   Date Value Ref Range Status   12/17/2022 100 ml/min/1 73sq m Final       Lab Results   Component Value Date    ALT 41 12/17/2022    AST 23 12/17/2022    ALKPHOS 100 12/17/2022    BILITOT 0 52 09/23/2015       Lab Results   Component Value Date    CHOLESTEROL 155 03/25/2020    CHOLESTEROL 104 03/06/2019     Lab Results   Component Value Date    HDL 57 03/25/2020    HDL 51 03/06/2019    HDL 50 05/15/2015     Lab Results   Component Value Date    TRIG 134 03/25/2020    TRIG 82 03/06/2019    TRIG 473 05/15/2015     No results found for: NONHDLC      Impression:  1  Hypogonadism in male    2  Vitamin D deficiency    3  Obesity    4  Mixed hyperlipidemia         Plan:    Diagnoses and all orders for this visit:    Hypogonadism in male  He has some symptoms that can suggest hypogonadism  Based on his background history we will rule out hypogonadotropic hypogonadism  Today we discussed all aspects of hypogonadism including normal physiology, pathophysiology, contributing systemic conditions, common symptoms, management principles, complications of treatment, treatment contracts for testosterone replacement and goals of therapy  We will check labs as listed below based on which further decisions can be made  Follow-up in 4 weeks as a virtual visit    -     Estradiol; Future  -     Sex Hormone Binding Globulin  -     Testosterone, free, total; Future  -     Prolactin; Future  -     Follicle stimulating hormone; Future  -     Luteinizing hormone; Future    Vitamin D deficiency  -     Vitamin D 25 hydroxy; Future    Obesity  Today we discussed all aspects of obesity and metabolism including pathophysiology, risk factors, complications, goal of sustaining weight loss long term, usual propensity to regain weight with short term approaches, follow up needs and medications - efficacy and limitations  Discussed role of endocrinopathies, inflammatory conditions, sleep disorders, mental health disorders, lifestyle issues and polypharmacy and weight gain  Briefly discussed bariatric surgery  Diet: carb controlled diet <1500cal/day/ VLCD, 64oz fluids/day   lifestyle modifications: intermittent fasting and 10,000 steps/day  medical fitness training: muscle building  education: nutrition input    -     CBC and differential; Future    Mixed hyperlipidemia  -     Lipid panel;  Future        I have spent 45 minutes with patient today in which greater than 50% of this time was spent in counseling/coordination of care  Discussed with the patient and all questioned fully answered  He will call me if any problems arise  Educated/ Counseled patient on diagnostic test results, prognosis, risk vs benefit of treatment options, importance of treatment compliance, healthy life and lifestyle choices        [address]

## 2023-06-24 ENCOUNTER — APPOINTMENT (OUTPATIENT)
Age: 48
End: 2023-06-24
Payer: COMMERCIAL

## 2023-06-24 DIAGNOSIS — E29.1 HYPOGONADISM IN MALE: ICD-10-CM

## 2023-06-24 DIAGNOSIS — E78.2 MIXED HYPERLIPIDEMIA: ICD-10-CM

## 2023-06-24 DIAGNOSIS — E66.9 OBESITY: ICD-10-CM

## 2023-06-24 DIAGNOSIS — E55.9 VITAMIN D DEFICIENCY: ICD-10-CM

## 2023-06-24 LAB
25(OH)D3 SERPL-MCNC: 29.4 NG/ML (ref 30–100)
BASOPHILS # BLD AUTO: 0.03 THOUSANDS/ÂΜL (ref 0–0.1)
BASOPHILS NFR BLD AUTO: 1 % (ref 0–1)
CHOLEST SERPL-MCNC: 193 MG/DL
EOSINOPHIL # BLD AUTO: 0.12 THOUSAND/ÂΜL (ref 0–0.61)
EOSINOPHIL NFR BLD AUTO: 3 % (ref 0–6)
ERYTHROCYTE [DISTWIDTH] IN BLOOD BY AUTOMATED COUNT: 13.4 % (ref 11.6–15.1)
ESTRADIOL SERPL-MCNC: 34.8 PG/ML
FSH SERPL-ACNC: 4.3 MIU/ML
HCT VFR BLD AUTO: 42.2 % (ref 36.5–49.3)
HDLC SERPL-MCNC: 62 MG/DL
HGB BLD-MCNC: 14.3 G/DL (ref 12–17)
IMM GRANULOCYTES # BLD AUTO: 0.03 THOUSAND/UL (ref 0–0.2)
IMM GRANULOCYTES NFR BLD AUTO: 1 % (ref 0–2)
LDLC SERPL CALC-MCNC: 116 MG/DL (ref 0–100)
LH SERPL-ACNC: 4.6 MIU/ML
LYMPHOCYTES # BLD AUTO: 0.95 THOUSANDS/ÂΜL (ref 0.6–4.47)
LYMPHOCYTES NFR BLD AUTO: 27 % (ref 14–44)
MCH RBC QN AUTO: 29.8 PG (ref 26.8–34.3)
MCHC RBC AUTO-ENTMCNC: 33.9 G/DL (ref 31.4–37.4)
MCV RBC AUTO: 88 FL (ref 82–98)
MONOCYTES # BLD AUTO: 0.33 THOUSAND/ÂΜL (ref 0.17–1.22)
MONOCYTES NFR BLD AUTO: 10 % (ref 4–12)
NEUTROPHILS # BLD AUTO: 2.03 THOUSANDS/ÂΜL (ref 1.85–7.62)
NEUTS SEG NFR BLD AUTO: 58 % (ref 43–75)
NONHDLC SERPL-MCNC: 131 MG/DL
NRBC BLD AUTO-RTO: 0 /100 WBCS
PLATELET # BLD AUTO: 227 THOUSANDS/UL (ref 149–390)
PMV BLD AUTO: 11.6 FL (ref 8.9–12.7)
PROLACTIN SERPL-MCNC: 6.29 NG/ML
RBC # BLD AUTO: 4.8 MILLION/UL (ref 3.88–5.62)
TRIGL SERPL-MCNC: 76 MG/DL
WBC # BLD AUTO: 3.49 THOUSAND/UL (ref 4.31–10.16)

## 2023-06-24 PROCEDURE — 82670 ASSAY OF TOTAL ESTRADIOL: CPT

## 2023-06-24 PROCEDURE — 83002 ASSAY OF GONADOTROPIN (LH): CPT

## 2023-06-24 PROCEDURE — 83001 ASSAY OF GONADOTROPIN (FSH): CPT

## 2023-06-24 PROCEDURE — 84403 ASSAY OF TOTAL TESTOSTERONE: CPT

## 2023-06-24 PROCEDURE — 82306 VITAMIN D 25 HYDROXY: CPT

## 2023-06-24 PROCEDURE — 84146 ASSAY OF PROLACTIN: CPT

## 2023-06-24 PROCEDURE — 85025 COMPLETE CBC W/AUTO DIFF WBC: CPT

## 2023-06-24 PROCEDURE — 80061 LIPID PANEL: CPT

## 2023-06-24 PROCEDURE — 84402 ASSAY OF FREE TESTOSTERONE: CPT

## 2023-06-26 LAB
SHBG SERPL-SCNC: 41.7 NMOL/L (ref 19.3–76.4)
TESTOST FREE SERPL-MCNC: 11.5 PG/ML (ref 7.2–24)
TESTOST SERPL-MCNC: 397 NG/DL (ref 264–916)

## 2023-06-30 ENCOUNTER — TELEPHONE (OUTPATIENT)
Dept: OBGYN CLINIC | Facility: CLINIC | Age: 48
End: 2023-06-30

## 2023-06-30 NOTE — TELEPHONE ENCOUNTER
Caller: [address] (Wife)     Doctor: Dr Cobos    Reason for call: Wanting to see if referral can be placed for patient to have injections into bilat shoulders done with pain management or if he needs to be seen prior to with Dr Cobos     Call back#: [phone]

## 2023-06-30 NOTE — TELEPHONE ENCOUNTER
Caller: Wife     Doctor: Billy Ramirez     Reason for call: Asked for an update on last message?    Call back#: [phone] shahzad

## 2023-07-03 ENCOUNTER — TELEMEDICINE (OUTPATIENT)
Dept: ENDOCRINOLOGY | Facility: CLINIC | Age: 48
End: 2023-07-03
Payer: COMMERCIAL

## 2023-07-03 DIAGNOSIS — E66.9 CLASS 2 OBESITY WITHOUT SERIOUS COMORBIDITY WITH BODY MASS INDEX (BMI) OF 35.0 TO 35.9 IN ADULT, UNSPECIFIED OBESITY TYPE: ICD-10-CM

## 2023-07-03 DIAGNOSIS — K76.0 STEATOSIS OF LIVER: ICD-10-CM

## 2023-07-03 DIAGNOSIS — E55.9 VITAMIN D DEFICIENCY: ICD-10-CM

## 2023-07-03 DIAGNOSIS — E29.1 HYPOGONADISM IN MALE: Primary | ICD-10-CM

## 2023-07-03 DIAGNOSIS — E78.2 MIXED HYPERLIPIDEMIA: ICD-10-CM

## 2023-07-03 PROCEDURE — 99214 OFFICE O/P EST MOD 30 MIN: CPT | Performed by: INTERNAL MEDICINE

## 2023-07-03 NOTE — PROGRESS NOTES
REQUIRED DOCUMENTATION:      1. This service was provided via Telemedicine. 2. Provider located at [place], [place]. 3. TeleMed provider: MD Carey.  4. Identify all parties in room with patient during tele consult:  The patient, advanced practitioner and bedside nurse  5. Patient was then informed that this was a Telemedicine visit and that the exam was being conducted confidentially over secure lines. My office door was closed. No one else was in the room. Patient acknowledged consent and understanding of privacy and security of the Telemedicine visit, and gave us permission to have the assistant stay in the room in order to assist with the history and to conduct the exam.  I informed the patient that I have reviewed their record in Epic and presented the opportunity for them to ask any questions regarding the visit today. The patient agreed to participate. Patient is aware this is a billable service. Follow-up Patient Progress Note      CC: Concern for Hypogonadism     History of Present Illness:   46 yr male with with hx of HTN, HLD, obesity, vitamin D deficiency, Hx of NAFLD, Type 2 diabetes and concern for hypogonadism. Last visit was 5/17/2023. Since last visit, he     Roughly since 2018, he made significant changes to diet and lifestyle that led to significant improvement in weight, type 2 diabetes with A1c improvement to normal ranges, NAFLD, HLD and HTN. Over the last 6 months however, he is noticing lack of energy, some reduced morning erections increased fat content and reduced muscle mass and general reduction in secondary sexual characteristics.     Maximum adult weight: 330 lbs. Lowest adult weight: 228 lbs. Currently 284 lbs.     Patient Active Problem List   Diagnosis   • Hyperlipidemia   • Obesity   • Prostate cancer screening   • Colon cancer screening   • Alkaline phosphatase elevation   • Vitamin D deficiency   • Essential hypertension   • Hyperglycemia   • Steatosis of liver     Past Medical History:   Diagnosis Date   • Hyperlipidemia    • Hypertension    • Obesity       Past Surgical History:   Procedure Laterality Date   • COLONOSCOPY     • INCISION AND DRAINAGE OF WOUND Right 2017    Procedure: INCISION AND DRAINAGE (I&D) BUTTOCK;  Surgeon: Siddhartha Cotton MD;  Location: [facility];  Service: General   • KNEE ARTHROSCOPY Right    • [place] ARTHRS KNE SURG W/MENISCECTOMY MED/LAT W/SHVG Left 2022    Procedure: ARTHROSCOPY KNEE- Left Knee partial lateral and medial menisectomy;  Surgeon: Analy Victor DO;  Location: [facility];  Service: Orthopedics   • VASECTOMY        Family History   Problem Relation Age of Onset   • Diabetes Mother    • Thyroid disease Mother    • Hypertension Father      Social History     Tobacco Use   • Smoking status: Former     Packs/day: 2.00     Years: 20.00     Total pack years: 40.00     Types: Cigarettes     Quit date:      Years since quittin.5   • Smokeless tobacco: Never   Substance Use Topics   • Alcohol use: Yes     Comment: rare on holidays     Allergies   Allergen Reactions   • Loratadine Hives   • Metformin Itching         Current Outpatient Medications:   •  ADVOCATE LANCETS MISC, by Does not apply route daily Patient Tests Once a Day.  DX: E11.9, Disp: 100 each, Rfl: 5  •  ascorbic acid (VITAMIN C) 1000 MG tablet, Take 1 tablet by mouth as needed On hold for surgery, Disp: , Rfl:   •  aspirin (ECOTRIN LOW STRENGTH) 81 mg EC tablet, Take 1 tablet (81 mg total) by mouth 2 (two) times a day (Patient not taking: Reported on 2023), Disp: 28 tablet, Rfl: 0  •  Boron 6 MG TABS, Take by mouth On hold for surgery, Disp: , Rfl:   •  cholecalciferol (VITAMIN D3) 1,000 units tablet, Take 5,000 Units by mouth daily On hold for surgery, Disp: , Rfl:   •  ibuprofen (MOTRIN) 800 mg tablet, Take 1 tablet (800 mg total) by mouth every 8 (eight) hours as needed for mild pain Take 1 tablet 3 times daily, then take only as needed. Take with food and water. Discontinue if stomach upset occurs. , Disp: 30 tablet, Rfl: 0  •  metoprolol tartrate (LOPRESSOR) 50 mg tablet, Take 1 tablet (50 mg total) by mouth every 12 (twelve) hours, Disp: 180 tablet, Rfl: 3  •  multivitamin (THERAGRAN) TABS, Take 1 tablet by mouth daily On hold for surgery, Disp: , Rfl:   •  NON FORMULARY, Take by mouth in the morning Shilajit tablet, on hold for surgery, Disp: , Rfl:   •  Omega-3 Fatty Acids (FISH OIL OMEGA-3 PO), Take by mouth On hold for surgery, Disp: , Rfl:   •  ondansetron (ZOFRAN) 4 mg tablet, Take 1 tablet (4 mg total) by mouth every 8 (eight) hours as needed for nausea or vomiting (Patient not taking: Reported on 5/17/2023), Disp: 20 tablet, Rfl: 0  •  oxyCODONE-acetaminophen (Percocet) 5-325 mg per tablet, Take 1 tablet by mouth every 4 (four) hours as needed for moderate pain Max Daily Amount: 6 tablets (Patient not taking: Reported on 5/17/2023), Disp: 7 tablet, Rfl: 0    Review of Systems   Constitutional: Positive for activity change and appetite change. HENT: Negative. Eyes: Negative. Respiratory: Negative. Cardiovascular: Negative for chest pain. Gastrointestinal: Negative. Endocrine: Negative. Genitourinary: Negative. Musculoskeletal: Negative. Skin: Negative. Allergic/Immunologic: Negative. Neurological: Negative. Hematological: Negative. Psychiatric/Behavioral: Negative. All other systems reviewed and are negative. Physical Exam:  There is no height or weight on file to calculate BMI. There were no vitals taken for this visit. There were no vitals filed for this visit. Physical Exam  Constitutional:       General: He is not in acute distress. Appearance: He is well-developed. He is not ill-appearing. HENT:      Head: Normocephalic and atraumatic. Nose: Nose normal.      Mouth/Throat:      Pharynx: Oropharynx is clear.    Eyes:      Extraocular Movements: Extraocular movements intact. Conjunctiva/sclera: Conjunctivae normal.   Neck:      Thyroid: No thyromegaly. Cardiovascular:      Rate and Rhythm: Normal rate. Pulmonary:      Effort: Pulmonary effort is normal.   Musculoskeletal:         General: No deformity. Cervical back: Normal range of motion. Skin:     Capillary Refill: Capillary refill takes less than 2 seconds. Coloration: Skin is not pale. Findings: No rash. Neurological:      Mental Status: He is alert and oriented to person, place, and time. Psychiatric:         Behavior: Behavior normal.         Labs:   Lab Results   Component Value Date    HGBA1C 6.0 12/21/2022       Lab Results   Component Value Date    XTM2AVQCTBTZ 1.160 03/25/2020       Lab Results   Component Value Date    CREATININE 0.89 12/17/2022    CREATININE 0.85 03/25/2020    CREATININE 1.03 09/19/2019    BUN 19 12/17/2022     09/23/2015    K 4.0 12/17/2022     12/17/2022    CO2 27 12/17/2022     eGFR   Date Value Ref Range Status   12/17/2022 100 ml/min/1.73sq m Final       Lab Results   Component Value Date    ALT 41 12/17/2022    AST 23 12/17/2022    ALKPHOS 100 12/17/2022    BILITOT 0.52 09/23/2015       Lab Results   Component Value Date    CHOLESTEROL 193 06/24/2023    CHOLESTEROL 155 03/25/2020    CHOLESTEROL 104 03/06/2019     Lab Results   Component Value Date    HDL 62 06/24/2023    HDL 57 03/25/2020    HDL 51 03/06/2019     Lab Results   Component Value Date    TRIG 76 06/24/2023    TRIG 134 03/25/2020    TRIG 82 03/06/2019     Lab Results   Component Value Date    [address] 06/24/2023         Impression:  1. Hypogonadism in male    2. Vitamin D deficiency    3. Steatosis of liver    4. Class 2 obesity without serious comorbidity with body mass index (BMI) of 35.0 to 35.9 in adult, unspecified obesity type    5. Mixed hyperlipidemia         Plan:    Diagnoses and all orders for this visit:    Hypogonadism in male.   He had some symptoms that were concerning for hypogonadism but recent labs showed normal total and free testosterone, slightly elevated estradiol, normal gonadotropins and high normal SHBG. He has lost approximately 10-15 lbs and feels better. Today we discussed options and I suggested repeat labs in roughly 11 months and follow-up in 12 months for monitoring.    -     Testosterone, free, total; Future  -     Sex Hormone Binding Globulin  -     Estradiol; Future    Vitamin D deficiency. Advised vitamin D3 5000 IU daily OTC.  -     Vitamin D 25 hydroxy; Future    Steatosis of liver. This may improve with diet and lifestyle modifications that will help lose weight. In future if lifestyle changes are not enough, we may consider GLP-1 agonist.  Other treatment options would be vitamin E 800 IU daily. Class 2 obesity without serious comorbidity with body mass index (BMI) of 35.0 to 35.9 in adult, unspecified obesity type. Today we discussed all aspects of obesity and metabolism including pathophysiology, risk factors, complications, goal of sustaining weight loss long term, usual propensity to regain weight with short term approaches, follow up needs and medications - efficacy and limitations. Discussed role of endocrinopathies, inflammatory conditions, sleep disorders, mental health disorders, lifestyle issues and polypharmacy and weight gain. Briefly discussed bariatric surgery. Diet: carb controlled diet <1500cal/day/ VLCD, 64oz fluids/day   lifestyle modifications: intermittent fasting and 10,000 steps/day  medical fitness training: muscle building  education: nutrition input    Advised muscle building activity. Mixed hyperlipidemia. He has an ASCVD risk score of 8.8%. He is recommended to use statins as per guidelines however we may wait till he make changes to diet and lifestyle and see if there is improvement in his lipid profile in 6 months.         I have spent 32 minutes with patient today in which greater than 50% of this time was spent in counseling/coordination of care. Discussed with the patient and all questioned fully answered. He will call me if any problems arise. Educated/ Counseled patient on diagnostic test results, prognosis, risk vs benefit of treatment options, importance of treatment compliance, healthy life and lifestyle choices.       Carey

## 2023-07-05 ENCOUNTER — TELEPHONE (OUTPATIENT)
Dept: OBGYN CLINIC | Facility: HOSPITAL | Age: 48
End: 2023-07-05

## 2023-07-05 NOTE — TELEPHONE ENCOUNTER
Caller: Sherie/Wife    Doctor: Jennifer Hassan    Reason for call: Questioned if appt could be done virtually?     Call back#: [phone]

## 2023-07-13 ENCOUNTER — TELEMEDICINE (OUTPATIENT)
Dept: OBGYN CLINIC | Facility: CLINIC | Age: 48
End: 2023-07-13
Payer: COMMERCIAL

## 2023-07-13 DIAGNOSIS — M19.011 OSTEOARTHRITIS OF BILATERAL GLENOHUMERAL JOINTS: Primary | ICD-10-CM

## 2023-07-13 DIAGNOSIS — M19.012 OSTEOARTHRITIS OF BILATERAL GLENOHUMERAL JOINTS: Primary | ICD-10-CM

## 2023-07-13 PROCEDURE — 99441 PR PHYS/QHP TELEPHONE EVALUATION 5-10 MIN: CPT | Performed by: ORTHOPAEDIC SURGERY

## 2023-07-13 NOTE — PROGRESS NOTES
Patient Name:  Anna Carroll  MRN:  [id number]    Assessment & Plan     1. Osteoarthritis of bilateral glenohumeral joints  -     [place] spine and pain procedure; Future; Expected date: 07/13/2023        Bilateral glenohumeral osteoarthritis. · Attempted virtual visit in office today was unsuccessful due to patient not having access to video while at work. We conducted a phone visit instead. · Patient would like to proceed forward with repeat image guided corticosteroid injections. A new referral was placed today. Patient was instructed that if he would like to proceed forward with injections in the future for his shoulders he can try to contact the spine and pain management directly. · I will see the patient back on an as-needed basis. History of the Present Illness   Anna Carroll is a 46 y.o. male with Bilateral glenohumeral osteoarthritis. The patient has an increase in pain to bilateral shoulders. He previously had intraarticular injection to the glenohumeral joint space on 11/17/22 with Dr. Lucero Vila. Today he is requesting repeat intraarticular corticosteroid injections. He reports worsening pain in his shoulders over the past month. Pain is worse with activity including performing his job which requires climbing and overhead lifting. He reports no change in range of motion. No other new complaints. Review of Systems     Review of Systems   Constitutional: Negative for appetite change and unexpected weight change. HENT: Negative for congestion and trouble swallowing. Eyes: Negative for visual disturbance. Respiratory: Negative for cough and shortness of breath. Cardiovascular: Negative for chest pain and palpitations. Gastrointestinal: Negative for nausea and vomiting. Endocrine: Negative for cold intolerance and heat intolerance. Musculoskeletal: Positive for arthralgias. Negative for joint swelling and myalgias. Skin: Negative for rash. Neurological: Negative for numbness. Physical exam unable to perform secondary to inability to have video for virtual visit leaving with visit simply being performed over phone. Data Review     I have personally reviewed pertinent films in PACS, and my interpretation follows. X-rays taken 2022 of Bilateral shoulders demonstrate severe glenohumeral osteoarthritis with large inferior humeral head osteophytes. Joint space narrowing and sclerosis noted. No acute fracture or dislocation noted. Social History     Tobacco Use   • Smoking status: Former     Packs/day: 2.00     Years: 20.00     Total pack years: 40.00     Types: Cigarettes     Quit date:      Years since quittin.5   • Smokeless tobacco: Never   Vaping Use   • Vaping Use: Never used   Substance Use Topics   • Alcohol use: Yes     Comment: rare on holidays   • Drug use: No           Procedures  None performed.      Dolly Bowers, DO

## 2023-08-01 ENCOUNTER — TELEPHONE (OUTPATIENT)
Age: 48
End: 2023-08-01

## 2023-08-01 NOTE — TELEPHONE ENCOUNTER
Caller: Corie Mcburney PT    Doctor: Dr Jonn Ritchie    Reason for call:  Wife called in to schedule a injection from a referral from Rodrigo Mcghee DO    Call back#: [phone]

## 2023-08-01 NOTE — TELEPHONE ENCOUNTER
Caller: Sherie-wife    Doctor: Torres Coelho    Reason for call: wife called asking if the referral was entered for the pt to have his shoulders injected by [facility]    Call back#:

## 2023-08-01 NOTE — TELEPHONE ENCOUNTER
Patient is scheduled for procedure on 8.15.23 with Dr. Fadumo Richard. Pre Procedure Instructions were reviewed.

## 2023-08-15 ENCOUNTER — HOSPITAL ENCOUNTER (OUTPATIENT)
Dept: RADIOLOGY | Facility: CLINIC | Age: 48
Discharge: HOME | End: 2023-08-15
Admitting: STUDENT IN AN ORGANIZED HEALTH CARE EDUCATION/TRAINING PROGRAM
Payer: COMMERCIAL

## 2023-08-15 VITALS
TEMPERATURE: 98.2 F | SYSTOLIC BLOOD PRESSURE: 171 MMHG | DIASTOLIC BLOOD PRESSURE: 98 MMHG | RESPIRATION RATE: 20 BRPM | OXYGEN SATURATION: 95 % | HEART RATE: 57 BPM

## 2023-08-15 DIAGNOSIS — M19.011 OSTEOARTHRITIS OF BILATERAL GLENOHUMERAL JOINTS: ICD-10-CM

## 2023-08-15 DIAGNOSIS — M19.012 OSTEOARTHRITIS OF BILATERAL GLENOHUMERAL JOINTS: ICD-10-CM

## 2023-08-15 PROCEDURE — 20610 DRAIN/INJ JOINT/BURSA W/O US: CPT | Performed by: STUDENT IN AN ORGANIZED HEALTH CARE EDUCATION/TRAINING PROGRAM

## 2023-08-15 PROCEDURE — 77002 NEEDLE LOCALIZATION BY XRAY: CPT

## 2023-08-15 PROCEDURE — 77002 NEEDLE LOCALIZATION BY XRAY: CPT | Performed by: STUDENT IN AN ORGANIZED HEALTH CARE EDUCATION/TRAINING PROGRAM

## 2023-08-15 RX ORDER — BUPIVACAINE HCL/PF 2.5 MG/ML
9 VIAL (ML) INJECTION ONCE
Status: COMPLETED | OUTPATIENT
Start: 2023-08-15 | End: 2023-08-15

## 2023-08-15 RX ORDER — METHYLPREDNISOLONE ACETATE 80 MG/ML
80 INJECTION, SUSPENSION INTRA-ARTICULAR; INTRALESIONAL; INTRAMUSCULAR; PARENTERAL; SOFT TISSUE ONCE
Status: COMPLETED | OUTPATIENT
Start: 2023-08-15 | End: 2023-08-15

## 2023-08-15 RX ADMIN — METHYLPREDNISOLONE ACETATE 80 MG: 80 INJECTION, SUSPENSION INTRA-ARTICULAR; INTRALESIONAL; INTRAMUSCULAR; PARENTERAL; SOFT TISSUE at 15:42

## 2023-08-15 RX ADMIN — Medication 9 ML: at 15:42

## 2023-08-15 RX ADMIN — IOHEXOL 2 ML: 300 INJECTION, SOLUTION INTRAVENOUS at 15:42

## 2023-08-15 NOTE — DISCHARGE INSTR - LAB

## 2023-08-15 NOTE — H&P
History of Present Illness: The patient is a 46 y.o. male who presents with complaints of b/l shoulder pain    Past Medical History:   Diagnosis Date   • Hyperlipidemia    • Hypertension    • Obesity        Past Surgical History:   Procedure Laterality Date   • COLONOSCOPY     • INCISION AND DRAINAGE OF WOUND Right 09/07/2017    Procedure: INCISION AND DRAINAGE (I&D) BUTTOCK;  Surgeon: Gonsalo Alcocer MD;  Location: [facility];  Service: General   • KNEE ARTHROSCOPY Right 2020   • [place] ARTHRS KNE SURG W/MENISCECTOMY MED/LAT W/SHVG Left 12/28/2022    Procedure: ARTHROSCOPY KNEE- Left Knee partial lateral and medial menisectomy;  Surgeon: Frandy Mckeon DO;  Location: [facility];  Service: Orthopedics   • VASECTOMY           Current Outpatient Medications:   •  ADVOCATE LANCETS MISC, by Does not apply route daily Patient Tests Once a Day. DX: E11.9, Disp: 100 each, Rfl: 5  •  ascorbic acid (VITAMIN C) 1000 MG tablet, Take 1 tablet by mouth as needed On hold for surgery, Disp: , Rfl:   •  aspirin (ECOTRIN LOW STRENGTH) 81 mg EC tablet, Take 1 tablet (81 mg total) by mouth 2 (two) times a day (Patient not taking: Reported on 5/17/2023), Disp: 28 tablet, Rfl: 0  •  Boron 6 MG TABS, Take by mouth On hold for surgery, Disp: , Rfl:   •  cholecalciferol (VITAMIN D3) 1,000 units tablet, Take 5,000 Units by mouth daily On hold for surgery, Disp: , Rfl:   •  ibuprofen (MOTRIN) 800 mg tablet, Take 1 tablet (800 mg total) by mouth every 8 (eight) hours as needed for mild pain Take 1 tablet 3 times daily, then take only as needed. Take with food and water. Discontinue if stomach upset occurs. , Disp: 30 tablet, Rfl: 0  •  metoprolol tartrate (LOPRESSOR) 50 mg tablet, Take 1 tablet (50 mg total) by mouth every 12 (twelve) hours, Disp: 180 tablet, Rfl: 3  •  multivitamin (THERAGRAN) TABS, Take 1 tablet by mouth daily On hold for surgery, Disp: , Rfl:   •  NON FORMULARY, Take by mouth in the morning Shilajit tablet, on hold for surgery, Disp: , Rfl:   •  Omega-3 Fatty Acids (FISH OIL OMEGA-3 PO), Take by mouth On hold for surgery, Disp: , Rfl:   •  ondansetron (ZOFRAN) 4 mg tablet, Take 1 tablet (4 mg total) by mouth every 8 (eight) hours as needed for nausea or vomiting (Patient not taking: Reported on 5/17/2023), Disp: 20 tablet, Rfl: 0  •  oxyCODONE-acetaminophen (Percocet) 5-325 mg per tablet, Take 1 tablet by mouth every 4 (four) hours as needed for moderate pain Max Daily Amount: 6 tablets (Patient not taking: Reported on 5/17/2023), Disp: 7 tablet, Rfl: 0    Current Facility-Administered Medications:   •  bupivacaine (PF) (MARCAINE) 0.25 % injection 9 mL, 9 mL, Intra-articular, Once, Vicky Ray MD  •  iohexol (OMNIPAQUE) 300 mg/mL injection 2 mL, 2 mL, Intra-articular, Once, Vicky Ray MD  •  methylPREDNISolone acetate (DEPO-MEDROL) injection 80 mg, 80 mg, Intra-articular, Once, Vicky Ray MD    Allergies   Allergen Reactions   • Loratadine Hives   • Metformin Itching       Physical Exam:   Vitals:    08/15/23 1527   BP: (!) 182/103   Pulse: 57   Resp: 20   Temp: 98.2 °F (36.8 °C)   SpO2: 95%     General: Awake, Alert, Oriented x 3, Mood and affect appropriate  Respiratory: Respirations even and unlabored  Cardiovascular: Peripheral pulses intact; no edema  Musculoskeletal Exam: pain with abduction bilateral shoulders    ASA Score: 3    Patient/Chart Verification  Patient ID Verified: Verbal  ID Band Applied: No  Consents Confirmed: To be obtained in the Pre-Procedure area  H&P( within 30 days) Verified: To be obtained in the Pre-Procedure area  Interval H&P(within 24 hr) Complete (required for Outpatients and Surgery Admit only): To be obtained in the Pre-Procedure area  Allergies Reviewed: Yes  Anticoag/NSAID held?: NA  Currently on antibiotics?: No  Pregnancy denied?: NA    Assessment:   1.  Osteoarthritis of bilateral glenohumeral joints        Plan: Bilateral shoulder osteoarthritis

## 2024-02-21 PROBLEM — Z12.11 COLON CANCER SCREENING: Status: RESOLVED | Noted: 2019-03-06 | Resolved: 2024-02-21

## 2024-02-21 PROBLEM — Z12.5 PROSTATE CANCER SCREENING: Status: RESOLVED | Noted: 2019-03-06 | Resolved: 2024-02-21

## 2024-06-07 ENCOUNTER — OFFICE VISIT (OUTPATIENT)
Age: 49
End: 2024-06-07
Payer: COMMERCIAL

## 2024-06-07 VITALS
HEART RATE: 79 BPM | RESPIRATION RATE: 18 BRPM | OXYGEN SATURATION: 96 % | WEIGHT: 302 LBS | TEMPERATURE: 97.4 F | SYSTOLIC BLOOD PRESSURE: 146 MMHG | BODY MASS INDEX: 36.77 KG/M2 | HEIGHT: 76 IN | DIASTOLIC BLOOD PRESSURE: 80 MMHG

## 2024-06-07 DIAGNOSIS — E78.2 MIXED HYPERLIPIDEMIA: ICD-10-CM

## 2024-06-07 DIAGNOSIS — I10 ESSENTIAL HYPERTENSION: Primary | ICD-10-CM

## 2024-06-07 DIAGNOSIS — R73.9 HYPERGLYCEMIA: ICD-10-CM

## 2024-06-07 DIAGNOSIS — Z12.5 SCREENING FOR PROSTATE CANCER: ICD-10-CM

## 2024-06-07 PROBLEM — R74.8 ALKALINE PHOSPHATASE ELEVATION: Status: RESOLVED | Noted: 2019-09-19 | Resolved: 2024-06-07

## 2024-06-07 PROCEDURE — 99214 OFFICE O/P EST MOD 30 MIN: CPT | Performed by: INTERNAL MEDICINE

## 2024-06-07 RX ORDER — METOPROLOL TARTRATE 50 MG/1
50 TABLET, FILM COATED ORAL EVERY 12 HOURS SCHEDULED
Qty: 180 TABLET | Refills: 3 | Status: SHIPPED | OUTPATIENT
Start: 2024-06-07

## 2024-06-07 NOTE — ASSESSMENT & PLAN NOTE
BP elevated slightly above goal which is likely a factor of him gaining weight. Discussed need to get his weight down which will improve his blood pressure and other metabolic abnormalities.    Orders:    metoprolol tartrate (LOPRESSOR) 50 mg tablet; Take 1 tablet (50 mg total) by mouth every 12 (twelve) hours

## 2024-06-07 NOTE — ASSESSMENT & PLAN NOTE
Check A1c for further evaluation of his glucose control. Has gained weight which puts him at risk for pre-diabetes/diabetes.    Orders:    Hemoglobin A1C; Future

## 2024-06-07 NOTE — PROGRESS NOTES
Assessment & Plan  Essential hypertension    BP elevated slightly above goal which is likely a factor of him gaining weight. Discussed need to get his weight down which will improve his blood pressure and other metabolic abnormalities.    Orders:    metoprolol tartrate (LOPRESSOR) 50 mg tablet; Take 1 tablet (50 mg total) by mouth every 12 (twelve) hours    Mixed hyperlipidemia    Check updated lipid panel. No history of ASCVD. Heart healthy diet encouraged.    Orders:    Lipid Panel with Direct LDL reflex; Future    CBC and differential; Future    Comprehensive metabolic panel; Future    Hyperglycemia    Check A1c for further evaluation of his glucose control. Has gained weight which puts him at risk for pre-diabetes/diabetes.    Orders:    Hemoglobin A1C; Future      Depression Screening and Follow-up Plan: Patient was screened for depression during today's encounter. They screened negative with a PHQ-2 score of 0.    History of Present Illness     History of Present Illness  The patient presents f to establish care. 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    He has experienced weight gain, which he attributes to his two knee surgeries in 2021. Additionally, he is suffering from bone-on-bone contact in his shoulders, which has negatively impacted his mobility. He reports no adverse effects from his current medication regimen. He has previously received shoulder injections, the most recent of which was in 2024. The most recent injection exacerbated his symptoms, leading him to believe that a nerve was struck during the injection. A shoulder replacement was proposed, however, the patient declined due to financial constraints. His last colonoscopy was performed in 2016.    The patient expresses a desire to have his testosterone levels tested due to symptoms indicative of testosterone deficiency. His last consultation with an endocrinologist was approximately one to two years ago.   He was  "a smoker, but he quit 14 years ago.   He denies any family history of colon cancer.     Review of Systems   Constitutional:  Positive for unexpected weight change. Negative for activity change, appetite change and fatigue.   Respiratory:  Negative for apnea, cough, chest tightness, shortness of breath and wheezing.    Cardiovascular:  Negative for chest pain, palpitations and leg swelling.   Gastrointestinal:  Negative for abdominal distention, abdominal pain, blood in stool, constipation, diarrhea, nausea and vomiting.   Musculoskeletal:  Positive for arthralgias.   Neurological:  Negative for dizziness, weakness, light-headedness, numbness and headaches.   Psychiatric/Behavioral:  Negative for behavioral problems, confusion, hallucinations, sleep disturbance and suicidal ideas. The patient is not nervous/anxious.      Objective     /80 (BP Location: Left arm, Patient Position: Sitting, Cuff Size: Large)   Pulse 79   Temp (!) 97.4 °F (36.3 °C) (Tympanic)   Resp 18   Ht 6' 4\" (1.93 m)   Wt (!) 137 kg (302 lb)   SpO2 96%   BMI 36.76 kg/m²     Physical Exam  Constitutional:       General: He is not in acute distress.     Appearance: He is obese. He is not ill-appearing.   Cardiovascular:      Rate and Rhythm: Normal rate and regular rhythm.      Heart sounds: No murmur heard.  Pulmonary:      Effort: Pulmonary effort is normal. No respiratory distress.      Breath sounds: No wheezing.   Abdominal:      General: Bowel sounds are normal. There is no distension.      Tenderness: There is no abdominal tenderness.   Musculoskeletal:      Right lower leg: No edema.      Left lower leg: No edema.   Neurological:      Mental Status: He is alert.       David Gray, DO   "

## 2024-06-07 NOTE — ASSESSMENT & PLAN NOTE
Check updated lipid panel. No history of ASCVD. Heart healthy diet encouraged.    Orders:    Lipid Panel with Direct LDL reflex; Future    CBC and differential; Future    Comprehensive metabolic panel; Future

## 2024-06-08 ENCOUNTER — APPOINTMENT (OUTPATIENT)
Age: 49
End: 2024-06-08
Payer: COMMERCIAL

## 2024-06-08 DIAGNOSIS — E78.2 MIXED HYPERLIPIDEMIA: ICD-10-CM

## 2024-06-08 DIAGNOSIS — E29.1 HYPOGONADISM IN MALE: ICD-10-CM

## 2024-06-08 DIAGNOSIS — Z12.5 SCREENING FOR PROSTATE CANCER: ICD-10-CM

## 2024-06-08 DIAGNOSIS — R73.9 HYPERGLYCEMIA: ICD-10-CM

## 2024-06-08 DIAGNOSIS — E55.9 VITAMIN D DEFICIENCY: ICD-10-CM

## 2024-06-08 LAB
25(OH)D3 SERPL-MCNC: 32.1 NG/ML (ref 30–100)
ALBUMIN SERPL BCP-MCNC: 4.3 G/DL (ref 3.5–5)
ALP SERPL-CCNC: 102 U/L (ref 34–104)
ALT SERPL W P-5'-P-CCNC: 42 U/L (ref 7–52)
ANION GAP SERPL CALCULATED.3IONS-SCNC: 12 MMOL/L (ref 4–13)
AST SERPL W P-5'-P-CCNC: 37 U/L (ref 13–39)
BASOPHILS # BLD AUTO: 0.03 THOUSANDS/ÂΜL (ref 0–0.1)
BASOPHILS NFR BLD AUTO: 1 % (ref 0–1)
BILIRUB SERPL-MCNC: 0.8 MG/DL (ref 0.2–1)
BUN SERPL-MCNC: 18 MG/DL (ref 5–25)
CALCIUM SERPL-MCNC: 8.7 MG/DL (ref 8.4–10.2)
CHLORIDE SERPL-SCNC: 103 MMOL/L (ref 96–108)
CHOLEST SERPL-MCNC: 204 MG/DL
CO2 SERPL-SCNC: 24 MMOL/L (ref 21–32)
CREAT SERPL-MCNC: 0.79 MG/DL (ref 0.6–1.3)
EOSINOPHIL # BLD AUTO: 0.15 THOUSAND/ÂΜL (ref 0–0.61)
EOSINOPHIL NFR BLD AUTO: 3 % (ref 0–6)
ERYTHROCYTE [DISTWIDTH] IN BLOOD BY AUTOMATED COUNT: 13 % (ref 11.6–15.1)
EST. AVERAGE GLUCOSE BLD GHB EST-MCNC: 146 MG/DL
ESTRADIOL SERPL-MCNC: 29.6 PG/ML
GFR SERPL CREATININE-BSD FRML MDRD: 103 ML/MIN/1.73SQ M
GLUCOSE P FAST SERPL-MCNC: 138 MG/DL (ref 65–99)
HBA1C MFR BLD: 6.7 %
HCT VFR BLD AUTO: 44.6 % (ref 36.5–49.3)
HDLC SERPL-MCNC: 59 MG/DL
HGB BLD-MCNC: 15 G/DL (ref 12–17)
IMM GRANULOCYTES # BLD AUTO: 0.02 THOUSAND/UL (ref 0–0.2)
IMM GRANULOCYTES NFR BLD AUTO: 0 % (ref 0–2)
LDLC SERPL CALC-MCNC: 125 MG/DL (ref 0–100)
LYMPHOCYTES # BLD AUTO: 0.79 THOUSANDS/ÂΜL (ref 0.6–4.47)
LYMPHOCYTES NFR BLD AUTO: 18 % (ref 14–44)
MCH RBC QN AUTO: 29.2 PG (ref 26.8–34.3)
MCHC RBC AUTO-ENTMCNC: 33.6 G/DL (ref 31.4–37.4)
MCV RBC AUTO: 87 FL (ref 82–98)
MONOCYTES # BLD AUTO: 0.37 THOUSAND/ÂΜL (ref 0.17–1.22)
MONOCYTES NFR BLD AUTO: 8 % (ref 4–12)
NEUTROPHILS # BLD AUTO: 3.1 THOUSANDS/ÂΜL (ref 1.85–7.62)
NEUTS SEG NFR BLD AUTO: 70 % (ref 43–75)
NRBC BLD AUTO-RTO: 0 /100 WBCS
PLATELET # BLD AUTO: 242 THOUSANDS/UL (ref 149–390)
PMV BLD AUTO: 11.6 FL (ref 8.9–12.7)
POTASSIUM SERPL-SCNC: 3.7 MMOL/L (ref 3.5–5.3)
PROT SERPL-MCNC: 7.2 G/DL (ref 6.4–8.4)
PSA SERPL-MCNC: 0.92 NG/ML (ref 0–4)
RBC # BLD AUTO: 5.14 MILLION/UL (ref 3.88–5.62)
SODIUM SERPL-SCNC: 139 MMOL/L (ref 135–147)
TRIGL SERPL-MCNC: 102 MG/DL
WBC # BLD AUTO: 4.46 THOUSAND/UL (ref 4.31–10.16)

## 2024-06-08 PROCEDURE — 82306 VITAMIN D 25 HYDROXY: CPT

## 2024-06-08 PROCEDURE — 83036 HEMOGLOBIN GLYCOSYLATED A1C: CPT

## 2024-06-08 PROCEDURE — 82670 ASSAY OF TOTAL ESTRADIOL: CPT

## 2024-06-08 PROCEDURE — 80053 COMPREHEN METABOLIC PANEL: CPT

## 2024-06-08 PROCEDURE — 80061 LIPID PANEL: CPT

## 2024-06-08 PROCEDURE — 36415 COLL VENOUS BLD VENIPUNCTURE: CPT

## 2024-06-08 PROCEDURE — G0103 PSA SCREENING: HCPCS

## 2024-06-08 PROCEDURE — 85025 COMPLETE CBC W/AUTO DIFF WBC: CPT

## 2024-06-10 LAB — SHBG SERPL-SCNC: 42.5 NMOL/L (ref 19.3–76.4)

## 2024-08-26 DIAGNOSIS — I10 ESSENTIAL HYPERTENSION: ICD-10-CM

## 2024-08-26 RX ORDER — METOPROLOL TARTRATE 50 MG
50 TABLET ORAL EVERY 12 HOURS SCHEDULED
Qty: 180 TABLET | Refills: 1 | Status: SHIPPED | OUTPATIENT
Start: 2024-08-26

## 2024-10-24 ENCOUNTER — APPOINTMENT (OUTPATIENT)
Dept: RADIOLOGY | Facility: CLINIC | Age: 49
End: 2024-10-24
Payer: COMMERCIAL

## 2024-10-24 ENCOUNTER — OFFICE VISIT (OUTPATIENT)
Dept: OBGYN CLINIC | Facility: CLINIC | Age: 49
End: 2024-10-24
Payer: COMMERCIAL

## 2024-10-24 VITALS
SYSTOLIC BLOOD PRESSURE: 160 MMHG | WEIGHT: 298 LBS | HEART RATE: 79 BPM | HEIGHT: 76 IN | RESPIRATION RATE: 18 BRPM | BODY MASS INDEX: 36.29 KG/M2 | DIASTOLIC BLOOD PRESSURE: 103 MMHG

## 2024-10-24 DIAGNOSIS — M22.2X1 PATELLOFEMORAL SYNDROME OF RIGHT KNEE: Primary | ICD-10-CM

## 2024-10-24 DIAGNOSIS — M62.9 HAMSTRING TIGHTNESS OF BOTH LOWER EXTREMITIES: ICD-10-CM

## 2024-10-24 DIAGNOSIS — M17.11 PRIMARY OSTEOARTHRITIS OF RIGHT KNEE: ICD-10-CM

## 2024-10-24 DIAGNOSIS — M25.561 ACUTE PAIN OF RIGHT KNEE: ICD-10-CM

## 2024-10-24 PROCEDURE — 73562 X-RAY EXAM OF KNEE 3: CPT

## 2024-10-24 PROCEDURE — 99203 OFFICE O/P NEW LOW 30 MIN: CPT | Performed by: FAMILY MEDICINE

## 2024-10-24 NOTE — PROGRESS NOTES
Assessment/Plan:  Assessment & Plan   Diagnoses and all orders for this visit:    Patellofemoral syndrome of right knee  -     Brace  -     Ambulatory Referral to Physical Therapy; Future    Primary osteoarthritis of right knee    Hamstring tightness of both lower extremities  -     Ambulatory Referral to Physical Therapy; Future    Other orders  -     Cancel: XR knee 1 or 2 vw right; Future      49-year-old male with right knee pain and popping 3 days duration.   Discussed with patient physical exam, imaging studies, impression, and plan.  X-rays of the right knee noted for degenerative changes.  Imaging studies, clinical exam, and history suggest that he has symptoms from abnormal patellofemoral mechanics.  I discussed treatment regimen of bracing and formal therapy.  Surgery is not warranted.  I provided patellar stabilizing brace to wear during physical activity.  He is to start physical therapy as soon as possible and do home exercises as directed.  He will follow-up as needed.            Subjective:   Patient ID: Sarath Wu is a 49 y.o. male.  Chief Complaint   Patient presents with    Right Knee - Pain        49-year-old male presents for evaluation right knee pain and popping 3 days duration.  He reports stepping out of his vehicle after a long car ride and his knee felt locked.  He planted on the leg and he felt a pain in the.  He had pain described as sudden in onset, generalized to the knee but worse to the anterior and posterior aspect, radiating proximally to the posterior thigh, worse with extending and bending the knee, associated with popping sensation, and improved with resting.  He treated with elevation and icing.  He states that he is able to stand and walk without any issue.  He reports that symptoms are reproducible when rising after prolonged sitting.    Knee Pain  The current episode started in the past 7 days. The problem occurs daily. The problem has been waxing and waning. Associated  "symptoms include arthralgias. Pertinent negatives include no joint swelling, numbness or weakness. Exacerbated by: Rising from prolonged sitting. He has tried rest, position changes, acetaminophen and ice for the symptoms. The treatment provided moderate relief.           The following portions of the patient's history were reviewed and updated as appropriate: He  has a past medical history of Hyperlipidemia, Hypertension, and Obesity.  He is allergic to loratadine and metformin..    Review of Systems   Musculoskeletal:  Positive for arthralgias. Negative for joint swelling.   Neurological:  Negative for weakness and numbness.       Objective:  Vitals:    10/24/24 0940   BP: (!) 160/103   Pulse: 79   Resp: 18   Weight: 135 kg (298 lb)   Height: 6' 4\" (1.93 m)      Right Knee Exam     Muscle Strength   The patient has normal right knee strength.    Tenderness   The patient is experiencing no tenderness.     Range of Motion   Extension:  normal   Flexion:  130     Tests   Alondra:  Medial - negative Lateral - negative  Varus: negative Valgus: negative    Other   Swelling: none    Comments:  Negative patella inhibition and grind      Right Hip Exam     Muscle Strength   Flexion: 5/5     Tests   FARHAN: negative    Comments:  Negative FADDIR  Hamstring tightness            Physical Exam  Vitals and nursing note reviewed.   Constitutional:       Appearance: Normal appearance. He is well-developed. He is not ill-appearing or diaphoretic.   HENT:      Head: Normocephalic and atraumatic.      Right Ear: External ear normal.      Left Ear: External ear normal.   Eyes:      Conjunctiva/sclera: Conjunctivae normal.   Neck:      Trachea: No tracheal deviation.   Cardiovascular:      Rate and Rhythm: Normal rate.   Pulmonary:      Effort: Pulmonary effort is normal. No respiratory distress.   Abdominal:      General: There is no distension.   Musculoskeletal:         General: No tenderness.      Right knee:      Instability " Tests: Medial Alondra test negative and lateral Alondra test negative.   Skin:     General: Skin is warm and dry.      Coloration: Skin is not jaundiced or pale.   Neurological:      Mental Status: He is alert and oriented to person, place, and time.   Psychiatric:         Mood and Affect: Mood normal.         Behavior: Behavior normal.         Thought Content: Thought content normal.         Judgment: Judgment normal.         I have personally reviewed pertinent films in PACS and my interpretation is  .  Degenerative changes right knee.  No acute osseous abnormality right knee.      More than 30 minutes spent reviewing patient chart, reviewing and interpreting imaging studies, obtaining history from patient, examining patient, discussing and implementing treatment plan.

## 2025-02-10 ENCOUNTER — OFFICE VISIT (OUTPATIENT)
Age: 50
End: 2025-02-10
Payer: COMMERCIAL

## 2025-02-10 ENCOUNTER — DOCUMENTATION (OUTPATIENT)
Dept: ADMINISTRATIVE | Facility: OTHER | Age: 50
End: 2025-02-10

## 2025-02-10 VITALS
HEART RATE: 58 BPM | OXYGEN SATURATION: 96 % | SYSTOLIC BLOOD PRESSURE: 160 MMHG | TEMPERATURE: 97.2 F | RESPIRATION RATE: 18 BRPM | BODY MASS INDEX: 35.98 KG/M2 | WEIGHT: 295.6 LBS | DIASTOLIC BLOOD PRESSURE: 100 MMHG

## 2025-02-10 VITALS — SYSTOLIC BLOOD PRESSURE: 160 MMHG | DIASTOLIC BLOOD PRESSURE: 100 MMHG

## 2025-02-10 DIAGNOSIS — H66.92 LEFT OTITIS MEDIA, UNSPECIFIED OTITIS MEDIA TYPE: Primary | ICD-10-CM

## 2025-02-10 DIAGNOSIS — R42 VERTIGO: ICD-10-CM

## 2025-02-10 DIAGNOSIS — R11.0 NAUSEA: ICD-10-CM

## 2025-02-10 PROCEDURE — S9083 URGENT CARE CENTER GLOBAL: HCPCS | Performed by: PHYSICIAN ASSISTANT

## 2025-02-10 PROCEDURE — G0383 LEV 4 HOSP TYPE B ED VISIT: HCPCS | Performed by: PHYSICIAN ASSISTANT

## 2025-02-10 RX ORDER — ONDANSETRON 4 MG/1
4 TABLET, ORALLY DISINTEGRATING ORAL EVERY 6 HOURS PRN
Qty: 9 TABLET | Refills: 0 | Status: SHIPPED | OUTPATIENT
Start: 2025-02-10 | End: 2025-02-13 | Stop reason: SDUPTHER

## 2025-02-10 RX ORDER — AMOXICILLIN 875 MG/1
875 TABLET, COATED ORAL 2 TIMES DAILY
Qty: 20 TABLET | Refills: 0 | Status: SHIPPED | OUTPATIENT
Start: 2025-02-10 | End: 2025-02-20

## 2025-02-10 RX ORDER — CIPROFLOXACIN AND DEXAMETHASONE 3; 1 MG/ML; MG/ML
4 SUSPENSION/ DROPS AURICULAR (OTIC) 2 TIMES DAILY
Qty: 7.5 ML | Refills: 0 | Status: SHIPPED | OUTPATIENT
Start: 2025-02-10 | End: 2025-02-17

## 2025-02-10 RX ORDER — MECLIZINE HYDROCHLORIDE 25 MG/1
25 TABLET ORAL EVERY 8 HOURS PRN
Qty: 30 TABLET | Refills: 0 | Status: SHIPPED | OUTPATIENT
Start: 2025-02-10

## 2025-02-10 NOTE — PROGRESS NOTES
[facility]        NAME: Sarath Wu is a 49 y.o. male  : 1975    MRN: [id number]  DATE: February 10, 2025  TIME: 10:53 AM    Assessment and Plan   Left otitis media, unspecified otitis media type [H66.92]  1. Left otitis media, unspecified otitis media type  amoxicillin (AMOXIL) 875 mg tablet    ciprofloxacin-dexamethasone (CIPRODEX) otic suspension    meclizine (ANTIVERT) 25 mg tablet    ondansetron (ZOFRAN-ODT) 4 mg disintegrating tablet      2. Nausea  amoxicillin (AMOXIL) 875 mg tablet    ciprofloxacin-dexamethasone (CIPRODEX) otic suspension    meclizine (ANTIVERT) 25 mg tablet    ondansetron (ZOFRAN-ODT) 4 mg disintegrating tablet      3. Vertigo  amoxicillin (AMOXIL) 875 mg tablet    ciprofloxacin-dexamethasone (CIPRODEX) otic suspension    meclizine (ANTIVERT) 25 mg tablet    ondansetron (ZOFRAN-ODT) 4 mg disintegrating tablet        Discharged in stable condition. Reviewed at length with patient and his wife red flag signs and symptoms which should prompt emergent reevaluation.     Patient Instructions     Patient Instructions     New Medications Ordered This Visit   Medications    amoxicillin (AMOXIL) 875 mg tablet     Sig: Take 1 tablet (875 mg total) by mouth 2 (two) times a day for 10 days     Dispense:  20 tablet     Refill:  0    ciprofloxacin-dexamethasone (CIPRODEX) otic suspension     Sig: Administer 4 drops into the left ear 2 (two) times a day for 7 days     Dispense:  7.5 mL     Refill:  0    meclizine (ANTIVERT) 25 mg tablet     Sig: Take 1 tablet (25 mg total) by mouth every 8 (eight) hours as needed for dizziness     Dispense:  30 tablet     Refill:  0    ondansetron (ZOFRAN-ODT) 4 mg disintegrating tablet     Sig: Take 1 tablet (4 mg total) by mouth every 6 (six) hours as needed for nausea or vomiting     Dispense:  9 tablet     Refill:  0       Take tylenol for pain as needed.  Continue to monitor blood pressure at home.   Keep ear clean and dry.   See PCP in 4-5 days  for recheck.   Go to the ER for any changing or worsening symptoms including new headache, uncontrolled vomiting, vision changes, weakness, numbness, tingling, slurred speech, face droop, or any other concerning symptoms      Chief Complaint     Chief Complaint   Patient presents with    Earache     L earache, dizziness, decreased balance, nausea X 1 day         History of Present Illness       HPI  He presents with one day of left ear pain and vertigo.   With nausea, comes and goes in waves, also started yesterday.   Dizzy when walking, has to hold on to something, feels like a hangover, spinning feeling.  Improved with sitting, best laying down  Feels cloudy in head but no headache, nasal congestion  Notes occasional spots in vision if turning head fast but no vision loss or blurred vision  Had this years ago- with ear infection.   Denies head trauma  No cough, sore throat, fever, rhinorrhea, sneezing.  No sick contacts.   Denies slurred speech, dysphagia, face droop, numbness, tingling, weakness of the extremities, uncontrolled vomiting.   Takes blood pressure medicine daily but did not take yet today, notes it's normally a bit lower than what is in the office today.  Trying  ciprodex.  Review of Systems   Review of Systems  As per HPI    Current Medications       Current Outpatient Medications:     ADVOCATE LANCETS MISC, by Does not apply route daily Patient Tests Once a Day. DX: E11.9, Disp: 100 each, Rfl: 5    amoxicillin (AMOXIL) 875 mg tablet, Take 1 tablet (875 mg total) by mouth 2 (two) times a day for 10 days, Disp: 20 tablet, Rfl: 0    ascorbic acid (VITAMIN C) 1000 MG tablet, Take 1 tablet by mouth as needed On hold for surgery, Disp: , Rfl:     cholecalciferol (VITAMIN D3) 1,000 units tablet, Take 5,000 Units by mouth daily On hold for surgery, Disp: , Rfl:     ciprofloxacin-dexamethasone (CIPRODEX) otic suspension, Administer 4 drops into the left ear 2 (two) times a day for 7 days, Disp: 7.5  mL, Rfl: 0    ibuprofen (MOTRIN) 800 mg tablet, Take 1 tablet (800 mg total) by mouth every 8 (eight) hours as needed for mild pain Take 1 tablet 3 times daily, then take only as needed. Take with food and water. Discontinue if stomach upset occurs., Disp: 30 tablet, Rfl: 0    meclizine (ANTIVERT) 25 mg tablet, Take 1 tablet (25 mg total) by mouth every 8 (eight) hours as needed for dizziness, Disp: 30 tablet, Rfl: 0    metoprolol tartrate (LOPRESSOR) 50 mg tablet, Take 1 tablet (50 mg total) by mouth every 12 (twelve) hours, Disp: 180 tablet, Rfl: 1    multivitamin (THERAGRAN) TABS, Take 1 tablet by mouth daily On hold for surgery, Disp: , Rfl:     NON FORMULARY, Take by mouth in the morning Shilajit tablet, on hold for surgery, Disp: , Rfl:     ondansetron (ZOFRAN-ODT) 4 mg disintegrating tablet, Take 1 tablet (4 mg total) by mouth every 6 (six) hours as needed for nausea or vomiting, Disp: 9 tablet, Rfl: 0    Current Allergies     Allergies as of 02/10/2025 - Reviewed 02/10/2025   Allergen Reaction Noted    Loratadine Hives     Metformin Itching             The following portions of the patient's history were reviewed and updated as appropriate: allergies, current medications, past family history, past medical history, past social history, past surgical history and problem list.     Past Medical History:   Diagnosis Date    Hyperlipidemia     Hypertension     Obesity        Past Surgical History:   Procedure Laterality Date    COLONOSCOPY      INCISION AND DRAINAGE OF WOUND Right 09/07/2017    Procedure: INCISION AND DRAINAGE (I&D) BUTTOCK;  Surgeon: Naveen Balderas MD;  Location: [facility];  Service: General    KNEE ARTHROSCOPY Right 2020    [facility] KNE SURG W/MENISCECTOMY MED/LAT W/[facility]VG Left 12/28/2022    Procedure: ARTHROSCOPY KNEE- Left Knee partial lateral and medial menisectomy;  Surgeon: Celestino Cobos DO;  Location: [facility];  Service: Orthopedics    VASECTOMY         Family History   Problem  Relation Age of Onset    Diabetes Mother     Thyroid disease Mother     Hypertension Father          Medications have been verified.        Objective   /100 (BP Location: Right arm, Patient Position: Sitting, Cuff Size: Large)   Pulse 58   Temp (!) 97.2 °F (36.2 °C) (Tympanic)   Resp 18   Wt 134 kg (295 lb 9.6 oz)   SpO2 96%   BMI 35.98 kg/m²        Physical Exam     Physical Exam  Vitals and nursing note reviewed.   Constitutional:       General: He is not in acute distress.     Appearance: He is well-developed. He is not ill-appearing or toxic-appearing.   HENT:      Head: Normocephalic and atraumatic.      Right Ear: Tympanic membrane and ear canal normal. No middle ear effusion.      Left Ear: External ear normal. Drainage (Purulent discharge in distal canal, mild), swelling (MIld swelling and erythema of canal) and tenderness present. A middle ear effusion is present. There is no impacted cerumen. Tympanic membrane is injected. Tympanic membrane is not retracted or bulging.      Nose: Mucosal edema present. No congestion or rhinorrhea.      Right Sinus: No maxillary sinus tenderness or frontal sinus tenderness.      Left Sinus: No maxillary sinus tenderness or frontal sinus tenderness.      Mouth/Throat:      Mouth: Mucous membranes are moist.      Pharynx: No pharyngeal swelling, oropharyngeal exudate, posterior oropharyngeal erythema or uvula swelling.      Tonsils: No tonsillar abscesses.   Eyes:      Conjunctiva/sclera: Conjunctivae normal.      Pupils: Pupils are equal, round, and reactive to light.   Cardiovascular:      Rate and Rhythm: Normal rate and regular rhythm.      Heart sounds: Normal heart sounds.   Pulmonary:      Effort: Pulmonary effort is normal. No respiratory distress.      Breath sounds: Normal breath sounds.   Abdominal:      General: There is no distension.      Palpations: Abdomen is soft.   Musculoskeletal:      Cervical back: Normal range of motion and neck supple.    Lymphadenopathy:      Cervical: No cervical adenopathy.   Skin:     General: Skin is warm and dry.      Capillary Refill: Capillary refill takes less than 2 seconds.   Neurological:      General: No focal deficit present.      Mental Status: He is alert and oriented to person, place, and time.      GCS: GCS eye subscore is 4. GCS verbal subscore is 5. GCS motor subscore is 6.      Cranial Nerves: Cranial nerves 2-12 are intact. No dysarthria or facial asymmetry.      Motor: No tremor, abnormal muscle tone, seizure activity or pronator drift.      Coordination: Romberg sign negative. Coordination normal. Finger-Nose-Finger Test and Heel to Shin Test normal.   Psychiatric:         Mood and Affect: Mood normal.         Behavior: Behavior normal.

## 2025-02-10 NOTE — PROGRESS NOTES
02/11/25 9:12 AM    Patient was called after the Urgent Care visit ; there was no answer/ a message could not be left.    Thank you.  NICKIE FOY MA  [facility]          Blood pressure elevated  Appointment department: [facility]  Appointment provider: * No providers found *  Blood pressure   02/10/25 1001 160/100   02/10/25 0956 (!) 170/106

## 2025-02-10 NOTE — PATIENT INSTRUCTIONS
New Medications Ordered This Visit   Medications    amoxicillin (AMOXIL) 875 mg tablet     Sig: Take 1 tablet (875 mg total) by mouth 2 (two) times a day for 10 days     Dispense:  20 tablet     Refill:  0    ciprofloxacin-dexamethasone (CIPRODEX) otic suspension     Sig: Administer 4 drops into the left ear 2 (two) times a day for 7 days     Dispense:  7.5 mL     Refill:  0    meclizine (ANTIVERT) 25 mg tablet     Sig: Take 1 tablet (25 mg total) by mouth every 8 (eight) hours as needed for dizziness     Dispense:  30 tablet     Refill:  0    ondansetron (ZOFRAN-ODT) 4 mg disintegrating tablet     Sig: Take 1 tablet (4 mg total) by mouth every 6 (six) hours as needed for nausea or vomiting     Dispense:  9 tablet     Refill:  0       Take tylenol for pain as needed.  Continue to monitor blood pressure at home.   Keep ear clean and dry.   See PCP in 4-5 days for recheck.   Go to the ER for any changing or worsening symptoms including new headache, uncontrolled vomiting, vision changes, weakness, numbness, tingling, slurred speech, face droop, or any other concerning symptoms

## 2025-02-10 NOTE — LETTER
February 10, 2025     Patient: Sarath Wu   YOB: 1975   Date of Visit: 2/10/2025       To Whom it May Concern:    Sarath Wu was seen in my clinic on 2/10/2025. He may return to work on 2/12/25 .    If you have any questions or concerns, please don't hesitate to call.         Sincerely,          Meredith Calvo PA-C        CC: No Recipients

## 2025-02-13 ENCOUNTER — OFFICE VISIT (OUTPATIENT)
Age: 50
End: 2025-02-13
Payer: COMMERCIAL

## 2025-02-13 VITALS
DIASTOLIC BLOOD PRESSURE: 110 MMHG | WEIGHT: 303 LBS | OXYGEN SATURATION: 97 % | HEIGHT: 76 IN | RESPIRATION RATE: 18 BRPM | HEART RATE: 84 BPM | BODY MASS INDEX: 36.9 KG/M2 | SYSTOLIC BLOOD PRESSURE: 174 MMHG | TEMPERATURE: 97.1 F

## 2025-02-13 DIAGNOSIS — H65.02 NON-RECURRENT ACUTE SEROUS OTITIS MEDIA OF LEFT EAR: ICD-10-CM

## 2025-02-13 DIAGNOSIS — I10 ESSENTIAL HYPERTENSION: Primary | ICD-10-CM

## 2025-02-13 DIAGNOSIS — R11.0 NAUSEA: ICD-10-CM

## 2025-02-13 DIAGNOSIS — R73.9 HYPERGLYCEMIA: ICD-10-CM

## 2025-02-13 DIAGNOSIS — E78.2 MIXED HYPERLIPIDEMIA: ICD-10-CM

## 2025-02-13 PROCEDURE — 99214 OFFICE O/P EST MOD 30 MIN: CPT

## 2025-02-13 RX ORDER — LISINOPRIL 10 MG/1
10 TABLET ORAL DAILY
Qty: 30 TABLET | Refills: 2 | Status: SHIPPED | OUTPATIENT
Start: 2025-02-13 | End: 2025-05-14

## 2025-02-13 RX ORDER — ONDANSETRON 4 MG/1
4 TABLET, ORALLY DISINTEGRATING ORAL EVERY 6 HOURS PRN
Qty: 9 TABLET | Refills: 0 | Status: SHIPPED | OUTPATIENT
Start: 2025-02-13

## 2025-02-13 NOTE — ASSESSMENT & PLAN NOTE
Uncontrolled at 174/100 today.  Patient did not take his metoprolol yet today, forgot    Continue metoprolol 50 mg every 12 hours  Begin lisinopril 10 mg daily    Follow-up in 2 weeks for blood pressure recheck, patient advised to bring cuff and to appointment to check accuracy    Discussed long-term complications of uncontrolled hypertension and importance of getting under control, goal of less than 140/90  Reviewed signs and symptoms of hypertensive emergency that warrant immediate follow-up, currently negative  Orders:    Comprehensive metabolic panel; Future    Albumin / creatinine urine ratio; Future    CBC and differential; Future    lisinopril (ZESTRIL) 10 mg tablet; Take 1 tablet (10 mg total) by mouth daily

## 2025-02-13 NOTE — PROGRESS NOTES
Name: Sarath Wu      : 1975      MRN: [id number]  Encounter Provider: Galo Crane PA-C  Encounter Date: 2025   Encounter department: [facility] [place]    Assessment & Plan  Essential hypertension  Uncontrolled at 174/100 today.  Patient did not take his metoprolol yet today, forgot    Continue metoprolol 50 mg every 12 hours  Begin lisinopril 10 mg daily    Follow-up in 2 weeks for blood pressure recheck, patient advised to bring cuff and to appointment to check accuracy    Discussed long-term complications of uncontrolled hypertension and importance of getting under control, goal of less than 140/90  Reviewed signs and symptoms of hypertensive emergency that warrant immediate follow-up, currently negative  Orders:    Comprehensive metabolic panel; Future    Albumin / creatinine urine ratio; Future    CBC and differential; Future    lisinopril (ZESTRIL) 10 mg tablet; Take 1 tablet (10 mg total) by mouth daily    Hyperglycemia  A1c reviewed from  last year, 6.7.  Update labs in 3 months  Orders:    Hemoglobin A1C; Future    Albumin / creatinine urine ratio; Future    Mixed hyperlipidemia    Orders:    Lipid Panel with Direct LDL reflex; Future    Nausea    Orders:    ondansetron (ZOFRAN-ODT) 4 mg disintegrating tablet; Take 1 tablet (4 mg total) by mouth every 6 (six) hours as needed for nausea or vomiting    Non-recurrent acute serous otitis media of left ear  Physical exam unremarkable  Finish antibiotic course  Follow-up as needed for any new or worsening symptoms  Has some dizziness associated with the ear infection, continue meclizine as needed as previously prescribed    Reviewed concerning signs and symptoms of dizziness, send symptoms started when beginning antibiotic, do not recommend further workup at this time given no other red flag symptoms.  Continue to monitor, follow-up in 2 weeks for blood pressure recheck                  History of Present  Illness       Patient presents today for urgent care follow-up for an ear infection.  Ear pain has improved.  Does feel little bit nauseous, constipated and reduced appetite from antibiotics.  Has about 6 days left of amoxicillin    Review of Systems  Medical History Reviewed by provider this encounter:     .  Past Medical History   Past Medical History:   Diagnosis Date    Hyperlipidemia     Hypertension     Obesity      Past Surgical History:   Procedure Laterality Date    COLONOSCOPY      INCISION AND DRAINAGE OF WOUND Right 09/07/2017    Procedure: INCISION AND DRAINAGE (I&D) BUTTOCK;  Surgeon: Naveen Balderas MD;  Location: [facility];  Service: General    KNEE ARTHROSCOPY Right 2020    ME ARTHRS KNE SURG W/MENISCECTOMY MED/LAT W/SHVG Left 12/28/2022    Procedure: ARTHROSCOPY KNEE- Left Knee partial lateral and medial menisectomy;  Surgeon: Celestino Cobos DO;  Location: [facility];  Service: Orthopedics    VASECTOMY       Family History   Problem Relation Age of Onset    Diabetes Mother     Thyroid disease Mother     Hypertension Father       reports that he quit smoking about 15 years ago. His smoking use included cigarettes. He started smoking about 35 years ago. He has a 40 pack-year smoking history. He has never used smokeless tobacco. He reports current alcohol use. He reports that he does not use drugs.  Current Outpatient Medications on File Prior to Visit   Medication Sig Dispense Refill    ADVOCATE LANCETS MISC by Does not apply route daily Patient Tests Once a Day. DX: E11.9 100 each 5    amoxicillin (AMOXIL) 875 mg tablet Take 1 tablet (875 mg total) by mouth 2 (two) times a day for 10 days 20 tablet 0    ascorbic acid (VITAMIN C) 1000 MG tablet Take 1 tablet by mouth as needed On hold for surgery      cholecalciferol (VITAMIN D3) 1,000 units tablet Take 5,000 Units by mouth daily On hold for surgery      ciprofloxacin-dexamethasone (CIPRODEX) otic suspension Administer 4 drops into the left ear 2  (two) times a day for 7 days 7.5 mL 0    ibuprofen (MOTRIN) 800 mg tablet Take 1 tablet (800 mg total) by mouth every 8 (eight) hours as needed for mild pain Take 1 tablet 3 times daily, then take only as needed. Take with food and water. Discontinue if stomach upset occurs. 30 tablet 0    meclizine (ANTIVERT) 25 mg tablet Take 1 tablet (25 mg total) by mouth every 8 (eight) hours as needed for dizziness 30 tablet 0    metoprolol tartrate (LOPRESSOR) 50 mg tablet Take 1 tablet (50 mg total) by mouth every 12 (twelve) hours 180 tablet 1    multivitamin (THERAGRAN) TABS Take 1 tablet by mouth daily On hold for surgery      NON FORMULARY Take by mouth in the morning Shilajit tablet, on hold for surgery      ondansetron (ZOFRAN-ODT) 4 mg disintegrating tablet Take 1 tablet (4 mg total) by mouth every 6 (six) hours as needed for nausea or vomiting 9 tablet 0     No current facility-administered medications on file prior to visit.     Allergies   Allergen Reactions    Loratadine Hives    Metformin Itching      Current Outpatient Medications on File Prior to Visit   Medication Sig Dispense Refill    ADVOCATE LANCETS MISC by Does not apply route daily Patient Tests Once a Day. DX: E11.9 100 each 5    amoxicillin (AMOXIL) 875 mg tablet Take 1 tablet (875 mg total) by mouth 2 (two) times a day for 10 days 20 tablet 0    ascorbic acid (VITAMIN C) 1000 MG tablet Take 1 tablet by mouth as needed On hold for surgery      cholecalciferol (VITAMIN D3) 1,000 units tablet Take 5,000 Units by mouth daily On hold for surgery      ciprofloxacin-dexamethasone (CIPRODEX) otic suspension Administer 4 drops into the left ear 2 (two) times a day for 7 days 7.5 mL 0    ibuprofen (MOTRIN) 800 mg tablet Take 1 tablet (800 mg total) by mouth every 8 (eight) hours as needed for mild pain Take 1 tablet 3 times daily, then take only as needed. Take with food and water. Discontinue if stomach upset occurs. 30 tablet 0    meclizine (ANTIVERT) 25 mg  tablet Take 1 tablet (25 mg total) by mouth every 8 (eight) hours as needed for dizziness 30 tablet 0    metoprolol tartrate (LOPRESSOR) 50 mg tablet Take 1 tablet (50 mg total) by mouth every 12 (twelve) hours 180 tablet 1    multivitamin (THERAGRAN) TABS Take 1 tablet by mouth daily On hold for surgery      NON FORMULARY Take by mouth in the morning Shilajit tablet, on hold for surgery      ondansetron (ZOFRAN-ODT) 4 mg disintegrating tablet Take 1 tablet (4 mg total) by mouth every 6 (six) hours as needed for nausea or vomiting 9 tablet 0     No current facility-administered medications on file prior to visit.      Social History     Tobacco Use    Smoking status: Former     Current packs/day: 0.00     Average packs/day: 2.0 packs/day for 20.0 years (40.0 ttl pk-yrs)     Types: Cigarettes     Start date: 1990     Quit date: 2010     Years since quitting: 15.1    Smokeless tobacco: Never   Vaping Use    Vaping status: Never Used   Substance and Sexual Activity    Alcohol use: Yes     Comment: rare on holidays    Drug use: No    Sexual activity: Yes     Partners: Female       Objective   There were no vitals taken for this visit.    Physical Exam  Vitals and nursing note reviewed.   Constitutional:       General: He is not in acute distress.     Appearance: He is normal weight. He is not ill-appearing, toxic-appearing or diaphoretic.   HENT:      Head: Normocephalic and atraumatic.      Right Ear: Tympanic membrane and external ear normal. There is no impacted cerumen.      Left Ear: Tympanic membrane and external ear normal. There is no impacted cerumen.      Nose: Nose normal.      Mouth/Throat:      Mouth: Mucous membranes are moist.      Pharynx: Oropharynx is clear. No oropharyngeal exudate or posterior oropharyngeal erythema.   Eyes:      General: No scleral icterus.        Right eye: No discharge.         Left eye: No discharge.      Extraocular Movements: Extraocular movements intact.       "Conjunctiva/sclera: Conjunctivae normal.   Cardiovascular:      Rate and Rhythm: Normal rate.   Pulmonary:      Effort: Pulmonary effort is normal. No respiratory distress.   Musculoskeletal:      Cervical back: Normal range of motion and neck supple.      Right lower leg: No edema.      Left lower leg: No edema.   Skin:     Findings: No rash.   Neurological:      Mental Status: He is alert. Mental status is at baseline.   Psychiatric:         Mood and Affect: Mood normal.         Behavior: Behavior normal.         Thought Content: Thought content normal.         Judgment: Judgment normal.       Portions of the record may have been created with voice recognition software. Occasional wrong word or \"sound a like\" substitutions may have occurred due to the inherent limitations of voice recognition software. Read the chart carefully and recognize, using context, where substitutions have occurred.    "

## 2025-02-13 NOTE — ASSESSMENT & PLAN NOTE
A1c reviewed from [facility] last year, 6.7.  Update labs in 3 months  Orders:    Hemoglobin A1C; Future    Albumin / creatinine urine ratio; Future

## 2025-02-17 DIAGNOSIS — I10 ESSENTIAL HYPERTENSION: ICD-10-CM

## 2025-02-17 RX ORDER — METOPROLOL TARTRATE 50 MG
50 TABLET ORAL EVERY 12 HOURS
Qty: 180 TABLET | Refills: 3 | Status: SHIPPED | OUTPATIENT
Start: 2025-02-17

## 2025-02-21 DIAGNOSIS — I10 ESSENTIAL HYPERTENSION: ICD-10-CM

## 2025-02-21 RX ORDER — METOPROLOL TARTRATE 50 MG
50 TABLET ORAL EVERY 12 HOURS
Qty: 180 TABLET | Refills: 0 | OUTPATIENT
Start: 2025-02-21

## 2025-03-20 ENCOUNTER — TELEPHONE (OUTPATIENT)
Age: 50
End: 2025-03-20

## 2025-05-22 ENCOUNTER — OFFICE VISIT (OUTPATIENT)
Age: 50
End: 2025-05-22
Payer: COMMERCIAL

## 2025-05-22 VITALS
WEIGHT: 286 LBS | OXYGEN SATURATION: 96 % | SYSTOLIC BLOOD PRESSURE: 164 MMHG | BODY MASS INDEX: 34.81 KG/M2 | TEMPERATURE: 98.4 F | DIASTOLIC BLOOD PRESSURE: 96 MMHG | HEART RATE: 61 BPM | RESPIRATION RATE: 18 BRPM

## 2025-05-22 DIAGNOSIS — L08.9 INFECTED CYST OF SKIN: Primary | ICD-10-CM

## 2025-05-22 DIAGNOSIS — L72.9 INFECTED CYST OF SKIN: Primary | ICD-10-CM

## 2025-05-22 PROCEDURE — G0382 LEV 3 HOSP TYPE B ED VISIT: HCPCS | Performed by: PHYSICIAN ASSISTANT

## 2025-05-22 PROCEDURE — S9083 URGENT CARE CENTER GLOBAL: HCPCS | Performed by: PHYSICIAN ASSISTANT

## 2025-05-22 RX ORDER — SULFAMETHOXAZOLE AND TRIMETHOPRIM 800; 160 MG/1; MG/1
1 TABLET ORAL EVERY 12 HOURS SCHEDULED
Qty: 14 TABLET | Refills: 0 | Status: SHIPPED | OUTPATIENT
Start: 2025-05-22 | End: 2025-05-29

## 2025-05-22 NOTE — PROGRESS NOTES
[facility]        NAME: Sarath Wu is a 50 y.o. male  : 1975    MRN: [id number]  DATE: May 22, 2025  TIME: 7:24 PM    Assessment and Plan   Infected cyst of skin [L72.9, L08.9]  1. Infected cyst of skin  sulfamethoxazole-trimethoprim (Bactrim DS) 800-160 mg per tablet          No fluid collection to drain, Bactrim rx'ed, warm compresses BID    The patient and/or parent/legal guardian verbalized understanding of exam findings and   Treatment plan. We engaged in the shared decision-making process and treatment options were   discussed at length with the patient.  All questions, concerns and complaints were answered and   addressed to the patient's' and/or parent/legal guardians's satisfaction.    Patient Instructions   There are no Patient Instructions on file for this visit.    Follow up with PCP in 3-5 days.  Proceed to  ER if symptoms worsen.    If tests are performed, our office will contact you with results only if   changes need to made to the care plan discussed with you at the visit.   You can review your full results on [facility].     Chief Complaint     Chief Complaint   Patient presents with   • Abscess     Abscess/ growth not open and draining area to right posterior gluteal fold. Cleansing with soap and water started yesterday.         History of Present Illness       HPI  PT reports concern for abscess/growth not open and draining yet. Located in right posterior gluteal fold. Cleansed with soap and water. No fever or chills.. Tried popping at home by wife didn't work.     Review of Systems   Review of Systems  All other related systems reviewed with patient or accompanying historian and are negative except as noted in HPI    Current Medications     Current Medications[1]    Current Allergies     Allergies as of 2025 - Reviewed 2025   Allergen Reaction Noted   • Loratadine Hives    • Metformin Itching             The following portions of the patient's history were  "reviewed and updated as appropriate: allergies, current medications, past family history, past medical history, past social history, past surgical history and problem list.     Past Medical History[2]    Past Surgical History[3]    Family History[4]      Medications have been verified.        Objective   /96 (BP Location: Right arm, Patient Position: Sitting)   Pulse 61   Temp 98.4 °F (36.9 °C) (Tympanic)   Resp 18   Wt 130 kg (286 lb)   SpO2 96%   BMI 34.81 kg/m²   No LMP for male patient.       Physical Exam     Physical Exam  Constitutional:       General: He is not in acute distress.     Appearance: He is well-developed.   HENT:      Head: Normocephalic and atraumatic.     Eyes:      General: No scleral icterus.     Conjunctiva/sclera: Conjunctivae normal.     Pulmonary:      Effort: Pulmonary effort is normal. No respiratory distress.      Breath sounds: No stridor.     Musculoskeletal:      Cervical back: Normal range of motion and neck supple.     Skin:     General: Skin is warm and dry.      Findings: Erythema and rash present.      Comments: Right gluteal fold there is a small area of induration about 2-3 cm erythema consistent with infected sebaceous or follicular cyst.      Neurological:      Mental Status: He is alert and oriented to person, place, and time.     Psychiatric:         Behavior: Behavior normal.         Ortho Exam        Procedures  No Procedures performed today        Note: Portions of this record may have been created with voice recognition software. Occasional wrong word or \"sound a like\" substitutions may have occurred due to the inherent limitations of voice recognition software. Please read the chart carefully and recognize, using context, where substitutions have occurred.*             [1]    Current Outpatient Medications:   •  ADVOCATE LANCETS MISC, by Does not apply route daily Patient Tests Once a Day. DX: E11.9, Disp: 100 each, Rfl: 5  •  ascorbic acid (VITAMIN C) " 1000 MG tablet, Take 1 tablet by mouth as needed On hold for surgery, Disp: , Rfl:   •  cholecalciferol (VITAMIN D3) 1,000 units tablet, Take 5,000 Units by mouth in the morning. On hold for surgery., Disp: , Rfl:   •  ibuprofen (MOTRIN) 800 mg tablet, Take 1 tablet (800 mg total) by mouth every 8 (eight) hours as needed for mild pain Take 1 tablet 3 times daily, then take only as needed. Take with food and water. Discontinue if stomach upset occurs., Disp: 30 tablet, Rfl: 0  •  metoprolol tartrate (LOPRESSOR) 50 mg tablet, TAKE 1 TABLET EVERY 12 HOURS, Disp: 180 tablet, Rfl: 3  •  multivitamin (THERAGRAN) TABS, Take 1 tablet by mouth in the morning. On hold for surgery., Disp: , Rfl:   •  sulfamethoxazole-trimethoprim (Bactrim DS) 800-160 mg per tablet, Take 1 tablet by mouth every 12 (twelve) hours for 7 days, Disp: 14 tablet, Rfl: 0  •  ciprofloxacin-dexamethasone (CIPRODEX) otic suspension, Administer 4 drops into the left ear 2 (two) times a day for 7 days, Disp: 7.5 mL, Rfl: 0  •  lisinopril (ZESTRIL) 10 mg tablet, Take 1 tablet (10 mg total) by mouth daily, Disp: 30 tablet, Rfl: 2  •  meclizine (ANTIVERT) 25 mg tablet, Take 1 tablet (25 mg total) by mouth every 8 (eight) hours as needed for dizziness (Patient not taking: Reported on 5/22/2025), Disp: 30 tablet, Rfl: 0  •  NON FORMULARY, Take by mouth in the morning Shilajit tablet, on hold for surgery (Patient not taking: Reported on 5/22/2025), Disp: , Rfl:   •  ondansetron (ZOFRAN-ODT) 4 mg disintegrating tablet, Take 1 tablet (4 mg total) by mouth every 6 (six) hours as needed for nausea or vomiting (Patient not taking: Reported on 5/22/2025), Disp: 9 tablet, Rfl: 0[2]  Past Medical History:  Diagnosis Date   • Hyperlipidemia    • Hypertension    • Obesity    [3]  Past Surgical History:  Procedure Laterality Date   • COLONOSCOPY     • INCISION AND DRAINAGE OF WOUND Right 09/07/2017    Procedure: INCISION AND DRAINAGE (I&D) BUTTOCK;  Surgeon: Naveen  MD Andrey;  Location: [facility];  Service: General   • KNEE ARTHROSCOPY Right 2020   • SC ARTHRS KNE SURG W/MENISCECTOMY MED/LAT W/SHVG Left 12/28/2022    Procedure: ARTHROSCOPY KNEE- Left Knee partial lateral and medial menisectomy;  Surgeon: Celestino Cobos DO;  Location: [facility];  Service: Orthopedics   • VASECTOMY     [4]  Family History  Problem Relation Name Age of Onset   • Diabetes Mother     • Thyroid disease Mother     • Hypertension Father

## 2025-05-23 ENCOUNTER — DOCUMENTATION (OUTPATIENT)
Dept: ADMINISTRATIVE | Facility: OTHER | Age: 50
End: 2025-05-23

## 2025-05-23 NOTE — PROGRESS NOTES
manny SALCEDO Patient Reported Team         Blood pressure elevated  Appointment department: [facility]  Appointment provider: Roe Schaeffer PA-C  Blood pressure  05/22/25 1841 164/96  05/22/25 1815 (!) 184/104  05/23/25 11:31 AM    Patient was called after the Urgent Care visit ; there was no answer/ a message could not be left.    Thank you.  Abhishek Lombardi MA  [facility]

## (undated) DEVICE — ABDOMINAL PAD: Brand: DERMACEA

## (undated) DEVICE — BLADE SHAVER DISSECTOR 3.5MM 13CM COOLCUT

## (undated) DEVICE — TUBING SUCTION 5MM X 12 FT

## (undated) DEVICE — GLOVE INDICATOR PI UNDERGLOVE SZ 7.5 BLUE

## (undated) DEVICE — ACE WRAP 6 IN UNSTERILE

## (undated) DEVICE — DRAPE EQUIPMENT RF WAND

## (undated) DEVICE — TUBING ARTHROSCOPIC WAVE  MAIN PUMP

## (undated) DEVICE — LIGHT HANDLE COVER CAMERA DISP

## (undated) DEVICE — SPONGE LAP 18 X 18 IN

## (undated) DEVICE — 4-PORT MANIFOLD: Brand: NEPTUNE 2

## (undated) DEVICE — SUT ETHILON 3-0 PS-1 18 IN 1663G

## (undated) DEVICE — 3M™ STERI-DRAPE™ U-DRAPE 1015: Brand: STERI-DRAPE™

## (undated) DEVICE — INTENDED FOR TISSUE SEPARATION, AND OTHER PROCEDURES THAT REQUIRE A SHARP SURGICAL BLADE TO PUNCTURE OR CUT.: Brand: BARD-PARKER ® CARBON RIB-BACK BLADES

## (undated) DEVICE — LIGHT GLOVE GREEN

## (undated) DEVICE — BETHLEHEM UNIVERSAL  ARTHRO PK: Brand: CARDINAL HEALTH

## (undated) DEVICE — GAUZE SPONGES,16 PLY: Brand: CURITY

## (undated) DEVICE — INTENDED FOR TISSUE SEPARATION, AND OTHER PROCEDURES THAT REQUIRE A SHARP SURGICAL BLADE TO PUNCTURE OR CUT.: Brand: BARD-PARKER SAFETY BLADES SIZE 15, STERILE

## (undated) DEVICE — POOLE SUCTION HANDLE: Brand: CARDINAL HEALTH

## (undated) DEVICE — CHLORAPREP HI-LITE 26ML ORANGE

## (undated) DEVICE — BLADE SHAVER DISSECTOR 4MM 13CM COOLCUT

## (undated) DEVICE — PLUMEPEN PRO 10FT

## (undated) DEVICE — DRAPE SHEET THREE QUARTER

## (undated) DEVICE — PAD GROUNDING ADULT

## (undated) DEVICE — PADDING CAST 4 IN  COTTON STRL

## (undated) DEVICE — STRL UNIVERSAL MINOR GENERAL: Brand: CARDINAL HEALTH

## (undated) DEVICE — 3000CC GUARDIAN II: Brand: GUARDIAN

## (undated) DEVICE — SCD SEQUENTIAL COMPRESSION COMFORT SLEEVE MEDIUM KNEE LENGTH: Brand: KENDALL SCD

## (undated) DEVICE — SUT VICRYL 0 CT-2 18 IN J727D

## (undated) DEVICE — COBAN 4 IN STERILE

## (undated) DEVICE — LIGHT HANDLE COVER SLEEVE DISP BLUE STELLAR

## (undated) DEVICE — GLOVE SRG BIOGEL ORTHOPEDIC 7.5

## (undated) DEVICE — OCCLUSIVE GAUZE STRIP,3% BISMUTH TRIBROMOPHENATE IN PETROLATUM BLEND: Brand: XEROFORM

## (undated) DEVICE — SUT MONOCRYL 4-0 PS-2 18 IN Y496G

## (undated) DEVICE — GLOVE SRG BIOGEL 7.5

## (undated) DEVICE — 3M™ STERI-STRIP™ REINFORCED ADHESIVE SKIN CLOSURES, R1547, 1/2 IN X 4 IN (12 MM X 100 MM), 6 STRIPS/ENVELOPE: Brand: 3M™ STERI-STRIP™

## (undated) DEVICE — NEEDLE 25GA X 1 IN SAFETY GLIDE

## (undated) DEVICE — IMPERVIOUS STOCKINETTE: Brand: DEROYAL

## (undated) DEVICE — STRETCH BANDAGE: Brand: CURITY

## (undated) DEVICE — CURITY PLAIN PACKING STRIP: Brand: CURITY

## (undated) DEVICE — SUT VICRYL 2-0 CT-2 18 IN J726D

## (undated) DEVICE — GLOVE SRG BIOGEL ECLIPSE 7.5